# Patient Record
Sex: MALE | Race: OTHER
[De-identification: names, ages, dates, MRNs, and addresses within clinical notes are randomized per-mention and may not be internally consistent; named-entity substitution may affect disease eponyms.]

---

## 2021-11-15 ENCOUNTER — HOSPITAL ENCOUNTER (INPATIENT)
Dept: HOSPITAL 47 - EC | Age: 81
LOS: 4 days | Discharge: HOME | DRG: 193 | End: 2021-11-19
Attending: HOSPITALIST | Admitting: HOSPITALIST
Payer: OTHER GOVERNMENT

## 2021-11-15 DIAGNOSIS — I50.22: ICD-10-CM

## 2021-11-15 DIAGNOSIS — Z82.0: ICD-10-CM

## 2021-11-15 DIAGNOSIS — Z82.49: ICD-10-CM

## 2021-11-15 DIAGNOSIS — Z87.01: ICD-10-CM

## 2021-11-15 DIAGNOSIS — Z20.822: ICD-10-CM

## 2021-11-15 DIAGNOSIS — I11.0: ICD-10-CM

## 2021-11-15 DIAGNOSIS — I27.20: ICD-10-CM

## 2021-11-15 DIAGNOSIS — E11.9: ICD-10-CM

## 2021-11-15 DIAGNOSIS — I42.9: ICD-10-CM

## 2021-11-15 DIAGNOSIS — I08.1: ICD-10-CM

## 2021-11-15 DIAGNOSIS — Z79.899: ICD-10-CM

## 2021-11-15 DIAGNOSIS — E78.5: ICD-10-CM

## 2021-11-15 DIAGNOSIS — E86.0: ICD-10-CM

## 2021-11-15 DIAGNOSIS — J96.01: ICD-10-CM

## 2021-11-15 DIAGNOSIS — I44.7: ICD-10-CM

## 2021-11-15 DIAGNOSIS — F17.200: ICD-10-CM

## 2021-11-15 DIAGNOSIS — Z79.84: ICD-10-CM

## 2021-11-15 DIAGNOSIS — I24.9: ICD-10-CM

## 2021-11-15 DIAGNOSIS — Z79.890: ICD-10-CM

## 2021-11-15 DIAGNOSIS — J18.9: Primary | ICD-10-CM

## 2021-11-15 DIAGNOSIS — E89.0: ICD-10-CM

## 2021-11-15 DIAGNOSIS — N17.9: ICD-10-CM

## 2021-11-15 DIAGNOSIS — Z79.82: ICD-10-CM

## 2021-11-15 PROCEDURE — 96361 HYDRATE IV INFUSION ADD-ON: CPT

## 2021-11-15 PROCEDURE — 93306 TTE W/DOPPLER COMPLETE: CPT

## 2021-11-15 PROCEDURE — 85027 COMPLETE CBC AUTOMATED: CPT

## 2021-11-15 PROCEDURE — 94760 N-INVAS EAR/PLS OXIMETRY 1: CPT

## 2021-11-15 PROCEDURE — 83605 ASSAY OF LACTIC ACID: CPT

## 2021-11-15 PROCEDURE — 78452 HT MUSCLE IMAGE SPECT MULT: CPT

## 2021-11-15 PROCEDURE — 83735 ASSAY OF MAGNESIUM: CPT

## 2021-11-15 PROCEDURE — 80048 BASIC METABOLIC PNL TOTAL CA: CPT

## 2021-11-15 PROCEDURE — 93005 ELECTROCARDIOGRAM TRACING: CPT

## 2021-11-15 PROCEDURE — 87635 SARS-COV-2 COVID-19 AMP PRB: CPT

## 2021-11-15 PROCEDURE — 96366 THER/PROPH/DIAG IV INF ADDON: CPT

## 2021-11-15 PROCEDURE — 71046 X-RAY EXAM CHEST 2 VIEWS: CPT

## 2021-11-15 PROCEDURE — 87040 BLOOD CULTURE FOR BACTERIA: CPT

## 2021-11-15 PROCEDURE — 96367 TX/PROPH/DG ADDL SEQ IV INF: CPT

## 2021-11-15 PROCEDURE — 36415 COLL VENOUS BLD VENIPUNCTURE: CPT

## 2021-11-15 PROCEDURE — 83880 ASSAY OF NATRIURETIC PEPTIDE: CPT

## 2021-11-15 PROCEDURE — 80053 COMPREHEN METABOLIC PANEL: CPT

## 2021-11-15 PROCEDURE — 96365 THER/PROPH/DIAG IV INF INIT: CPT

## 2021-11-15 PROCEDURE — 85730 THROMBOPLASTIN TIME PARTIAL: CPT

## 2021-11-15 PROCEDURE — 85025 COMPLETE CBC W/AUTO DIFF WBC: CPT

## 2021-11-15 PROCEDURE — 85610 PROTHROMBIN TIME: CPT

## 2021-11-15 PROCEDURE — 93017 CV STRESS TEST TRACING ONLY: CPT

## 2021-11-15 PROCEDURE — 84484 ASSAY OF TROPONIN QUANT: CPT

## 2021-11-15 PROCEDURE — 85379 FIBRIN DEGRADATION QUANT: CPT

## 2021-11-15 PROCEDURE — 99285 EMERGENCY DEPT VISIT HI MDM: CPT

## 2021-11-16 LAB
ALBUMIN SERPL-MCNC: 3.5 G/DL (ref 3.5–5)
ALP SERPL-CCNC: 120 U/L (ref 38–126)
ALT SERPL-CCNC: 18 U/L (ref 4–49)
ANION GAP SERPL CALC-SCNC: 10 MMOL/L
APTT BLD: 23.8 SEC (ref 22–30)
AST SERPL-CCNC: 32 U/L (ref 17–59)
BASOPHILS # BLD AUTO: 0.1 K/UL (ref 0–0.2)
BASOPHILS NFR BLD AUTO: 1 %
BUN SERPL-SCNC: 37 MG/DL (ref 9–20)
CALCIUM SPEC-MCNC: 8.1 MG/DL (ref 8.4–10.2)
CHLORIDE SERPL-SCNC: 103 MMOL/L (ref 98–107)
CO2 SERPL-SCNC: 22 MMOL/L (ref 22–30)
EOSINOPHIL # BLD AUTO: 0.2 K/UL (ref 0–0.7)
EOSINOPHIL NFR BLD AUTO: 2 %
ERYTHROCYTE [DISTWIDTH] IN BLOOD BY AUTOMATED COUNT: 3.84 M/UL (ref 4.3–5.9)
ERYTHROCYTE [DISTWIDTH] IN BLOOD: 12.8 % (ref 11.5–15.5)
GLUCOSE BLD-MCNC: 112 MG/DL (ref 75–99)
GLUCOSE BLD-MCNC: 159 MG/DL (ref 75–99)
GLUCOSE BLD-MCNC: 194 MG/DL (ref 75–99)
GLUCOSE SERPL-MCNC: 182 MG/DL (ref 74–99)
HCT VFR BLD AUTO: 35.5 % (ref 39–53)
HGB BLD-MCNC: 11.6 GM/DL (ref 13–17.5)
INR PPP: 1.1 (ref ?–1.2)
LYMPHOCYTES # SPEC AUTO: 1.7 K/UL (ref 1–4.8)
LYMPHOCYTES NFR SPEC AUTO: 16 %
MAGNESIUM SPEC-SCNC: 2.3 MG/DL (ref 1.6–2.3)
MCH RBC QN AUTO: 30.1 PG (ref 25–35)
MCHC RBC AUTO-ENTMCNC: 32.6 G/DL (ref 31–37)
MCV RBC AUTO: 92.5 FL (ref 80–100)
MONOCYTES # BLD AUTO: 1 K/UL (ref 0–1)
MONOCYTES NFR BLD AUTO: 10 %
NEUTROPHILS # BLD AUTO: 7.6 K/UL (ref 1.3–7.7)
NEUTROPHILS NFR BLD AUTO: 71 %
PLATELET # BLD AUTO: 550 K/UL (ref 150–450)
POTASSIUM SERPL-SCNC: 4.9 MMOL/L (ref 3.5–5.1)
PROT SERPL-MCNC: 7.1 G/DL (ref 6.3–8.2)
PT BLD: 11.1 SEC (ref 9–12)
SODIUM SERPL-SCNC: 135 MMOL/L (ref 137–145)
WBC # BLD AUTO: 10.7 K/UL (ref 3.8–10.6)

## 2021-11-16 RX ADMIN — AZITHROMYCIN SCH MG: 500 TABLET, FILM COATED ORAL at 12:40

## 2021-11-16 RX ADMIN — INSULIN ASPART SCH: 100 INJECTION, SOLUTION INTRAVENOUS; SUBCUTANEOUS at 12:41

## 2021-11-16 RX ADMIN — METOPROLOL TARTRATE SCH MG: 25 TABLET, FILM COATED ORAL at 12:40

## 2021-11-16 RX ADMIN — HEPARIN SODIUM SCH UNIT: 5000 INJECTION INTRAVENOUS; SUBCUTANEOUS at 22:41

## 2021-11-16 RX ADMIN — INSULIN ASPART SCH UNIT: 100 INJECTION, SOLUTION INTRAVENOUS; SUBCUTANEOUS at 17:56

## 2021-11-16 RX ADMIN — METOPROLOL TARTRATE SCH MG: 25 TABLET, FILM COATED ORAL at 22:41

## 2021-11-16 RX ADMIN — ASPIRIN 81 MG CHEWABLE TABLET SCH MG: 81 TABLET CHEWABLE at 12:40

## 2021-11-16 RX ADMIN — HEPARIN SODIUM SCH UNIT: 5000 INJECTION INTRAVENOUS; SUBCUTANEOUS at 12:41

## 2021-11-16 NOTE — ED
General Adult HPI





- General


Chief complaint: Upper Respiratory Infection


Stated complaint: SOB


Time Seen by Provider: 11/16/21 00:22


Source: patient, RN notes reviewed


Mode of arrival: ambulatory


Limitations: no limitations





- History of Present Illness


Initial comments: 





Patient is an 81-year-old male with history of hypertension, hyperlipidemia, 

thyroid disorder, presenting to the emergency department with feeling short of 

breath over the past 3-4 days.  He states he had very mild cough but nothing out

of the ordinary.  He states he feeling really winded doing his normal 

activities.  He states he normally walks a lot and has not been able to do that.

 He denies any chest pain over the past couple days.  Denies any fevers or 

chills, no abdominal pain, nausea or vomiting.  He states he feels like he has 

still been eating his normal amount.  He has had no changes in the medications. 

He denies history of heart disease, no blood clots in the past, no recent 

travel.  He is not on blood thinners.  Patient has no further complaints.  Upon 

arrival to the ER, he was 89% on room air, rest of vitals normal.





- Related Data


                                Home Medications











 Medication  Instructions  Recorded  Confirmed


 


Aspirin EC [Ecotrin Low Dose] 81 mg PO Q48H 11/16/21 11/16/21


 


Carboxymethylcellulose Sodium 1 drop BOTH EYES QID PRN 11/16/21 11/16/21





[Refresh Tears]   


 


Levothyroxine Sodium [Synthroid] 125 mcg PO DAILY 11/16/21 11/16/21


 


Sildenafil Citrate [Viagra] 100 mg PO DAILY PRN 11/16/21 11/16/21


 


Simvastatin [Zocor] 40 mg PO HS 11/16/21 11/16/21


 


lisinopriL [Prinivil] 20 mg PO DAILY 11/16/21 11/16/21


 


metFORMIN  mg PO BID 11/16/21 11/16/21








                                  Previous Rx's











 Medication  Instructions  Recorded


 


Acetaminophen Tab [Tylenol] 650 mg PO Q6HR PRN  tab 11/19/21


 


Azithromycin [Zithromax] 500 mg PO DAILY 3 Days #3 tab 11/19/21


 


Cefdinir [Omnicef] 300 mg PO BID 5 Days #10 cap 11/19/21


 


Furosemide [Lasix] 40 mg PO DAILY 30 Days #30 tab 11/19/21


 


Metoprolol Tartrate [Lopressor] 25 mg PO BID 30 Days #60 tab 11/19/21











                                    Allergies











Allergy/AdvReac Type Severity Reaction Status Date / Time


 


No Known Allergies Allergy   Verified 11/16/21 00:02














Review of Systems


ROS Statement: 


Those systems with pertinent positive or pertinent negative responses have been 

documented in the HPI.





ROS Other: All systems not noted in ROS Statement are negative.





Past Medical History


Past Medical History: Hyperlipidemia, Hypertension, Thyroid Disorder


History of Any Multi-Drug Resistant Organisms: None Reported


Additional Past Surgical History / Comment(s): Thyroidectomy


Past Psychological History: No Psychological Hx Reported


Smoking Status: Current every day smoker


Past Alcohol Use History: None Reported


Past Drug Use History: None Reported





- Past Family History


  ** Mother


Family Medical History: Dementia


Additional Family Medical History / Comment(s): alzheimers





  ** Father


Family Medical History: Congestive Heart Failure (CHF)





General Exam





- General Exam Comments


Initial Comments: 





GENERAL: 


Patient is well-developed and well-nourished.  Patient is nontoxic and in no 

acute distress.





HEAD: 


Atraumatic, normocephalic.





EYES:


Pupils equal round and reactive to light, extraocular movements intact, sclera 

anicteric, conjunctiva are normal.  Eyelids were unremarkable.





ENT: 


Nares patent, oropharynx clear without exudates.  Moist mucous membranes.





NECK: 


Normal range of motion, supple without lymphadenopathy or JVD.





LUNGS:


Unlabored respirations.  Breath sounds clear to auscultation bilaterally and 

equal.  No wheezes rales or rhonchi.





HEART:


Regular rate and rhythm without murmurs, rubs or gallops.





ABDOMEN: 


Soft, nontender, normoactive bowel sounds.  No guarding, no rebound.  No masses 

appreciated.





MUSCULOSKELETAL: 


Normal extremities with adequate strength and normal range of motion, no pitting

 or edema.  No clubbing or cyanosis.





NEUROLOGICAL: 


Patient is alert and oriented x 3.  Motor and sensory are also intact.  Cranial 

nerves II through XII grossly intact.  Symmetrical smile.  Normal speech, normal

 gait.   





PSYCH:


Normal mood, normal affect.





SKIN:


 Warm, Dry, normal turgor, no rashes or lesions noted.


Limitations: no limitations





Course


                                   Vital Signs











  11/15/21 11/16/21 11/16/21





  23:58 01:27 01:45


 


Temperature 97.4 F L  


 


Pulse Rate 66  75


 


Pulse Rate [   





Right Pulse   





Oximetery]   


 


Respiratory 18 20 20





Rate   


 


Blood Pressure 134/58  135/89


 


Blood Pressure   





[Right Arm   





Supine]   


 


O2 Sat by Pulse 89 L  93 L





Oximetry   














  11/16/21 11/16/21 11/16/21





  02:00 03:00 04:00


 


Temperature   


 


Pulse Rate 77 72 80


 


Pulse Rate [   





Right Pulse   





Oximetery]   


 


Respiratory 18 20 18





Rate   


 


Blood Pressure 150/64 139/73 147/76


 


Blood Pressure   





[Right Arm   





Supine]   


 


O2 Sat by Pulse 93 L 95 94 L





Oximetry   














  11/16/21 11/16/21 11/16/21





  06:38 08:00 11:57


 


Temperature 99.0 F 97.5 F L 97.8 F


 


Pulse Rate 70  


 


Pulse Rate [  79 79





Right Pulse   





Oximetery]   


 


Respiratory 18 22 22





Rate   


 


Blood Pressure 146/77  


 


Blood Pressure  148/83 151/89





[Right Arm   





Supine]   


 


O2 Sat by Pulse 92 L 92 L 90 L





Oximetry   














  11/16/21 11/16/21 11/16/21





  14:00 16:00 19:48


 


Temperature  99.3 F 98.1 F


 


Pulse Rate   67


 


Pulse Rate [ 79 65 





Right Pulse   





Oximetery]   


 


Respiratory 22 20 16





Rate   


 


Blood Pressure   130/60


 


Blood Pressure  132/65 





[Right Arm   





Supine]   


 


O2 Sat by Pulse  95 93 L





Oximetry   














  11/16/21 11/17/21 11/17/21





  22:39 01:50 03:29


 


Temperature  98.5 F 


 


Pulse Rate 69  


 


Pulse Rate [  65 





Right Pulse   





Oximetery]   


 


Respiratory 18 18 





Rate   


 


Blood Pressure   


 


Blood Pressure  136/70 





[Right Arm   





Supine]   


 


O2 Sat by Pulse 94 L 96 94 L





Oximetry   














  11/17/21





  06:00


 


Temperature 


 


Pulse Rate 


 


Pulse Rate [ 





Right Pulse 





Oximetery] 


 


Respiratory 





Rate 


 


Blood Pressure 


 


Blood Pressure 





[Right Arm 





Supine] 


 


O2 Sat by Pulse 91 L





Oximetry 














- Reevaluation(s)


Reevaluation #1: 





11/16/21 03:33


Patient will be started on fluids at a rate of 100 mL per hour, secondary to 

heart failure for possible sepsis, dehydration.





EKG Findings





- EKG Comments:


EKG Findings:: Sinus rhythm with occasional premature ventricular complexes, 

left axis deviation, left bundle branch block, no signs of acute ST segment 

elevation.  There are no previous to compare this to.  Ventricular rate 76, NE 

interval 164, .





Medical Decision Making





- Medical Decision Making





Patient is an 81-year-old male with history of hypertension, hyperlipidemia, 

thyroid disease, presenting with shortness of breath over the past 3-4 days.  He

said a mild cough.  Patient arrived 89% on room air, rest of vitals within 

normal limits.  He has been on 3 L at 94%, resting comfortably.  He's had no 

chest pain over the past 3 days.  No acute ST segment changes on his EKG, left 

bundle adrián block.  Labs show a white count of 10.7, d-dimer is mildly bumped 

at 0.91.  Mild kidney failure with creatinine at 1.34, BUN is 37.  Lactic acid 

is 2.1, troponin is 0.069 with a BNP almost 6000.  Rapid Covid is negative.  Ch

est x-ray shows diffuse right sided pneumonia.  No heart failure. Patient has no

chest pain here today.  Patient will be admitted for pneumonia, and consult to 

cardiology, started on antibiotics.  Case discussed with Dr. Styles. 





- Lab Data


Result diagrams: 


                                 11/18/21 11:16





                                 11/18/21 11:16


                                   Lab Results











  11/16/21 11/16/21 11/16/21 Range/Units





  01:41 01:41 01:41 


 


WBC  10.7 H    (3.8-10.6)  k/uL


 


RBC  3.84 L    (4.30-5.90)  m/uL


 


Hgb  11.6 L    (13.0-17.5)  gm/dL


 


Hct  35.5 L    (39.0-53.0)  %


 


MCV  92.5    (80.0-100.0)  fL


 


MCH  30.1    (25.0-35.0)  pg


 


MCHC  32.6    (31.0-37.0)  g/dL


 


RDW  12.8    (11.5-15.5)  %


 


Plt Count  550 H    (150-450)  k/uL


 


MPV  7.7    


 


Neutrophils %  71    %


 


Lymphocytes %  16    %


 


Monocytes %  10    %


 


Eosinophils %  2    %


 


Basophils %  1    %


 


Neutrophils #  7.6    (1.3-7.7)  k/uL


 


Lymphocytes #  1.7    (1.0-4.8)  k/uL


 


Monocytes #  1.0    (0-1.0)  k/uL


 


Eosinophils #  0.2    (0-0.7)  k/uL


 


Basophils #  0.1    (0-0.2)  k/uL


 


PT   11.1   (9.0-12.0)  sec


 


INR   1.1   (<1.2)  


 


APTT   23.8   (22.0-30.0)  sec


 


D-Dimer   0.91 H   (<0.60)  mg/L FEU


 


Sodium    135 L  (137-145)  mmol/L


 


Potassium    4.9  (3.5-5.1)  mmol/L


 


Chloride    103  ()  mmol/L


 


Carbon Dioxide    22  (22-30)  mmol/L


 


Anion Gap    10  mmol/L


 


BUN    37 H  (9-20)  mg/dL


 


Creatinine    1.34 H  (0.66-1.25)  mg/dL


 


Est GFR (CKD-EPI)AfAm    57  (>60 ml/min/1.73 sqM)  


 


Est GFR (CKD-EPI)NonAf    50  (>60 ml/min/1.73 sqM)  


 


Glucose    182 H  (74-99)  mg/dL


 


Lactic Ac Sepsis Rflx     


 


Plasma Lactic Acid Girma     (0.7-2.0)  mmol/L


 


Calcium    8.1 L  (8.4-10.2)  mg/dL


 


Magnesium    2.3  (1.6-2.3)  mg/dL


 


Total Bilirubin    0.4  (0.2-1.3)  mg/dL


 


AST    32  (17-59)  U/L


 


ALT    18  (4-49)  U/L


 


Alkaline Phosphatase    120  ()  U/L


 


Troponin I     (0.000-0.034)  ng/mL


 


NT-Pro-B Natriuret Pep     pg/mL


 


Total Protein    7.1  (6.3-8.2)  g/dL


 


Albumin    3.5  (3.5-5.0)  g/dL


 


Coronavirus (PCR)     (Not Detectd)  














  11/16/21 11/16/21 11/16/21 Range/Units





  01:41 01:41 01:41 


 


WBC     (3.8-10.6)  k/uL


 


RBC     (4.30-5.90)  m/uL


 


Hgb     (13.0-17.5)  gm/dL


 


Hct     (39.0-53.0)  %


 


MCV     (80.0-100.0)  fL


 


MCH     (25.0-35.0)  pg


 


MCHC     (31.0-37.0)  g/dL


 


RDW     (11.5-15.5)  %


 


Plt Count     (150-450)  k/uL


 


MPV     


 


Neutrophils %     %


 


Lymphocytes %     %


 


Monocytes %     %


 


Eosinophils %     %


 


Basophils %     %


 


Neutrophils #     (1.3-7.7)  k/uL


 


Lymphocytes #     (1.0-4.8)  k/uL


 


Monocytes #     (0-1.0)  k/uL


 


Eosinophils #     (0-0.7)  k/uL


 


Basophils #     (0-0.2)  k/uL


 


PT     (9.0-12.0)  sec


 


INR     (<1.2)  


 


APTT     (22.0-30.0)  sec


 


D-Dimer     (<0.60)  mg/L FEU


 


Sodium     (137-145)  mmol/L


 


Potassium     (3.5-5.1)  mmol/L


 


Chloride     ()  mmol/L


 


Carbon Dioxide     (22-30)  mmol/L


 


Anion Gap     mmol/L


 


BUN     (9-20)  mg/dL


 


Creatinine     (0.66-1.25)  mg/dL


 


Est GFR (CKD-EPI)AfAm     (>60 ml/min/1.73 sqM)  


 


Est GFR (CKD-EPI)NonAf     (>60 ml/min/1.73 sqM)  


 


Glucose     (74-99)  mg/dL


 


Lactic Ac Sepsis Rflx     


 


Plasma Lactic Acid Girma  2.1 H*    (0.7-2.0)  mmol/L


 


Calcium     (8.4-10.2)  mg/dL


 


Magnesium     (1.6-2.3)  mg/dL


 


Total Bilirubin     (0.2-1.3)  mg/dL


 


AST     (17-59)  U/L


 


ALT     (4-49)  U/L


 


Alkaline Phosphatase     ()  U/L


 


Troponin I   0.069 H*   (0.000-0.034)  ng/mL


 


NT-Pro-B Natriuret Pep    5980  pg/mL


 


Total Protein     (6.3-8.2)  g/dL


 


Albumin     (3.5-5.0)  g/dL


 


Coronavirus (PCR)     (Not Detectd)  














  11/16/21 11/16/21 Range/Units





  01:41 02:50 


 


WBC    (3.8-10.6)  k/uL


 


RBC    (4.30-5.90)  m/uL


 


Hgb    (13.0-17.5)  gm/dL


 


Hct    (39.0-53.0)  %


 


MCV    (80.0-100.0)  fL


 


MCH    (25.0-35.0)  pg


 


MCHC    (31.0-37.0)  g/dL


 


RDW    (11.5-15.5)  %


 


Plt Count    (150-450)  k/uL


 


MPV    


 


Neutrophils %    %


 


Lymphocytes %    %


 


Monocytes %    %


 


Eosinophils %    %


 


Basophils %    %


 


Neutrophils #    (1.3-7.7)  k/uL


 


Lymphocytes #    (1.0-4.8)  k/uL


 


Monocytes #    (0-1.0)  k/uL


 


Eosinophils #    (0-0.7)  k/uL


 


Basophils #    (0-0.2)  k/uL


 


PT    (9.0-12.0)  sec


 


INR    (<1.2)  


 


APTT    (22.0-30.0)  sec


 


D-Dimer    (<0.60)  mg/L FEU


 


Sodium    (137-145)  mmol/L


 


Potassium    (3.5-5.1)  mmol/L


 


Chloride    ()  mmol/L


 


Carbon Dioxide    (22-30)  mmol/L


 


Anion Gap    mmol/L


 


BUN    (9-20)  mg/dL


 


Creatinine    (0.66-1.25)  mg/dL


 


Est GFR (CKD-EPI)AfAm    (>60 ml/min/1.73 sqM)  


 


Est GFR (CKD-EPI)NonAf    (>60 ml/min/1.73 sqM)  


 


Glucose    (74-99)  mg/dL


 


Lactic Ac Sepsis Rflx   Y  


 


Plasma Lactic Acid Girma    (0.7-2.0)  mmol/L


 


Calcium    (8.4-10.2)  mg/dL


 


Magnesium    (1.6-2.3)  mg/dL


 


Total Bilirubin    (0.2-1.3)  mg/dL


 


AST    (17-59)  U/L


 


ALT    (4-49)  U/L


 


Alkaline Phosphatase    ()  U/L


 


Troponin I    (0.000-0.034)  ng/mL


 


NT-Pro-B Natriuret Pep    pg/mL


 


Total Protein    (6.3-8.2)  g/dL


 


Albumin    (3.5-5.0)  g/dL


 


Coronavirus (PCR)  Not Detected   (Not Detectd)  














Disposition


Clinical Impression: 


 Pneumonia, Dyspnea, Elevated troponin, Dehydration





Disposition: ADMITTED AS IP TO THIS [facility]


Condition: Stable


Decision Date: 11/16/21


Decision Time: 03:28

## 2021-11-16 NOTE — P.CRDCN
History of Present Illness


History of present illness: 


HISTORY OF PRESENTING ILLNESS


This is a pleasant 81-year-old male past medical history significant for 

hypertension, dyslipidemia, hypothyroidism, type 2 diabetes.  He does not follow

with a cardiologist. We have been asked to see in consultation for elevated 

troponin. Patient is seen and examined in the emergency department. Patient 

presents to the emergency department with complaints of shortness of breath and 

non-productive cough. His symptoms started 11/10 and progressively were getting 

worse and decided presents emergency department for further evaluation.. He 

denies any chest pain, nausea, diaphoresis, palpitations, lightheadedness, 

dizziness, symptoms of presyncope or syncope.  Denies symptoms of orthopnea or 

PND. He denies fevers or chills.  He denies any history of MI, stroke, coronary 

artery disease.  He denies a family history of heart disease. He denies smoking,

states he quit smoking many years ago. 





DIAGNOSTICS


EKG reveals sinus rhythm with PVCs, left bundle branch block, heart rate 76, 

Left axis deviation. No prior EKG to compare


Echocardiogram revealed an EF 4045 percent, RV is mildly enlarged, mild to 

moderate mitral regurgitation, moderate severe tricuspid regurgitation, moderate

to severe pulmonary hypertension with RVSP of 57 mmHg


Telemetry tracings indicate sinus mechanism heart rates 70s80s.


Chest xray revealed diffuse right-sided pneumonia.


Laboratory reviewed, WBC 10.7, hemoglobin 11.6, platelets 550, d-dimer 0.91, 

sodium 135, potassium 4., BUN 37, serum creatinine 1.3, lactate 2.1, repeat 1.4,

troponin 0.06, 0.7, 0.05, proBNP 5008, COVID-19 PCR negative.


Current cardiac medications include simvastatin 40 mg nightly, lisinopril 20 mg 

daily, aspirin 81 mg daily, amlodipine 10 mg daily





REVIEW OF SYSTEMS


At the time of my exam:


CONSTITUTIONAL: Denies fever or chills.


CARDIOVASCULAR: Positive shortness of breath, positive cough Denies chest pain, 

orthopnea, PND or palpitations.


RESPIRATORY: + cough


GASTROINTESTINAL: Denies abdominal pain, diarrhea, constipation, nausea or 

vomiting.


MUSCULOSKELETAL: Denies myalgias.


NEUROLOGIC: Denies numbness, tingling, headacbe or weakness.


ENDOCRINE: Denies fatigue, weight change,  polydipsia or polyurina.


GENITOURINARY: Denies burning, hematuria or urgency with micturation.


HEMATOLOGIC: Denies history of anemia or bleeding. 





PHYSICAL EXAMINATION


Blood pressure 151/89, heart 69, afebrile saturations 90% on 4 L nasal cannula


CONSTITUTIONAL: No apparent distress. 


HEENT: Head is normocephalic. Pupils are equal, round. Sclerae anicteric. Mucous

membranes of the mouth are moist.  No JVD. No carotid bruit.


CHEST EXAMINATION: Lungs are clear to auscultation. No chest wall tenderness is 

noted on palpation or with deep breathing. 


HEART EXAMINATION: Regular rate and rhythm. S1, S2 heard.Systolic ejection 

murmur at apex. 


ABDOMEN: Soft, nontender. Positive bowel sounds.


EXTREMITIES: 2+ peripheral pulses, no lower extremity edema and no calf 

tenderness.


NEUROLOGIC EXAMINATION: Patient is awake, alert and oriented x3. 





ASSESSMENT


Acute hypoxic respiratory failure probably secondary to pneumonia


Elevated troponin, mild, likely due to supply an demand mismatch, no consistent 

with acute coronary syndrome


Acute Kidney Injury


Severe tricuspid regurgitation 


History of hypertension


Dyslipidemia


Type 2 diabetes


Echocardiogram revealed cardiomyopathy with EF 40-45%, unclear if ischemic vs 

non-ischemic at this time, patient appears euvolemic on exam 


History of hypothyroidism 





PLAN


2D Echocardiogram obtained and reviewed.


Start aspirin 81 mg daily, continue statin.


Continue lisinopril 20 mg daily


Start metoprolol tartrate 12.5 mg twice a day


Further recommendations based on clinical course 





Nurse Practitioner note has been reviewed, I agree with a documented findings 

and plan of care.  Patient was seen and examined.














Past Medical History


Past Medical History: Hyperlipidemia, Hypertension, Thyroid Disorder


History of Any Multi-Drug Resistant Organisms: None Reported


Additional Past Surgical History / Comment(s): Thyroidectomy


Past Psychological History: No Psychological Hx Reported


Smoking Status: Current every day smoker


Past Alcohol Use History: None Reported


Past Drug Use History: None Reported





Medications and Allergies


                                Home Medications











 Medication  Instructions  Recorded  Confirmed  Type


 


Aspirin EC [Ecotrin Low Dose] 81 mg PO Q48H 11/16/21 11/16/21 History


 


Carboxymethylcellulose Sodium 1 drop BOTH EYES QID PRN 11/16/21 11/16/21 History





[Refresh Tears]    


 


Levothyroxine Sodium [Synthroid] 125 mcg PO DAILY 11/16/21 11/16/21 History


 


Sildenafil Citrate [Viagra] 100 mg PO DAILY PRN 11/16/21 11/16/21 History


 


Simvastatin [Zocor] 40 mg PO HS 11/16/21 11/16/21 History


 


amLODIPine [Norvasc] 10 mg PO DAILY 11/16/21 11/16/21 History


 


lisinopriL [Prinivil] 20 mg PO DAILY 11/16/21 11/16/21 History


 


metFORMIN  mg PO BID 11/16/21 11/16/21 History








                                    Allergies











Allergy/AdvReac Type Severity Reaction Status Date / Time


 


No Known Allergies Allergy   Verified 11/16/21 00:02














Physical Exam


Vitals: 


                                   Vital Signs











  Temp Pulse Resp BP Pulse Ox


 


 11/16/21 06:38  99.0 F  70  18  146/77  92 L


 


 11/16/21 04:00   80  18  147/76  94 L


 


 11/16/21 03:00   72  20  139/73  95


 


 11/16/21 02:00   77  18  150/64  93 L


 


 11/16/21 01:45   75  20  135/89  93 L


 


 11/16/21 01:27    20  


 


 11/15/21 23:58  97.4 F L  66  18  134/58  89 L








                                Intake and Output











 11/15/21 11/16/21 11/16/21





 22:59 06:59 14:59


 


Other:   


 


  Weight  97.522 kg 














Results





                                 11/16/21 01:41





                                 11/16/21 01:41


                                 Cardiac Enzymes











  11/16/21 11/16/21 11/16/21 Range/Units





  01:41 01:41 05:34 


 


AST  32    (17-59)  U/L


 


Troponin I   0.069 H*  0.075 H*  (0.000-0.034)  ng/mL








                                   Coagulation











  11/16/21 Range/Units





  01:41 


 


PT  11.1  (9.0-12.0)  sec


 


APTT  23.8  (22.0-30.0)  sec








                                       CBC











  11/16/21 Range/Units





  01:41 


 


WBC  10.7 H  (3.8-10.6)  k/uL


 


RBC  3.84 L  (4.30-5.90)  m/uL


 


Hgb  11.6 L  (13.0-17.5)  gm/dL


 


Hct  35.5 L  (39.0-53.0)  %


 


Plt Count  550 H  (150-450)  k/uL








                          Comprehensive Metabolic Panel











  11/16/21 Range/Units





  01:41 


 


Sodium  135 L  (137-145)  mmol/L


 


Potassium  4.9  (3.5-5.1)  mmol/L


 


Chloride  103  ()  mmol/L


 


Carbon Dioxide  22  (22-30)  mmol/L


 


BUN  37 H  (9-20)  mg/dL


 


Creatinine  1.34 H  (0.66-1.25)  mg/dL


 


Glucose  182 H  (74-99)  mg/dL


 


Calcium  8.1 L  (8.4-10.2)  mg/dL


 


AST  32  (17-59)  U/L


 


ALT  18  (4-49)  U/L


 


Alkaline Phosphatase  120  ()  U/L


 


Total Protein  7.1  (6.3-8.2)  g/dL


 


Albumin  3.5  (3.5-5.0)  g/dL








                               Current Medications











Generic Name Dose Route Start Last Admin





  Trade Name Freq  PRN Reason Stop Dose Admin


 


Acetaminophen  650 mg  11/16/21 03:31 





  Acetaminophen Tab 325 Mg Tab  PO  





  Q6HR PRN  





  Mild Pain or Fever > 100.5  


 


Sodium Chloride  1,000 mls @ 100 mls/hr  11/16/21 03:30  11/16/21 03:55





  Saline 0.9%  IV   100 mls/hr





  .Q10H OPAL   Administration


 


Ibuprofen  400 mg  11/16/21 03:31 





  Ibuprofen 400 Mg Tab  PO  





  Q6HR PRN  





  Mild Pain or Fever > 100.5  


 


Miscellaneous Information  1 each  11/16/21 03:29 





  Pneumonia Protocol Utilized 1 Each Misc  PO  





  ONCE PRN  





  Per Protocol  


 


Naloxone HCl  0.2 mg  11/16/21 03:31 





  Naloxone 0.4 Mg/Ml 1 Ml Vial  IV  





  Q2M PRN  





  Opioid Reversal  


 


Ondansetron HCl  4 mg  11/16/21 03:31 





  Ondansetron 4 Mg/2 Ml Vial  IVP  





  Q8HR PRN  





  Nausea And Vomiting  








                                Intake and Output











 11/15/21 11/16/21 11/16/21





 22:59 06:59 14:59


 


Other:   


 


  Weight  97.522 kg 








                                        





                                 11/16/21 01:41 





                                 11/16/21 01:41

## 2021-11-16 NOTE — XR
EXAMINATION TYPE: XR chest 2V

 

DATE OF EXAM: 11/16/2021

 

COMPARISON: NONE

 

HISTORY: Difficulty breathing

 

TECHNIQUE: 2 views

 

FINDINGS: There is diffuse airspace infiltrate in the right lung. Left lung is relatively clear. Hear
t size is normal. There is no heart failure. Bony thorax is intact.

 

IMPRESSION: Diffuse right-sided pneumonia. Normal heart.

## 2021-11-16 NOTE — ECHOF
Referral Reason:LV function



MEASUREMENTS

--------

HEIGHT: 177.8 cm

WEIGHT: 97.5 kg

BP: 148/83

RVIDd:   4.0 cm     (< 3.3)

IVSd:   1.4 cm     (0.6 - 1.1)

LVIDd:   4.8 cm     (3.9 - 5.3)

LVPWd:   1.3 cm     (0.6 - 1.1)

IVSs:   2.1 cm

LVIDs:   3.5 cm

LVPWs:   1.9 cm

LAESV Index (A-L):   27.15 ml/m

Ao Diam:   3.0 cm     (2.0 - 3.7)

AV Cusp:   2.0 cm     (1.5 - 2.6)

MV EXCURSION:   14.991 mm     (> 18.000)

MV EF SLOPE:   60 mm/s     (70 - 150)

EPSS:   1.1 cm

MV E Alex:   0.88 m/s

MV DecT:   329 ms

MV A Alex:   1.28 m/s

MV E/A Ratio:   0.69 

RAP:   5.00 mmHg

RVSP:   56.92 mmHg







FINDINGS

--------

Sinus rhythm with extra systolic beats.

This was a technically difficult study with suboptimal apical views.

The left ventricular size is normal.   There is moderate concentric left ventricular hypertrophy.   O
verall left ventricular systolic function is mild-moderately impaired with, an EF between 40 - 45 %.

The right ventricle is moderately enlarged.

Normal LA  size by volume 22+/-6 ml/m2.

The right atrium was not well visualized.

5.0mg of Lumason was utilized for enhancement of images

Interatrial and interventricular septum intact.

There is no evidence of aortic regurgitation.   There is no evidence of aortic stenosis.

Mild-to-moderate mitral regurgitation is present.

Moderate to severe tricuspid regurgitation present.   There is moderate to severe pulmonary hypertens
ion.   The right ventricular systolic pressure, as measured by Doppler, is 56.92mmHg.

There is no pulmonic regurgitation present.

The aortic root size is normal.

IVC Not well visulized.

There is no pericardial effusion.



CONCLUSIONS

--------

1. The left ventricular size is normal.

2. There is moderate concentric left ventricular hypertrophy.

3. Overall left ventricular systolic function is mild-moderately impaired with, an EF between 40 - 45
 %.

4. The right ventricle is moderately enlarged.

5. Mild-to-moderate mitral regurgitation is present.

6. Moderate to severe tricuspid regurgitation present.

7. There is moderate to severe pulmonary hypertension.

8. The right ventricular systolic pressure, as measured by Doppler, is 56.92mmHg.





SONOGRAPHER: Shayna Pearson RDCS

## 2021-11-16 NOTE — P.HPIM
History of Present Illness


Patient is is a pleasant 81-year-old male came in with complaints of shortness 

of breath cough with sputum production found to have right upper lobe pneumonia.

 Patient does have leukocytosis and low-grade fever as well.  Patient had a 

remote history of smoking quit in 1967.  Patient does have a elevated BNP denied

any significant the proximal nocturnal dyspnea questionable orthopnea.  Patient 

did get an echo cardiac exam which showed mildly decreased EF of around 40-45% 

and patient doesn't have a known history of can start failure patient does have 

severe tricuspid regurgitation and the moderate to severe pulmonary hypertens

ion.  Patient has mildly elevated troponins without any significant chest pain. 

Troponins are flat.  Cardiology will evaluate the patient.











REVIEW OF SYSTEMS: 


CONSTITUTIONAL:  no malaise, no fatigue. 


HEENT: No recent visual problems or hearing problems. Denied any sore throat. 


CARDIOVASCULAR: No chest pain, orthopnea, PND, no palpitations, no syncope. 


PULMONARY: As mentioned in HPI 


GASTROINTESTINAL: No diarrhea, no nausea, no vomiting, no abdominal pain. 


NEUROLOGICAL: No headaches, no weakness, no numbness. 


HEMATOLOGICAL: Denies any bleeding or petechiae. 


GENITOURINARY: Denies any burning micturition, frequency, or urgency. 


MUSCULOSKELETAL/RHEUMATOLOGICAL: Denies any joint pain, swelling, or any muscle 

pain. 


ENDOCRINE: Denies any polyuria or polydipsia. 





The rest of the 14-point review of systems is negative.











PHYSICAL EXAMINATION: 





GENERAL: The patient is alert and oriented x3, not in any acute distress.  Obese

HEENT: Pupils are round and equally reacting to light. EOMI. No scleral icterus.

No conjunctival pallor. Normocephalic, atraumatic. No pharyngeal erythema. No 

thyromegaly. 


CARDIOVASCULAR: S1 and S2 present. No murmurs, rubs, or gallops. 


PULMONARY: Chest is clear to auscultation, no wheezing or crackles. 


ABDOMEN: Soft, nontender, nondistended, normoactive bowel sounds. No palpable 

organomegaly. 


MUSCULOSKELETAL: No joint swelling or deformity.


EXTREMITIES: No cyanosis, clubbing, or pedal edema. 


NEUROLOGICAL: Gross neurological examination did not reveal any focal deficits. 


SKIN: No rashes. 





Assessment and plan


-Acute hypoxic respiratory failure probably secondary to pneumonia patient may 

have a competent of sleep apnea and does have pulmonary hypertension of as well 

because of which are patient is requiring oxygen around 4 L at this time.  He 

continued on Rocephin and azithromycin.  Sputum cultures will be obtained.


-Mild troponin elevation secondary to hypoxemia and pneumonia cardiology will 

evaluate the patient.  Patient has left bundle branch block on EKG previous EKGs

are not available to compare


-moderate to severe pulmonary hypertension probably secondary to mitral 

regurgitation and/or sleep apnea.


--Elevated creatinine unsure whether patient has acute renal failure chronic 

kidney disease possibility of acute failure patient received IV fluids with 

because of the concerns of her decreased ejection fraction and elevated BNP I'm 

stopping his IV fluids at this time although patient clinically and presently 

not in heart failure exacerbation and will be continued on lisinopril if his 

creatinine continued get was then we'll let this can you.  We'll at that time.  

We'll recheck patient's creatinine tomorrow


-Congestive heart failure chronic systolic dysfunction without any acute 

exacerbation 


-Severe tricuspid regurgitation


-Possibly of pulmonary hypertension


-Hypothyroidism


Type 2 diabetes mellitus: Hold off on metformin patient will be started on 

sliding scale insulin


DVT prophylaxis: Subcutaneous heparin








Past Medical History


Past Medical History: Hyperlipidemia, Hypertension, Thyroid Disorder


History of Any Multi-Drug Resistant Organisms: None Reported


Additional Past Surgical History / Comment(s): Thyroidectomy


Past Psychological History: No Psychological Hx Reported


Smoking Status: Current every day smoker


Past Alcohol Use History: None Reported


Past Drug Use History: None Reported





Medications and Allergies


                                Home Medications











 Medication  Instructions  Recorded  Confirmed  Type


 


Aspirin EC [Ecotrin Low Dose] 81 mg PO Q48H 11/16/21 11/16/21 History


 


Carboxymethylcellulose Sodium 1 drop BOTH EYES QID PRN 11/16/21 11/16/21 History





[Refresh Tears]    


 


Levothyroxine Sodium [Synthroid] 125 mcg PO DAILY 11/16/21 11/16/21 History


 


Sildenafil Citrate [Viagra] 100 mg PO DAILY PRN 11/16/21 11/16/21 History


 


Simvastatin [Zocor] 40 mg PO HS 11/16/21 11/16/21 History


 


amLODIPine [Norvasc] 10 mg PO DAILY 11/16/21 11/16/21 History


 


lisinopriL [Prinivil] 20 mg PO DAILY 11/16/21 11/16/21 History


 


metFORMIN  mg PO BID 11/16/21 11/16/21 History








                                    Allergies











Allergy/AdvReac Type Severity Reaction Status Date / Time


 


No Known Allergies Allergy   Verified 11/16/21 00:02














Physical Exam


Vitals: 


                                   Vital Signs











  Temp Pulse Pulse Resp BP BP Pulse Ox


 


 11/16/21 08:00  97.5 F L   78  19   148/83  92 L


 


 11/16/21 06:38  99.0 F  70   18  146/77   92 L


 


 11/16/21 04:00   80   18  147/76   94 L


 


 11/16/21 03:00   72   20  139/73   95


 


 11/16/21 02:00   77   18  150/64   93 L


 


 11/16/21 01:45   75   20  135/89   93 L


 


 11/16/21 01:27     20   


 


 11/15/21 23:58  97.4 F L  66   18  134/58   89 L








                                Intake and Output











 11/15/21 11/16/21 11/16/21





 22:59 06:59 14:59


 


Other:   


 


  Weight  97.522 kg 














Results


CBC & Chem 7: 


                                 11/16/21 01:41





                                 11/16/21 01:41


Labs: 


                  Abnormal Lab Results - Last 24 Hours (Table)











  11/16/21 11/16/21 11/16/21 Range/Units





  01:41 01:41 01:41 


 


WBC  10.7 H    (3.8-10.6)  k/uL


 


RBC  3.84 L    (4.30-5.90)  m/uL


 


Hgb  11.6 L    (13.0-17.5)  gm/dL


 


Hct  35.5 L    (39.0-53.0)  %


 


Plt Count  550 H    (150-450)  k/uL


 


D-Dimer   0.91 H   (<0.60)  mg/L FEU


 


Sodium    135 L  (137-145)  mmol/L


 


BUN    37 H  (9-20)  mg/dL


 


Creatinine    1.34 H  (0.66-1.25)  mg/dL


 


Glucose    182 H  (74-99)  mg/dL


 


Plasma Lactic Acid Girma     (0.7-2.0)  mmol/L


 


Calcium    8.1 L  (8.4-10.2)  mg/dL


 


Troponin I     (0.000-0.034)  ng/mL














  11/16/21 11/16/21 11/16/21 Range/Units





  01:41 01:41 05:34 


 


WBC     (3.8-10.6)  k/uL


 


RBC     (4.30-5.90)  m/uL


 


Hgb     (13.0-17.5)  gm/dL


 


Hct     (39.0-53.0)  %


 


Plt Count     (150-450)  k/uL


 


D-Dimer     (<0.60)  mg/L FEU


 


Sodium     (137-145)  mmol/L


 


BUN     (9-20)  mg/dL


 


Creatinine     (0.66-1.25)  mg/dL


 


Glucose     (74-99)  mg/dL


 


Plasma Lactic Acid Girma  2.1 H*    (0.7-2.0)  mmol/L


 


Calcium     (8.4-10.2)  mg/dL


 


Troponin I   0.069 H*  0.075 H*  (0.000-0.034)  ng/mL














  11/16/21 Range/Units





  08:25 


 


WBC   (3.8-10.6)  k/uL


 


RBC   (4.30-5.90)  m/uL


 


Hgb   (13.0-17.5)  gm/dL


 


Hct   (39.0-53.0)  %


 


Plt Count   (150-450)  k/uL


 


D-Dimer   (<0.60)  mg/L FEU


 


Sodium   (137-145)  mmol/L


 


BUN   (9-20)  mg/dL


 


Creatinine   (0.66-1.25)  mg/dL


 


Glucose   (74-99)  mg/dL


 


Plasma Lactic Acid Girma   (0.7-2.0)  mmol/L


 


Calcium   (8.4-10.2)  mg/dL


 


Troponin I  0.056 H*  (0.000-0.034)  ng/mL

## 2021-11-17 LAB
ANION GAP SERPL CALC-SCNC: 14.4 MMOL/L (ref 4–12)
BUN SERPL-SCNC: 33 MG/DL (ref 9–27)
BUN/CREAT SERPL: 23.74 RATIO (ref 12–20)
CALCIUM SPEC-MCNC: 7.5 MG/DL (ref 8.7–10.3)
CHLORIDE SERPL-SCNC: 104 MMOL/L (ref 96–109)
CO2 SERPL-SCNC: 20.1 MMOL/L (ref 21.6–31.8)
ERYTHROCYTE [DISTWIDTH] IN BLOOD BY AUTOMATED COUNT: 3.44 X 10*6/UL (ref 4.4–5.6)
ERYTHROCYTE [DISTWIDTH] IN BLOOD: 13.2 % (ref 11.5–14.5)
GLUCOSE BLD-MCNC: 127 MG/DL (ref 75–99)
GLUCOSE BLD-MCNC: 174 MG/DL (ref 75–99)
GLUCOSE BLD-MCNC: 210 MG/DL (ref 75–99)
GLUCOSE SERPL-MCNC: 128 MG/DL (ref 70–110)
HCT VFR BLD AUTO: 32.2 % (ref 39.6–50)
HGB BLD-MCNC: 10.4 G/DL (ref 13–17)
MCH RBC QN AUTO: 30.2 PG (ref 27–32)
MCHC RBC AUTO-ENTMCNC: 32.3 G/DL (ref 32–37)
MCV RBC AUTO: 93.6 FL (ref 80–97)
PLATELET # BLD AUTO: 530 X 10*3/UL (ref 140–440)
POTASSIUM SERPL-SCNC: 4.4 MMOL/L (ref 3.5–5.5)
SODIUM SERPL-SCNC: 138 MMOL/L (ref 135–145)
WBC # BLD AUTO: 10.62 X 10*3/UL (ref 4.5–10)

## 2021-11-17 RX ADMIN — ATORVASTATIN CALCIUM SCH MG: 20 TABLET, FILM COATED ORAL at 20:14

## 2021-11-17 RX ADMIN — INSULIN ASPART SCH: 100 INJECTION, SOLUTION INTRAVENOUS; SUBCUTANEOUS at 01:40

## 2021-11-17 RX ADMIN — ASPIRIN 81 MG CHEWABLE TABLET SCH MG: 81 TABLET CHEWABLE at 09:05

## 2021-11-17 RX ADMIN — METOPROLOL TARTRATE SCH MG: 25 TABLET, FILM COATED ORAL at 20:14

## 2021-11-17 RX ADMIN — LEVOTHYROXINE SODIUM SCH MCG: 0.12 TABLET ORAL at 09:05

## 2021-11-17 RX ADMIN — METOPROLOL TARTRATE SCH MG: 25 TABLET, FILM COATED ORAL at 10:18

## 2021-11-17 RX ADMIN — HEPARIN SODIUM SCH UNIT: 5000 INJECTION INTRAVENOUS; SUBCUTANEOUS at 09:04

## 2021-11-17 RX ADMIN — ATORVASTATIN CALCIUM SCH: 20 TABLET, FILM COATED ORAL at 03:42

## 2021-11-17 RX ADMIN — INSULIN ASPART SCH UNIT: 100 INJECTION, SOLUTION INTRAVENOUS; SUBCUTANEOUS at 20:14

## 2021-11-17 RX ADMIN — HEPARIN SODIUM SCH UNIT: 5000 INJECTION INTRAVENOUS; SUBCUTANEOUS at 20:14

## 2021-11-17 RX ADMIN — FUROSEMIDE SCH MG: 10 INJECTION, SOLUTION INTRAMUSCULAR; INTRAVENOUS at 10:18

## 2021-11-17 RX ADMIN — AZITHROMYCIN SCH MG: 500 TABLET, FILM COATED ORAL at 09:04

## 2021-11-17 RX ADMIN — INSULIN ASPART SCH: 100 INJECTION, SOLUTION INTRAVENOUS; SUBCUTANEOUS at 18:21

## 2021-11-17 RX ADMIN — INSULIN ASPART SCH UNIT: 100 INJECTION, SOLUTION INTRAVENOUS; SUBCUTANEOUS at 12:12

## 2021-11-17 RX ADMIN — INSULIN ASPART SCH: 100 INJECTION, SOLUTION INTRAVENOUS; SUBCUTANEOUS at 09:05

## 2021-11-17 NOTE — XR
EXAMINATION TYPE: XR chest 2V

 

DATE OF EXAM: 11/17/2021

 

COMPARISON: Chest x-ray 11/16/2021

 

HISTORY: Pneumonia

 

TECHNIQUE:  Frontal and lateral views of the chest are obtained.

 

FINDINGS:  Airspace disease seen within the right lung shows a similar appearance. No significant int
erval change. Region is rotated. Cardiac mediastinal silhouette is stable. No evident pneumothorax or
 pleural effusion.

 

IMPRESSION:  Findings consistent with patient's history of pneumonia.

## 2021-11-17 NOTE — P.PN
Subjective


Progress Note Date: 11/17/21


Patient is is a pleasant 81-year-old male came in with complaints of shortness 

of breath cough with sputum production found to have right upper lobe pneumonia.

 Patient does have leukocytosis and low-grade fever as well.  Patient had a 

remote history of smoking quit in 1967.  Patient does have a elevated BNP denied

any significant the proximal nocturnal dyspnea questionable orthopnea.  Patient 

did get an echo cardiac exam which showed mildly decreased EF of around 40-45% 

and patient doesn't have a known history of can start failure patient does have 

severe tricuspid regurgitation and the moderate to severe pulmonary 

hypertension.  Patient has mildly elevated troponins without any significant 

chest pain.  Troponins are flat.  Cardiology will evaluate the patient.





11/17/2020


Patient is evaluated today resting the bed.  He is dyspneic however he states 

that he was just getting back from the bathroom.  There is no wheezing noted.  

Plan is for patient to undergo a stress test tomorrow.  He is 91% on 4 L nasal 

cannula.  Does not wear any oxygen at home.  Blood pressure today is 110/66, 

heart rate 69, temp 97.5.  Labs today show a white count of 10.62, hemoglobin 

10.4.  CO2 is 20, BUN 33, creatinine 1.4, glucoses in the 170s.  Patient 

continues on IV Rocephin, IV Lasix.  Chest x-ray completed this afternoon shows 

findings consistent with history of pneumonia.





ROS





Constitutional: Denied any fatigue denied any fever.


Cardio vascular: denied any chest pain, palpitations


Gastrointestinal denied any nausea vomiting


Pulmonary: Denies cough, reports shortness of breath with exertion


Neurologic denied any new focal deficits





All inpatient medications were reviewed and appropriate changes in these 

medications as dictated in the interval history and assessment and plan.





PHYSICAL EXAMINATION: 





GENERAL: The patient is alert and oriented x3, not in any acute distress.  Obese

HEENT: Pupils are round and equally reacting to light. EOMI. No scleral icterus.

No conjunctival pallor. Normocephalic, atraumatic. No pharyngeal erythema. No 

thyromegaly. 


CARDIOVASCULAR: S1 and S2 present. No murmurs, rubs, or gallops. 


PULMONARY: Chest is clear to auscultation, no wheezing or crackles. 


ABDOMEN: Soft, nontender, nondistended, normoactive bowel sounds. No palpable 

organomegaly. 


MUSCULOSKELETAL: No joint swelling or deformity.


EXTREMITIES: No cyanosis, clubbing, or pedal edema. 


NEUROLOGICAL: Gross neurological examination did not reveal any focal deficits. 


SKIN: No rashes. 





Assessment and plan


-Acute hypoxic respiratory failure probably secondary to pneumonia, chest x-ray 

demonstrates pneumonia, white count trending down


-Mild troponin elevation secondary to hypoxemia from pneumonia, not suggestive 

of ACS, stress test planned for tomorrow


-moderate to severe pulmonary hypertension probably secondary to mitral 

regurgitation and/or sleep apnea.


-Acute kidney injury, unsure whether there is a chronic disease component, 

creatinine today is 1.4, IV fluids stopped due to EF


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


-Severe tricuspid regurgitation


-Hypothyroidism


Type 2 diabetes mellitus: Hold off on metformin patient will be started on 

sliding scale insulin


DVT prophylaxis: Subcutaneous heparin





Plan


Repeat labs tomorrow


IV Lasix daily


Continue with IV and PO antibiotics


Continue nasal cannula, wean as tolerated


Stress test planned for tomorrow


Continue all other supportive care








Objective





- Vital Signs


Vital signs: 


                                   Vital Signs











Temp  97.5 F L  11/17/21 07:31


 


Pulse  69   11/17/21 07:31


 


Resp  18   11/17/21 07:31


 


BP  129/66   11/17/21 07:31


 


Pulse Ox  91 L  11/17/21 08:14








                                 Intake & Output











 11/16/21 11/17/21 11/17/21





 18:59 06:59 18:59


 


Output Total 200  


 


Balance -200  


 


Weight  97.522 kg 


 


Output:   


 


  Urine 200  


 


Other:   


 


  Voiding Method Toilet Toilet 


 


  # Voids 3  


 


  # Bowel Movements 2  














- Labs


CBC & Chem 7: 


                                 11/17/21 03:24





                                 11/17/21 03:24


Labs: 


                  Abnormal Lab Results - Last 24 Hours (Table)











  11/16/21 11/16/21 11/16/21 Range/Units





  08:25 11:42 16:45 


 


POC Glucose (mg/dL)   159 H  194 H  (75-99)  mg/dL


 


Troponin I  0.056 H*    (0.000-0.034)  ng/mL














  11/16/21 11/17/21 Range/Units





  20:41 08:02 


 


POC Glucose (mg/dL)  112 H  127 H  (75-99)  mg/dL


 


Troponin I    (0.000-0.034)  ng/mL








                      Microbiology - Last 24 Hours (Table)











 11/16/21 03:45 Blood Culture - Preliminary





 Blood    No Growth after 24 hours


 


 11/16/21 03:30 Blood Culture - Preliminary





 Blood    No Growth after 24 hours














Assessment and Plan


Time with Patient: Greater than 30

## 2021-11-17 NOTE — P.PN
Subjective


Progress Note Date: 11/17/21





HISTORY OF PRESENTING ILLNESS


This is a pleasant 81-year-old male past medical history significant for hyp

ertension, dyslipidemia, hypothyroidism, type 2 diabetes.  He does not follow 

with a cardiologist. We have been asked to see in consultation for elevated 

troponin. Patient is seen and examined in the emergency department. Patient 

presents to the emergency department with complaints of shortness of breath and 

non-productive cough. His symptoms started 11/10 and progressively were getting 

worse and decided presents emergency department for further evaluation.. He 

denies any chest pain, nausea, diaphoresis, palpitations, lightheadedness, 

dizziness, symptoms of presyncope or syncope.  Denies symptoms of orthopnea or 

PND. He denies fevers or chills.  He denies any history of MI, stroke, coronary 

artery disease.  He denies a family history of heart disease. He denies smoking,

states he quit smoking many years ago. 





DIAGNOSTICS


EKG reveals sinus rhythm with PVCs, left bundle branch block, heart rate 76, 

Left axis deviation. No prior EKG to compare


Echocardiogram revealed an EF 4045 percent, RV is mildly enlarged, mild to 

moderate mitral regurgitation, moderate severe tricuspid regurgitation, moderate

to severe pulmonary hypertension with RVSP of 57 mmHg


Telemetry tracings indicate sinus mechanism heart rates 70s80s.


Chest xray revealed diffuse right-sided pneumonia.


Laboratory reviewed, WBC 10.7, hemoglobin 11.6, platelets 550, d-dimer 0.91, 

sodium 135, potassium 4., BUN 37, serum creatinine 1.3, lactate 2.1, repeat 1.4,

troponin 0.06, 0.7, 0.05, proBNP 5008, COVID-19 PCR negative.


Current cardiac medications include simvastatin 40 mg nightly, lisinopril 20 mg 

daily, aspirin 81 mg daily, amlodipine 10 mg daily





11/17/2021


Patient examined this morning with Dr. Donaldson. The patient states he is 

feeling much better today. He reports improvement in his shortness of breath. He

remains on nasal cannula. He does report shortness of breath with exertion. He 

reports a nonproductive cough.  He denies chest pain or pressure.





PHYSICAL EXAMINATION


CONSTITUTIONAL: No apparent distress. 


HEENT: Head is normocephalic. Pupils are equal, round. Sclerae anicteric. Mucous

membranes of the mouth are moist.  No JVD. No carotid bruit.


CHEST EXAMINATION: Lungs are clear to auscultation. No chest wall tenderness is 

noted on palpation or with deep breathing. 


HEART EXAMINATION: Regular rate and rhythm. S1, S2 heard. Systolic ejection 

murmur at apex. 


ABDOMEN: Soft, nontender. Positive bowel sounds.


EXTREMITIES: 2+ peripheral pulses, no lower extremity edema and no calf 

tenderness.


NEUROLOGIC EXAMINATION: Patient is awake, alert and oriented x3. 





ASSESSMENT


Acute hypoxic respiratory failure probably secondary to pneumonia


Elevated troponin, mild, likely due to supply an demand mismatch, no consistent 

with acute coronary syndrome


Acute Kidney Injury


Severe tricuspid regurgitation 


History of hypertension


Dyslipidemia


Type 2 diabetes


Echocardiogram revealed cardiomyopathy with EF 40-45%, unclear if ischemic vs 

non-ischemic at this time


History of hypothyroidism 





PLAN


Continue current cardiac medications


Begin IV lasix 40mg daily


Accurate I&O


Daily weights


Patient to undergo Lexiscan stress test tomorrow secondary to cardiomyopathy (EF

40-45%) and abnormal troponins


Further recommendations pending patient course





Nurse Practitioner note has been reviewed, I agree with a documented findings 

and plan of care.  Patient was seen and examined.








Objective





- Vital Signs


Vital signs: 


                                   Vital Signs











Temp  97.5 F L  11/17/21 07:31


 


Pulse  69   11/17/21 08:00


 


Resp  18   11/17/21 08:00


 


BP  129/66   11/17/21 07:31


 


Pulse Ox  91 L  11/17/21 08:14








                                 Intake & Output











 11/16/21 11/17/21 11/17/21





 18:59 06:59 18:59


 


Output Total 200  


 


Balance -200  


 


Weight  97.522 kg 


 


Output:   


 


  Urine 200  


 


Other:   


 


  Voiding Method Toilet Toilet Toilet


 


  # Voids 3  


 


  # Bowel Movements 2  














- Labs


CBC & Chem 7: 


                                 11/17/21 03:24





                                 11/17/21 03:24


Labs: 


                  Abnormal Lab Results - Last 24 Hours (Table)











  11/16/21 11/16/21 11/16/21 Range/Units





  11:42 16:45 20:41 


 


WBC     (4.50-10.00)  X 10*3/uL


 


RBC     (4.40-5.60)  X 10*6/uL


 


Hgb     (13.0-17.0)  g/dL


 


Hct     (39.6-50.0)  %


 


Plt Count     (140-440)  X 10*3/uL


 


Carbon Dioxide     (21.6-31.8)  mmol/L


 


Anion Gap     (4.00-12.00)  mmol/L


 


BUN     (9.0-27.0)  mg/dL


 


Est GFR (CKD-EPI)AfAm     (60.0-200.0)   


 


Est GFR (CKD-EPI)NonAf     (60.0-200.0)   


 


BUN/Creatinine Ratio     (12.00-20.00)  Ratio


 


Glucose     ()  mg/dL


 


POC Glucose (mg/dL)  159 H  194 H  112 H  (75-99)  mg/dL


 


Calcium     (8.7-10.3)  mg/dL














  11/17/21 11/17/21 11/17/21 Range/Units





  03:24 03:24 08:02 


 


WBC  10.62 H    (4.50-10.00)  X 10*3/uL


 


RBC  3.44 L    (4.40-5.60)  X 10*6/uL


 


Hgb  10.4 L    (13.0-17.0)  g/dL


 


Hct  32.2 L    (39.6-50.0)  %


 


Plt Count  530 H    (140-440)  X 10*3/uL


 


Carbon Dioxide   20.1 L   (21.6-31.8)  mmol/L


 


Anion Gap   14.40 H   (4.00-12.00)  mmol/L


 


BUN   33.0 H   (9.0-27.0)  mg/dL


 


Est GFR (CKD-EPI)AfAm   54.7 L   (60.0-200.0)   


 


Est GFR (CKD-EPI)NonAf   47.2 L   (60.0-200.0)   


 


BUN/Creatinine Ratio   23.74 H   (12.00-20.00)  Ratio


 


Glucose   128 H   ()  mg/dL


 


POC Glucose (mg/dL)    127 H  (75-99)  mg/dL


 


Calcium   7.5 L   (8.7-10.3)  mg/dL








                      Microbiology - Last 24 Hours (Table)











 11/16/21 03:45 Blood Culture - Preliminary





 Blood    No Growth after 24 hours


 


 11/16/21 03:30 Blood Culture - Preliminary





 Blood    No Growth after 24 hours

## 2021-11-18 LAB
ANION GAP SERPL CALC-SCNC: 6 MMOL/L
BASOPHILS # BLD AUTO: 0.1 K/UL (ref 0–0.2)
BASOPHILS NFR BLD AUTO: 1 %
BUN SERPL-SCNC: 28 MG/DL (ref 9–20)
CALCIUM SPEC-MCNC: 7.8 MG/DL (ref 8.4–10.2)
CHLORIDE SERPL-SCNC: 104 MMOL/L (ref 98–107)
CO2 SERPL-SCNC: 27 MMOL/L (ref 22–30)
EOSINOPHIL # BLD AUTO: 0.5 K/UL (ref 0–0.7)
EOSINOPHIL NFR BLD AUTO: 6 %
ERYTHROCYTE [DISTWIDTH] IN BLOOD BY AUTOMATED COUNT: 3.88 M/UL (ref 4.3–5.9)
ERYTHROCYTE [DISTWIDTH] IN BLOOD: 12.8 % (ref 11.5–15.5)
GLUCOSE BLD-MCNC: 128 MG/DL (ref 75–99)
GLUCOSE BLD-MCNC: 133 MG/DL (ref 75–99)
GLUCOSE BLD-MCNC: 145 MG/DL (ref 75–99)
GLUCOSE BLD-MCNC: 148 MG/DL (ref 75–99)
GLUCOSE SERPL-MCNC: 161 MG/DL (ref 74–99)
HCT VFR BLD AUTO: 36.9 % (ref 39–53)
HGB BLD-MCNC: 11.8 GM/DL (ref 13–17.5)
LYMPHOCYTES # SPEC AUTO: 1.8 K/UL (ref 1–4.8)
LYMPHOCYTES NFR SPEC AUTO: 20 %
MCH RBC QN AUTO: 30.3 PG (ref 25–35)
MCHC RBC AUTO-ENTMCNC: 31.9 G/DL (ref 31–37)
MCV RBC AUTO: 95 FL (ref 80–100)
MONOCYTES # BLD AUTO: 0.9 K/UL (ref 0–1)
MONOCYTES NFR BLD AUTO: 10 %
NEUTROPHILS # BLD AUTO: 5.6 K/UL (ref 1.3–7.7)
NEUTROPHILS NFR BLD AUTO: 62 %
PLATELET # BLD AUTO: 587 K/UL (ref 150–450)
POTASSIUM SERPL-SCNC: 4.5 MMOL/L (ref 3.5–5.1)
SODIUM SERPL-SCNC: 137 MMOL/L (ref 137–145)
WBC # BLD AUTO: 9.1 K/UL (ref 3.8–10.6)

## 2021-11-18 RX ADMIN — METOPROLOL TARTRATE SCH MG: 25 TABLET, FILM COATED ORAL at 22:10

## 2021-11-18 RX ADMIN — INSULIN ASPART SCH UNIT: 100 INJECTION, SOLUTION INTRAVENOUS; SUBCUTANEOUS at 17:43

## 2021-11-18 RX ADMIN — ASPIRIN 81 MG CHEWABLE TABLET SCH MG: 81 TABLET CHEWABLE at 11:47

## 2021-11-18 RX ADMIN — AZITHROMYCIN SCH MG: 500 TABLET, FILM COATED ORAL at 11:48

## 2021-11-18 RX ADMIN — HEPARIN SODIUM SCH UNIT: 5000 INJECTION INTRAVENOUS; SUBCUTANEOUS at 22:10

## 2021-11-18 RX ADMIN — INSULIN ASPART SCH: 100 INJECTION, SOLUTION INTRAVENOUS; SUBCUTANEOUS at 06:11

## 2021-11-18 RX ADMIN — METOPROLOL TARTRATE SCH MG: 25 TABLET, FILM COATED ORAL at 11:48

## 2021-11-18 RX ADMIN — INSULIN ASPART SCH UNIT: 100 INJECTION, SOLUTION INTRAVENOUS; SUBCUTANEOUS at 11:46

## 2021-11-18 RX ADMIN — FUROSEMIDE SCH: 10 INJECTION, SOLUTION INTRAMUSCULAR; INTRAVENOUS at 12:04

## 2021-11-18 RX ADMIN — LEVOTHYROXINE SODIUM SCH MCG: 0.12 TABLET ORAL at 06:12

## 2021-11-18 RX ADMIN — HEPARIN SODIUM SCH UNIT: 5000 INJECTION INTRAVENOUS; SUBCUTANEOUS at 11:47

## 2021-11-18 RX ADMIN — LEVOTHYROXINE SODIUM SCH MCG: 0.12 TABLET ORAL at 11:47

## 2021-11-18 RX ADMIN — INSULIN ASPART SCH UNIT: 100 INJECTION, SOLUTION INTRAVENOUS; SUBCUTANEOUS at 22:10

## 2021-11-18 RX ADMIN — ATORVASTATIN CALCIUM SCH MG: 20 TABLET, FILM COATED ORAL at 22:10

## 2021-11-18 NOTE — P.PN
Subjective


Progress Note Date: 11/18/21


Patient is is a pleasant 81-year-old male came in with complaints of shortness 

of breath cough with sputum production found to have right upper lobe pneumonia.

 Patient does have leukocytosis and low-grade fever as well.  Patient had a 

remote history of smoking quit in 1967.  Patient does have a elevated BNP denied

any significant the proximal nocturnal dyspnea questionable orthopnea.  Patient 

did get an echo cardiac exam which showed mildly decreased EF of around 40-45% 

and patient doesn't have a known history of can start failure patient does have 

severe tricuspid regurgitation and the moderate to severe pulmonary 

hypertension.  Patient has mildly elevated troponins without any significant 

chest pain.  Troponins are flat.  Cardiology will evaluate the patient.





11/17/2020


Patient is evaluated today resting the bed.  He is dyspneic however he states 

that he was just getting back from the bathroom.  There is no wheezing noted.  

Plan is for patient to undergo a stress test tomorrow.  He is 91% on 4 L nasal 

cannula.  Does not wear any oxygen at home.  Blood pressure today is 110/66, 

heart rate 69, temp 97.5.  Labs today show a white count of 10.62, hemoglobin 

10.4.  CO2 is 20, BUN 33, creatinine 1.4, glucoses in the 170s.  Patient 

continues on IV Rocephin, IV Lasix.  Chest x-ray completed this afternoon shows 

findings consistent with history of pneumonia.





11/18/2021


Patient evaluated today status post Lexiscan stress test.  Vital signs today 

show a temp of 98.9, heart rate 69 sinus rhythm, blood pressure 163/68 and he is

91% 3 L nasal cannula.  Breathing is improved as compared to yesterday, there is

no wheezing noted.  Labs today show a hemoglobin of 11.8, sodium 137, potassium 

4.5, BUN 28, creatinine 1.27.  Sugars are running in the 160s.  Nuclear imaging 

stress test results cannot exclude a small stress-induced reversible ischemia 

involving the apical lateral myocardium.  There was nonspecific EKG portion 

secondary baseline EKG abnormalities with a left bundle branch block.  

Echocardiogram showed an EF of 40-45% with mild-to-moderate mitral regurgitation

and moderate to severe tricuspid regurgitation, moderate to severe pulmonary 

hypertension.  Further recommendations from cardiology.








ROS





Constitutional: Denied any fatigue denied any fever.


Cardio vascular: denied any chest pain, palpitations


Gastrointestinal denied any nausea vomiting


Pulmonary: Denies cough, reports shortness of breath with exertion


Neurologic denied any new focal deficits





All inpatient medications were reviewed and appropriate changes in these 

medications as dictated in the interval history and assessment and plan.





PHYSICAL EXAMINATION: 





GENERAL: The patient is alert and oriented x3, not in any acute distress.  Obese

HEENT: Pupils are round and equally reacting to light. EOMI. No scleral icterus.

No conjunctival pallor. Normocephalic, atraumatic. No pharyngeal erythema. No 

thyromegaly. 


CARDIOVASCULAR: S1 and S2 present. No murmurs, rubs, or gallops. 


PULMONARY: Chest is clear to auscultation, no wheezing or crackles. 


ABDOMEN: Soft, nontender, nondistended, normoactive bowel sounds. No palpable 

organomegaly. 


MUSCULOSKELETAL: No joint swelling or deformity.


EXTREMITIES: No cyanosis, clubbing, or pedal edema. 


NEUROLOGICAL: Gross neurological examination did not reveal any focal deficits. 


SKIN: No rashes. 





Assessment and plan


-Acute hypoxic respiratory failure probably secondary to pneumonia, chest x-ray 

demonstrates pneumonia, white count trending down


-Mild troponin elevation secondary to hypoxemia from pneumonia, unable to rule 

out ACS, possible stress-induced reversible ischemia found on stress test


-moderate to severe pulmonary hypertension probably secondary to mitral 

regurgitation and/or sleep apnea.


-Acute kidney injury, unsure whether there is a chronic disease component, 

creatinine today is 1.27


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


-Severe tricuspid regurgitation


-Hypothyroidism


Type 2 diabetes mellitus: Hold off on metformin patient will be started on 

sliding scale insulin


DVT prophylaxis: Subcutaneous heparin





Plan


Repeat labs tomorrow


PO lasix


Continue with IV and PO antibiotics


Continue nasal cannula, wean as tolerated


Further recommendations from cardiology regarding stress test results


Continue all other supportive care





Discharge when cleared by cardiology. 





Objective





- Vital Signs


Vital signs: 


                                   Vital Signs











Temp  99.3 F   11/18/21 04:00


 


Pulse  59 L  11/18/21 04:00


 


Resp  18   11/18/21 04:00


 


BP  146/56   11/18/21 04:00


 


Pulse Ox  92 L  11/18/21 04:00








                                 Intake & Output











 11/17/21 11/18/21 11/18/21





 18:59 06:59 18:59


 


Intake Total  10 


 


Balance  10 


 


Weight  101.9 kg 


 


Intake:   


 


  IV  10 


 


    Invasive Line 2  10 


 


Other:   


 


  Voiding Method Toilet Toilet 


 


  # Voids   1














- Labs


CBC & Chem 7: 


                                 11/18/21 11:16





                                 11/18/21 11:16


Labs: 


                  Abnormal Lab Results - Last 24 Hours (Table)











  11/17/21 11/17/21 11/17/21 Range/Units





  03:24 03:24 11:31 


 


WBC  10.62 H    (4.50-10.00)  X 10*3/uL


 


RBC  3.44 L    (4.40-5.60)  X 10*6/uL


 


Hgb  10.4 L    (13.0-17.0)  g/dL


 


Hct  32.2 L    (39.6-50.0)  %


 


Plt Count  530 H    (140-440)  X 10*3/uL


 


Carbon Dioxide   20.1 L   (21.6-31.8)  mmol/L


 


Anion Gap   14.40 H   (4.00-12.00)  mmol/L


 


BUN   33.0 H   (9.0-27.0)  mg/dL


 


Est GFR (CKD-EPI)AfAm   54.7 L   (60.0-200.0)   


 


Est GFR (CKD-EPI)NonAf   47.2 L   (60.0-200.0)   


 


BUN/Creatinine Ratio   23.74 H   (12.00-20.00)  Ratio


 


Glucose   128 H   ()  mg/dL


 


POC Glucose (mg/dL)    174 H  (75-99)  mg/dL


 


Calcium   7.5 L   (8.7-10.3)  mg/dL














  11/17/21 11/18/21 Range/Units





  20:02 05:49 


 


WBC    (4.50-10.00)  X 10*3/uL


 


RBC    (4.40-5.60)  X 10*6/uL


 


Hgb    (13.0-17.0)  g/dL


 


Hct    (39.6-50.0)  %


 


Plt Count    (140-440)  X 10*3/uL


 


Carbon Dioxide    (21.6-31.8)  mmol/L


 


Anion Gap    (4.00-12.00)  mmol/L


 


BUN    (9.0-27.0)  mg/dL


 


Est GFR (CKD-EPI)AfAm    (60.0-200.0)   


 


Est GFR (CKD-EPI)NonAf    (60.0-200.0)   


 


BUN/Creatinine Ratio    (12.00-20.00)  Ratio


 


Glucose    ()  mg/dL


 


POC Glucose (mg/dL)  210 H  128 H  (75-99)  mg/dL


 


Calcium    (8.7-10.3)  mg/dL








                      Microbiology - Last 24 Hours (Table)











 11/16/21 03:30 Blood Culture - Preliminary





 Blood    No Growth after 48 hours


 


 11/16/21 03:45 Blood Culture - Preliminary





 Blood    No Growth after 48 hours














Assessment and Plan


Time with Patient: Greater than 30

## 2021-11-18 NOTE — NM
EXAMINATION TYPE: NM stress lexiscan cardiolite

 

DATE OF EXAM: 11/18/2021

 

COMPARISON: NONE

 

HISTORY: Cardiomyopathy

 

TECHNIQUE:  After the intravenous administration of 10.4 mCi Tc 99m Sestamibi - Cardiolite resting SP
ECT images acquired 65 minutes post injection. 

 

The patient received 0.4mg Lexiscan, 27.1 mCi Tc 99m Sestamibi - Stress images obtained 60 minutes po
st injection 

 

FINDINGS:  

 

Review of stress and rest SPECT images demonstrates question of a small reversible perfusion defect i
nvolving the apical lateral portion of the myocardium.  Gated analysis shows normal wall motion with 
an estimated left ventricular ejection fraction of 49 %.

 

 

 

IMPRESSION:

1. Cannot exclude a small stress-induced reversible ischemia involving the apical lateral myocardium.
 Correlate clinically.

## 2021-11-18 NOTE — P.PN
Subjective


HISTORY OF PRESENTING ILLNESS


This is a pleasant 81-year-old male past medical history significant for 

hypertension, dyslipidemia, hypothyroidism, type 2 diabetes.  He does not follow

with a cardiologist. We have been asked to see in consultation for elevated 

troponin. Patient is seen and examined in the emergency department. Patient 

presents to the emergency department with complaints of shortness of breath and 

non-productive cough. His symptoms started 11/10 and progressively were getting 

worse and decided presents emergency department for further evaluation.. He 

denies any chest pain, nausea, diaphoresis, palpitations, lightheadedness, di

zziness, symptoms of presyncope or syncope.  Denies symptoms of orthopnea or 

PND. He denies fevers or chills.  He denies any history of MI, stroke, coronary 

artery disease.  He denies a family history of heart disease. He denies smoking,

states he quit smoking many years ago. 





DIAGNOSTICS


EKG reveals sinus rhythm with PVCs, left bundle branch block, heart rate 76, 

Left axis deviation. No prior EKG to compare


Echocardiogram revealed an EF 4045% RV is mildly enlarged, mild to moderate 

mitral regurgitation, moderate severe tricuspid regurgitation, moderate to 

severe pulmonary hypertension with RVSP of 57 mmHg





11/18/21


Patient seen at bedside after stress test. His Lexiscan revealed cannot exclude 

small stress-induced reversible ischemia involving the apical lateral 

mycoardium.  He reports improvement in his shortness of breath. He is on 3L 

Nasal cannula SpO2 90%.  He does report shortness of breath with exertion. He 

reports a nonproductive cough.  He denies chest pain or pressure.





PHYSICAL EXAMINATION


CONSTITUTIONAL: No apparent distress. 


HEENT: Neck Supple No JVD. 


CHEST EXAMINATION: Lungs are clear to auscultation. No chest wall tenderness is 

noted on palpation or with deep breathing. 


HEART EXAMINATION: Regular rate and rhythm. S1, S2 heard. Systolic ejection 

murmur at apex. 


ABDOMEN: Soft, nontender. Positive bowel sounds.


EXTREMITIES: 2+ peripheral pulses, no lower extremity edema and no calf 

tenderness.


NEUROLOGIC EXAMINATION: Patient is awake, alert and oriented x3. 





ASSESSMENT


Acute hypoxic respiratory failure probably secondary to pneumonia


Elevated troponin, mild, likely due to supply an demand mismatch


Acute Kidney Injury


Severe tricuspid regurgitation 


History of hypertension


Dyslipidemia


Type 2 diabetes


Echocardiogram revealed cardiomyopathy with EF 40-45%, unclear if ischemic vs 

non-ischemic at this time


History of hypothyroidism 


Lexiscan stresst test 11/18 cannot exclude small stress-induced reversible 

ischemia involving the apical lateral myocardium. 





PLAN


-At this time, we will continue with current medical therapy with aspirin, 

statin, ACEI, and beta blocker 


-Transition to PO Lasix 40mg daily 


-Patient will need a cardiac catheterization, most likely will let patient 

recover from Pneumonia with hypoxia and this can be performed at a later day. 

Discussed Lexiscan stress test results with Dr. Salazar.


-I also discussed the risks, benefits and alternative therapies for left heart 

catheterization with patient and for both sedation/analgesia to this patient.  

The patient has indicated understanding of the risks and procedures discussed. 

At this time, he is not interested in the cardiac catheterization during this 

hospitalization, however, recommend monitor patient overnight due to hypoxia and

will address again with patient tomorrow. 


-Further recommendations based on clinical course. 





Nurse Practitioner note has been reviewed, I agree with a documented findings 

and plan of care.  Patient was seen and examined.











Objective





- Vital Signs


Vital signs: 


                                   Vital Signs











Temp  98.9 F   11/18/21 11:40


 


Pulse  69   11/18/21 11:40


 


Resp  19   11/18/21 12:01


 


BP  163/68   11/18/21 11:40


 


Pulse Ox  91 L  11/18/21 12:01








                                 Intake & Output











 11/17/21 11/18/21 11/18/21





 18:59 06:59 18:59


 


Intake Total  10 240


 


Balance  10 240


 


Weight  101.9 kg 101.9 kg


 


Intake:   


 


  IV  10 


 


    Invasive Line 2  10 


 


  Oral   240


 


Other:   


 


  Voiding Method Toilet Toilet Toilet


 


  # Voids   1


 


  # Bowel Movements   1














- Labs


CBC & Chem 7: 


                                 11/18/21 11:16





                                 11/18/21 11:16


Labs: 


                  Abnormal Lab Results - Last 24 Hours (Table)











  11/17/21 11/18/21 11/18/21 Range/Units





  20:02 05:49 11:16 


 


RBC    3.88 L  (4.30-5.90)  m/uL


 


Hgb    11.8 L  (13.0-17.5)  gm/dL


 


Hct    36.9 L  (39.0-53.0)  %


 


Plt Count    587 H  (150-450)  k/uL


 


BUN     (9-20)  mg/dL


 


Creatinine     (0.66-1.25)  mg/dL


 


Glucose     (74-99)  mg/dL


 


POC Glucose (mg/dL)  210 H  128 H   (75-99)  mg/dL


 


Calcium     (8.4-10.2)  mg/dL














  11/18/21 11/18/21 Range/Units





  11:16 11:38 


 


RBC    (4.30-5.90)  m/uL


 


Hgb    (13.0-17.5)  gm/dL


 


Hct    (39.0-53.0)  %


 


Plt Count    (150-450)  k/uL


 


BUN  28 H   (9-20)  mg/dL


 


Creatinine  1.27 H   (0.66-1.25)  mg/dL


 


Glucose  161 H   (74-99)  mg/dL


 


POC Glucose (mg/dL)   145 H  (75-99)  mg/dL


 


Calcium  7.8 L   (8.4-10.2)  mg/dL








                      Microbiology - Last 24 Hours (Table)











 11/16/21 03:30 Blood Culture - Preliminary





 Blood    No Growth after 48 hours


 


 11/16/21 03:45 Blood Culture - Preliminary





 Blood    No Growth after 48 hours

## 2021-11-18 NOTE — P.STRESS
- Stress Test Note


Stress Test Results/Findings: 


Exam Performed: NM stress lexiscan cardiolite


Exam Date: 11/18/21


Reason for Exam: CARDIOMYOPATHY





Height: 5 ft 10 in


Weight: 101.9 kg


Protocol: LEXISCAN CARDIOLITE


Stage: NA


Duration of Exercise: NA





Resting Heart Rate: 62


Resting Blood Pressure: 150/67


Maximum Achieved Heart Rate: 71


Maximum Achieved Blood Pressure: 150/67


85% PMHR: 118


100% PMHR: 139


METS: NA


Technologist Comment: 





Stress Test Results/Findings: 


At baseline EKG showed normal sinus rhythm, left bundle branch block.  Patient 

recieved IV infusion of Lexiscan 0.4mg and at peak infusion EKG showed no 

significant change from baseline.





Conclusions:


1.  Nonspecific EKG portion secondary baseline EKG abnormalities with a left 

bundle-branch block.


2. Nuclear imaging to be reported separately.

## 2021-11-19 VITALS
HEART RATE: 68 BPM | RESPIRATION RATE: 19 BRPM | TEMPERATURE: 98 F | DIASTOLIC BLOOD PRESSURE: 73 MMHG | SYSTOLIC BLOOD PRESSURE: 161 MMHG

## 2021-11-19 LAB
GLUCOSE BLD-MCNC: 137 MG/DL (ref 75–99)
GLUCOSE BLD-MCNC: 141 MG/DL (ref 75–99)

## 2021-11-19 RX ADMIN — LEVOTHYROXINE SODIUM SCH MCG: 0.12 TABLET ORAL at 06:30

## 2021-11-19 RX ADMIN — AZITHROMYCIN SCH MG: 500 TABLET, FILM COATED ORAL at 09:56

## 2021-11-19 RX ADMIN — INSULIN ASPART SCH: 100 INJECTION, SOLUTION INTRAVENOUS; SUBCUTANEOUS at 12:15

## 2021-11-19 RX ADMIN — HEPARIN SODIUM SCH UNIT: 5000 INJECTION INTRAVENOUS; SUBCUTANEOUS at 09:56

## 2021-11-19 RX ADMIN — ASPIRIN 81 MG CHEWABLE TABLET SCH MG: 81 TABLET CHEWABLE at 09:56

## 2021-11-19 RX ADMIN — INSULIN ASPART SCH: 100 INJECTION, SOLUTION INTRAVENOUS; SUBCUTANEOUS at 07:04

## 2021-11-19 NOTE — PN
PROGRESS NOTE



Mr. Montana is an 81-year-old gentleman admitted to the hospital with shortness of

breath, was found to have a pneumonia and is on antibiotics.  Because of mildly

elevated troponin, and a slightly abnormal echo with mild lateral wall hypokinesia, he

was advised a Lexiscan stress test which revealed a questionable area of reversible

defect.  I have reviewed the scan myself.  The area that is in question is very small

and to me does not seem significant.  Patient however has significant risk factors.  I

am recommending that we will treat him with medications at this time and upon discharge

I will see him in the office on the thirtieth of this month.  Once his pneumonia is

cleared, I will re-evaluate him in the office and make further recommendations.  He

will be on aspirin, statin, and beta blockers.  He is asymptomatic.  He has not had any

episodes of chest pain.  He is resting comfortably without symptoms.



Vital signs stable. No JVD. S1-S2 heard normally.  Short systolic murmur noted.  Lungs

are clear.  Abdomen is soft, nontender.  Lower extremities reveal normal pulses.  No

edema.  Central nervous system is normal.



RECOMMENDATIONS:

Plan is to treat him medically and after discharge I will see him in the office on the

thirtieth and we will then discuss different options including cardiac catheterization.





MMODL / IJN: [id number] / Job#: [id number]

## 2021-11-19 NOTE — EST
Stress Test Results/Findings: 

Exam Performed: NM stress lexiscan cardiolite

Exam Date: 11/18/21

Reason for Exam: CARDIOMYOPATHY



Height: 5 ft 10 in

Weight: 101.9 kg

Protocol: LEXISCAN CARDIOLITE

Stage: NA

Duration of Exercise: NA



Resting Heart Rate: 62

Resting Blood Pressure: 150/67

Maximum Achieved Heart Rate: 71

Maximum Achieved Blood Pressure: 150/67

85% PMHR: 118

100% PMHR: 139

METS: NA

Technologist Comment: 



Stress Test Results/Findings: 

At baseline EKG showed normal sinus rhythm, left bundle branch block.  Patient 
recieved IV infusion of Lexiscan 0.4mg and at peak infusion EKG showed no 
significant change from baseline.



Conclusions:

1.  Nonspecific EKG portion secondary baseline EKG abnormalities with a left 
bundle-branch block.

2. Nuclear imaging to be reported separately. 

MTDD

## 2021-11-20 NOTE — P.DS
Providers


Date of admission: 


11/16/21 03:15





Expected date of discharge: 11/19/21


Attending physician: 


Rei Tran





Consults: 





                                        





11/16/21 03:34


Consult Physician Urgent 


   Consulting Provider: [facility]


   Consult Reason/Comments: elevated trop, CHF


   Do you want consulting provider notified?: Yes, Notify in am











Primary care physician: 


[facility]





Hospital Course: 








Final Diagnosis





-Acute hypoxic respiratory failure secondary to pneumonia


-Mild troponin elevation secondary to hypoxemia from pneumonia, unable to rule 

out ACS, possible stress-induced reversible ischemia found on stress test


-moderate to severe pulmonary hypertension probably secondary to mitral 

regurgitation and/or sleep apnea.


-Acute kidney injury, unsure whether there is a chronic disease component


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


-Severe tricuspid regurgitation


-Hypothyroidism


-Type 2 diabetes mellitus


-DVT prophylaxis


-No code











Discharge Disposition


Patient is being discharged in stable condition with guarded prognosis to home 

and will continue with home care in the outpatient setting.  Patient to follow-

up with primary care provider at the [facility] upon discharge along with 

cardiology in the outpatient setting.  Patient to complete a short course of 

cefdinir and zithromax to complete the course. Total time taken is greater than 

35 minutes. 





Hospital Course





Patient is is a pleasant 81-year-old male came in with complaints of shortness 

of breath cough with sputum production found to have right upper lobe pneumonia.

 Patient does have leukocytosis and low-grade fever as well.  Patient had a 

remote history of smoking quit in 1967.  Patient does have a elevated BNP denied

any significant the proximal nocturnal dyspnea questionable orthopnea.  Patient 

did get an echo cardiac exam which showed mildly decreased EF of around 40-45% 

and patient doesn't have a known history of can start failure patient does have 

severe tricuspid regurgitation and the moderate to severe pulmonary 

hypertension.  Patient has mildly elevated troponins without any significant 

chest pain.  Troponins are flat.  Cardiology will evaluate the patient.





11/17/2020


Patient is evaluated today resting the bed.  He is dyspneic however he states 

that he was just getting back from the bathroom.  There is no wheezing noted.  

Plan is for patient to undergo a stress test tomorrow.  He is 91% on 4 L nasal 

cannula.  Does not wear any oxygen at home.  Blood pressure today is 110/66, 

heart rate 69, temp 97.5.  Labs today show a white count of 10.62, hemoglobin 

10.4.  CO2 is 20, BUN 33, creatinine 1.4, glucoses in the 170s.  Patient 

continues on IV Rocephin, IV Lasix.  Chest x-ray completed this afternoon shows 

findings consistent with history of pneumonia.





11/18/2021


Patient evaluated today status post Lexiscan stress test.  Vital signs today 

show a temp of 98.9, heart rate 69 sinus rhythm, blood pressure 163/68 and he is

91% 3 L nasal cannula.  Breathing is improved as compared to yesterday, there is

no wheezing noted.  Labs today show a hemoglobin of 11.8, sodium 137, potassium 

4.5, BUN 28, creatinine 1.27.  Sugars are running in the 160s.  Nuclear imaging 

stress test results cannot exclude a small stress-induced reversible ischemia 

involving the apical lateral myocardium.  There was nonspecific EKG portion 

secondary baseline EKG abnormalities with a left bundle branch block.  

Echocardiogram showed an EF of 40-45% with mild-to-moderate mitral regurgitation

and moderate to severe tricuspid regurgitation, moderate to severe pulmonary 

hypertension.  Further recommendations from cardiology.





11/19/2021


Patient is seen in follow up this morning and anticipating discharge. Patient 

was evaluated by cardiology and recommending cardiac catheterization and patient

has opted to continue current medication regimen and discuss outpatient cardiac 

catheterization. Patient is requiring oxygen at 2 Liter via NC secondary to CHF 

and is being planned with case management for delivery to the room. Patient will

continue on oral cefdinir and zithromax for the next few days to complete the 

course.   Currently no reports of chest pain, shortness of breath, or pa

lpitations.  Patient is afebrile.  No reports of nausea or vomiting and patient 

is tolerating diet.    Patient will be discharged home today with home care.  

Guarded prognosis.





GENERAL: The patient is alert and oriented x3, Well developed, well nourished. 


HEENT: Pupils are round and equally reacting to light. EOMI. No scleral icterus.

 Does have conjunctival pallor. Normocephalic, atraumatic. No pharyngeal 

erythema. No thyromegaly. 


CARDIOVASCULAR: S1 and S2 muffled 


PULMONARY: diminished breath sounds bilaterally with coarse rhonchi noted.  


ABDOMEN: Soft, non-tender, normoactive bowel sounds. No palpable organomegaly. 


MUSCULOSKELETAL: No joint swelling or deformity.


EXTREMITIES: No cyanosis, clubbing, or pedal edema. 


NEUROLOGICAL: Gross neurological examination did not reveal any focal deficits. 


SKIN: No rashes. 





Please see medication reconcilation for list of current medications.




















Patient Condition at Discharge: Stable





Plan - Discharge Summary


Discharge Rx Participant: No


New Discharge Prescriptions: 


New


   Metoprolol Tartrate [Lopressor] 25 mg PO BID 30 Days #60 tab


   Acetaminophen Tab [Tylenol] 650 mg PO Q6HR PRN  tab


     PRN Reason: Mild Pain Or Fever > 100.5


   Azithromycin [Zithromax] 500 mg PO DAILY 3 Days #3 tab


   Furosemide [Lasix] 40 mg PO DAILY 30 Days #30 tab


   Cefdinir [Omnicef] 300 mg PO BID 5 Days #10 cap





Continue


   lisinopriL [Prinivil] 20 mg PO DAILY


   Levothyroxine Sodium [Synthroid] 125 mcg PO DAILY


   Carboxymethylcellulose Sodium [Refresh Tears] 1 drop BOTH EYES QID PRN


     PRN Reason: Dry Eye(S)


   Aspirin EC [Ecotrin Low Dose] 81 mg PO Q48H


   Sildenafil Citrate [Viagra] 100 mg PO DAILY PRN


     PRN Reason: sexual intercourse


   metFORMIN  mg PO BID


   Simvastatin [Zocor] 40 mg PO HS





Discontinued


   amLODIPine [Norvasc] 10 mg PO DAILY


Discharge Medication List





Aspirin EC [Ecotrin Low Dose] 81 mg PO Q48H 11/16/21 [History]


Carboxymethylcellulose Sodium [Refresh Tears] 1 drop BOTH EYES QID PRN 11/16/21 

[History]


Levothyroxine Sodium [Synthroid] 125 mcg PO DAILY 11/16/21 [History]


Sildenafil Citrate [Viagra] 100 mg PO DAILY PRN 11/16/21 [History]


Simvastatin [Zocor] 40 mg PO HS 11/16/21 [History]


lisinopriL [Prinivil] 20 mg PO DAILY 11/16/21 [History]


metFORMIN  mg PO BID 11/16/21 [History]


Acetaminophen Tab [Tylenol] 650 mg PO Q6HR PRN  tab 11/19/21 [Rx]


Azithromycin [Zithromax] 500 mg PO DAILY 3 Days #3 tab 11/19/21 [Rx]


Cefdinir [Omnicef] 300 mg PO BID 5 Days #10 cap 11/19/21 [Rx]


Furosemide [Lasix] 40 mg PO DAILY 30 Days #30 tab 11/19/21 [Rx]


Metoprolol Tartrate [Lopressor] 25 mg PO BID 30 Days #60 tab 11/19/21 [Rx]








Follow up Appointment(s)/Referral(s): 


Tesfaye Salazar MD [STAFF PHYSICIAN] - 11/30/21 3:15 pm (At [facility], by the [facility])


[facility] [Primary Care Provider] - 11/29/21 2:30 pm


Patient Instructions/Handouts:  Heart Attack (DC), Community Acquired Pneumonia 

(DC)


Activity/Diet/Wound Care/Special Instructions: 


Patient will require home oxygen at discharge to manage CHF





Activity Limited until follow-up


Follow-up with primary care provider on discharge


Continue taking medications as prescribed


Continue antibiotics until finished


Follow-up cardiology as discussed and scheduled


continue with heart healthy diabetic diet





Discharge/Stand Alone Forms:  Personal Care Manager


Discharge Disposition: HOME SELF-CARE

## 2022-03-25 ENCOUNTER — HOSPITAL ENCOUNTER (INPATIENT)
Dept: HOSPITAL 47 - EC | Age: 82
LOS: 3 days | Discharge: HOME | DRG: 291 | End: 2022-03-28
Attending: INTERNAL MEDICINE | Admitting: INTERNAL MEDICINE
Payer: OTHER GOVERNMENT

## 2022-03-25 VITALS — BODY MASS INDEX: 28.3 KG/M2

## 2022-03-25 DIAGNOSIS — Z82.49: ICD-10-CM

## 2022-03-25 DIAGNOSIS — I13.0: Primary | ICD-10-CM

## 2022-03-25 DIAGNOSIS — Z87.01: ICD-10-CM

## 2022-03-25 DIAGNOSIS — I08.1: ICD-10-CM

## 2022-03-25 DIAGNOSIS — Z87.891: ICD-10-CM

## 2022-03-25 DIAGNOSIS — E78.5: ICD-10-CM

## 2022-03-25 DIAGNOSIS — I49.3: ICD-10-CM

## 2022-03-25 DIAGNOSIS — R77.8: ICD-10-CM

## 2022-03-25 DIAGNOSIS — Z79.84: ICD-10-CM

## 2022-03-25 DIAGNOSIS — N17.9: ICD-10-CM

## 2022-03-25 DIAGNOSIS — Z79.890: ICD-10-CM

## 2022-03-25 DIAGNOSIS — E11.22: ICD-10-CM

## 2022-03-25 DIAGNOSIS — Z82.0: ICD-10-CM

## 2022-03-25 DIAGNOSIS — I42.9: ICD-10-CM

## 2022-03-25 DIAGNOSIS — Z79.899: ICD-10-CM

## 2022-03-25 DIAGNOSIS — I50.23: ICD-10-CM

## 2022-03-25 DIAGNOSIS — I27.20: ICD-10-CM

## 2022-03-25 DIAGNOSIS — N18.30: ICD-10-CM

## 2022-03-25 DIAGNOSIS — T50.2X5A: ICD-10-CM

## 2022-03-25 DIAGNOSIS — E89.0: ICD-10-CM

## 2022-03-25 LAB
ALBUMIN SERPL-MCNC: 3.7 G/DL (ref 3.5–5)
ALP SERPL-CCNC: 101 U/L (ref 38–126)
ALT SERPL-CCNC: 12 U/L (ref 4–49)
ANION GAP SERPL CALC-SCNC: 11 MMOL/L
APTT BLD: 25.4 SEC (ref 22–30)
AST SERPL-CCNC: 26 U/L (ref 17–59)
BASOPHILS # BLD AUTO: 0.1 K/UL (ref 0–0.2)
BASOPHILS NFR BLD AUTO: 2 %
BUN SERPL-SCNC: 27 MG/DL (ref 9–20)
CALCIUM SPEC-MCNC: 7.9 MG/DL (ref 8.4–10.2)
CHLORIDE SERPL-SCNC: 103 MMOL/L (ref 98–107)
CO2 SERPL-SCNC: 24 MMOL/L (ref 22–30)
EOSINOPHIL # BLD AUTO: 0.3 K/UL (ref 0–0.7)
EOSINOPHIL NFR BLD AUTO: 5 %
ERYTHROCYTE [DISTWIDTH] IN BLOOD BY AUTOMATED COUNT: 4.64 M/UL (ref 4.3–5.9)
ERYTHROCYTE [DISTWIDTH] IN BLOOD: 15.1 % (ref 11.5–15.5)
GLUCOSE BLD-MCNC: 143 MG/DL (ref 75–99)
GLUCOSE SERPL-MCNC: 156 MG/DL (ref 74–99)
HCT VFR BLD AUTO: 41.5 % (ref 39–53)
HGB BLD-MCNC: 13.2 GM/DL (ref 13–17.5)
INR PPP: 1 (ref ?–1.2)
LYMPHOCYTES # SPEC AUTO: 2 K/UL (ref 1–4.8)
LYMPHOCYTES NFR SPEC AUTO: 34 %
MAGNESIUM SPEC-SCNC: 1.8 MG/DL (ref 1.6–2.3)
MCH RBC QN AUTO: 28.5 PG (ref 25–35)
MCHC RBC AUTO-ENTMCNC: 31.9 G/DL (ref 31–37)
MCV RBC AUTO: 89.5 FL (ref 80–100)
MONOCYTES # BLD AUTO: 0.6 K/UL (ref 0–1)
MONOCYTES NFR BLD AUTO: 11 %
NEUTROPHILS # BLD AUTO: 2.7 K/UL (ref 1.3–7.7)
NEUTROPHILS NFR BLD AUTO: 46 %
PLATELET # BLD AUTO: 346 K/UL (ref 150–450)
POTASSIUM SERPL-SCNC: 3.7 MMOL/L (ref 3.5–5.1)
PROT SERPL-MCNC: 7.8 G/DL (ref 6.3–8.2)
PT BLD: 11.2 SEC (ref 9–12)
SODIUM SERPL-SCNC: 138 MMOL/L (ref 137–145)
T4 FREE SERPL-MCNC: 1.6 NG/DL (ref 0.78–2.19)
WBC # BLD AUTO: 5.9 K/UL (ref 3.8–10.6)

## 2022-03-25 PROCEDURE — 84439 ASSAY OF FREE THYROXINE: CPT

## 2022-03-25 PROCEDURE — 80048 BASIC METABOLIC PNL TOTAL CA: CPT

## 2022-03-25 PROCEDURE — 85730 THROMBOPLASTIN TIME PARTIAL: CPT

## 2022-03-25 PROCEDURE — 83605 ASSAY OF LACTIC ACID: CPT

## 2022-03-25 PROCEDURE — 84443 ASSAY THYROID STIM HORMONE: CPT

## 2022-03-25 PROCEDURE — 84145 PROCALCITONIN (PCT): CPT

## 2022-03-25 PROCEDURE — 85025 COMPLETE CBC W/AUTO DIFF WBC: CPT

## 2022-03-25 PROCEDURE — 99285 EMERGENCY DEPT VISIT HI MDM: CPT

## 2022-03-25 PROCEDURE — 96374 THER/PROPH/DIAG INJ IV PUSH: CPT

## 2022-03-25 PROCEDURE — 71046 X-RAY EXAM CHEST 2 VIEWS: CPT

## 2022-03-25 PROCEDURE — 84156 ASSAY OF PROTEIN URINE: CPT

## 2022-03-25 PROCEDURE — 36415 COLL VENOUS BLD VENIPUNCTURE: CPT

## 2022-03-25 PROCEDURE — 83036 HEMOGLOBIN GLYCOSYLATED A1C: CPT

## 2022-03-25 PROCEDURE — 84484 ASSAY OF TROPONIN QUANT: CPT

## 2022-03-25 PROCEDURE — 81001 URINALYSIS AUTO W/SCOPE: CPT

## 2022-03-25 PROCEDURE — 82570 ASSAY OF URINE CREATININE: CPT

## 2022-03-25 PROCEDURE — 93005 ELECTROCARDIOGRAM TRACING: CPT

## 2022-03-25 PROCEDURE — 83880 ASSAY OF NATRIURETIC PEPTIDE: CPT

## 2022-03-25 PROCEDURE — 83735 ASSAY OF MAGNESIUM: CPT

## 2022-03-25 PROCEDURE — 85610 PROTHROMBIN TIME: CPT

## 2022-03-25 PROCEDURE — 80053 COMPREHEN METABOLIC PANEL: CPT

## 2022-03-25 RX ADMIN — FAMOTIDINE SCH MG: 10 INJECTION, SOLUTION INTRAVENOUS at 09:07

## 2022-03-25 RX ADMIN — FAMOTIDINE SCH MG: 10 INJECTION, SOLUTION INTRAVENOUS at 20:54

## 2022-03-25 RX ADMIN — METOPROLOL TARTRATE SCH MG: 25 TABLET, FILM COATED ORAL at 09:07

## 2022-03-25 RX ADMIN — SODIUM CHLORIDE, PRESERVATIVE FREE SCH ML: 5 INJECTION INTRAVENOUS at 20:54

## 2022-03-25 RX ADMIN — SPIRONOLACTONE SCH MG: 25 TABLET, FILM COATED ORAL at 09:06

## 2022-03-25 RX ADMIN — HEPARIN SODIUM SCH UNIT: 5000 INJECTION INTRAVENOUS; SUBCUTANEOUS at 20:54

## 2022-03-25 RX ADMIN — ATORVASTATIN CALCIUM SCH MG: 20 TABLET, FILM COATED ORAL at 20:53

## 2022-03-25 RX ADMIN — SODIUM CHLORIDE, PRESERVATIVE FREE SCH ML: 5 INJECTION INTRAVENOUS at 09:07

## 2022-03-25 RX ADMIN — METOPROLOL TARTRATE SCH MG: 25 TABLET, FILM COATED ORAL at 20:53

## 2022-03-25 RX ADMIN — HEPARIN SODIUM SCH UNIT: 5000 INJECTION INTRAVENOUS; SUBCUTANEOUS at 09:07

## 2022-03-25 RX ADMIN — FUROSEMIDE SCH MG: 10 INJECTION, SOLUTION INTRAMUSCULAR; INTRAVENOUS at 17:10

## 2022-03-25 RX ADMIN — LEVOTHYROXINE SODIUM SCH MCG: 0.12 TABLET ORAL at 09:06

## 2022-03-25 RX ADMIN — METOPROLOL TARTRATE SCH MG: 25 TABLET, FILM COATED ORAL at 17:10

## 2022-03-25 NOTE — P.CRDCN
History of Present Illness


Consult date: 03/25/22


History of present illness: 





History of Present Illness:


The patient is an 82-year-old male who presents with symptoms of progressive 

dyspnea.  He was in the hospital in November with pneumonia and dyspnea and at 

that time his echocardiogram showed an ejection fraction of 40-45% with 

mild-to-moderate mitral and moderate to severe tricuspid regurgitation was 

moderate to severe pulmonary hypertension.  He had mild elevation of his 

troponin at that time and had an MPI that showed an apical lateral wall small 

area of possible ischemia.  His ejection fraction was 49%.  The patient was 

scheduled to be seen by Dr. Salazar for discussing cardiac catheterization but he 

did not keep his appointment.  For the last couple weeks he's been complaining 

of dyspnea on exertion without any chest discomfort.  He has a mild cough, 

nonproductive.  He denies any fever, PND or orthopnea.  He denies any peripheral

edema.  On the monitor he is in sinus mechanism with episodes of frequent PVCs, 

asymptomatic.  The patient has no prior history of documented ischemic heart 

disease in the past.  He denies any dizziness or syncope.  He is relatively 

compliant with low salt intake.  His coronary risk factors are positive for 

hypertension and hyperlipidemia, he stopped smoking over 30 years ago.  He is 

diabetic.


His medication at home include metformin 500 mg twice a day, Prinivil 20 mg 

daily, simvastatin 40 mg daily, metoprolol tartrate 25 mg twice a day, Lasix 40 

mg daily, levothyroxine and aspirin once a day.





Review of Systems:


Respiratory: He has a history of dyspnea with no significant cough


GI: She had nausea and vomiting today. No history of peptic ulcer disease. No 

recent GI bleed.


: No hematuria or dysuria.


Nervous System: No stroke or seizure.





Physical Examination: 


82-year-old male, alert oriented no apparent distress.  Blood pressure running 

in the 160s to 180s, heart rate in the 70s


Head: Normocephalic.


Eyes: Sclerae nonicteric.


Neck: Good carotid upstroke, no bruit, no jugular venous distention.


Lungs: Few crackles at the bases


Heart: Regular rate and rhythm, S1-S2, no S3, no rub.  Systolic murmur at the 

apex, 2/6.


Abdomen: Soft nontender, positive bowel sounds no organomegaly.


Extremities: No edema, intact distal pulses.





Labs: 


BUN 27, creatinine 1.07, hemoglobin 13.2, NT proBNP 10,300.  Troponin 0.062.  

EKG shows sinus mechanism with left bundle branch block, first-degree AV block 

and left axis deviation.  Chest x-ray consistent was lung congestion





Impression:


1.  Progressive dyspnea with evidence of congestive heart failure with moderate

ly impaired systolic function in the past and evidence of pulmonary hypertension


2.  Mild troponin elevation of unclear etiology, no evidence to suggest acute 

coronary syndrome.  Probably type II event.  Patient had an MPI in November and 

was scheduled to be evaluated for possible cardiac catheterization


3.  Hypertension, elevated


4.  Hyperlipidemia


5.  Diabetes mellitus





Plan:


1.  Continue IV diuretics for 24 hours


2.  Follow renal functions


3.  Adjust antihypertensive regimen


4.  Patient would require cardiac catheterization to further assess his status 

and guide his treatment.  He will be reassessed in the next 24-48 hours 

regarding the timing of his cardiac catheterization.If he has no evidence of 

obstructive coronary disease and continues to have symptoms of CHF he may be a 

candidate for biventricular pacing


5.  Depending on his progress further recommendations will be made.  Thank you 

for this consult we will follow with you.  





Past Medical History


Past Medical History: Hyperlipidemia, Hypertension, Thyroid Disorder


History of Any Multi-Drug Resistant Organisms: None Reported


Additional Past Surgical History / Comment(s): Thyroidectomy


Past Anesthesia/Blood Transfusion Reactions: No Reported Reaction


Past Psychological History: No Psychological Hx Reported


Smoking Status: Former smoker


Past Alcohol Use History: None Reported


Past Drug Use History: None Reported





- Past Family History


  ** Mother


Family Medical History: Dementia


Additional Family Medical History / Comment(s): alzheimers





  ** Father


Family Medical History: Congestive Heart Failure (CHF)





Medications and Allergies


                                Home Medications











 Medication  Instructions  Recorded  Confirmed  Type


 


Aspirin EC [Ecotrin Low Dose] 81 mg PO Q48H 11/16/21 03/25/22 History


 


Carboxymethylcellulose Sodium 1 drop BOTH EYES QID PRN 11/16/21 03/25/22 History





[Refresh Tears]    


 


Levothyroxine Sodium [Synthroid] 125 mcg PO DAILY 11/16/21 03/25/22 History


 


Sildenafil Citrate [Viagra] 100 mg PO DAILY PRN 11/16/21 03/25/22 History


 


Simvastatin [Zocor] 40 mg PO HS 11/16/21 03/25/22 History


 


lisinopriL [Prinivil] 20 mg PO DAILY 11/16/21 03/25/22 History


 


metFORMIN  mg PO BID 11/16/21 03/25/22 History


 


Acetaminophen Tab [Tylenol] 650 mg PO Q6HR PRN  tab 11/19/21 03/25/22 Rx


 


Furosemide [Lasix] 40 mg PO DAILY 30 Days #30 tab 11/19/21 03/25/22 Rx


 


Metoprolol Tartrate [Lopressor] 25 mg PO BID 30 Days #60 tab 11/19/21 03/25/22 

Rx








                                    Allergies











Allergy/AdvReac Type Severity Reaction Status Date / Time


 


No Known Allergies Allergy   Verified 03/25/22 06:47














Physical Exam


Vitals: 


                                   Vital Signs











  Temp Pulse Resp BP Pulse Ox


 


 03/25/22 06:50   70  18  157/78  94 L


 


 03/25/22 03:54   61  18  168/103  95


 


 03/25/22 02:18  97.6 F  75  24  186/63  96








                                Intake and Output











 03/24/22 03/25/22 03/25/22





 22:59 06:59 14:59


 


Other:   


 


  Weight  89.358 kg 














Results





                                 03/25/22 03:09





                                 03/25/22 03:10


                                 Cardiac Enzymes











  03/25/22 03/25/22 Range/Units





  03:09 03:10 


 


AST   26  (17-59)  U/L


 


Troponin I  0.062 H*   (0.000-0.034)  ng/mL








                                   Coagulation











  03/25/22 Range/Units





  03:09 


 


PT  11.2  (9.0-12.0)  sec


 


APTT  25.4  (22.0-30.0)  sec








                                       CBC











  03/25/22 Range/Units





  03:09 


 


WBC  5.9  (3.8-10.6)  k/uL


 


RBC  4.64  (4.30-5.90)  m/uL


 


Hgb  13.2  (13.0-17.5)  gm/dL


 


Hct  41.5  (39.0-53.0)  %


 


Plt Count  346  (150-450)  k/uL








                          Comprehensive Metabolic Panel











  03/25/22 Range/Units





  03:10 


 


Sodium  138  (137-145)  mmol/L


 


Potassium  3.7  (3.5-5.1)  mmol/L


 


Chloride  103  ()  mmol/L


 


Carbon Dioxide  24  (22-30)  mmol/L


 


BUN  27 H  (9-20)  mg/dL


 


Creatinine  1.07  (0.66-1.25)  mg/dL


 


Glucose  156 H  (74-99)  mg/dL


 


Calcium  7.9 L  (8.4-10.2)  mg/dL


 


AST  26  (17-59)  U/L


 


ALT  12  (4-49)  U/L


 


Alkaline Phosphatase  101  ()  U/L


 


Total Protein  7.8  (6.3-8.2)  g/dL


 


Albumin  3.7  (3.5-5.0)  g/dL








                               Current Medications











Generic Name Dose Route Start Last Admin





  Trade Name Freq  PRN Reason Stop Dose Admin


 


Acetaminophen  650 mg  03/25/22 07:32 





  Acetaminophen Tab 325 Mg Tab  PO  





  Q6HR PRN  





  Mild Pain or Fever > 100.5  


 


Artificial Tears  1 drops  03/25/22 07:32 





  Artificial Tears-Hypromellose Drops 15 Ml Btl  BOTH EYES  





  QID PRN  





  Dry Eye(s)  


 


Aspirin  81 mg  03/26/22 09:00 





  Aspirin 81 Mg  PO  





  Q48H [facility]  


 


Atorvastatin Calcium  20 mg  03/25/22 21:00 





  Atorvastatin 20 Mg Tab  PO  





  HS OPAL  


 


Famotidine  20 mg  03/25/22 09:00 





  Famotidine 20 Mg/2 Ml Vial  IV  





  Q12HR [facility]  


 


Furosemide  40 mg  03/25/22 18:00 





  Furosemide 10 Mg/Ml 4 Ml Vial  IV  





  Q12H [facility]  


 


Heparin Sodium (Porcine)  5,000 unit  03/25/22 09:00 





  Heparin Sodium,Porcine/Pf 5,000 Unit/0.5 Ml Syringe  SQ  





  Q12HR [facility]  


 


Levothyroxine Sodium  125 mcg  03/25/22 09:00 





  Levothyroxine 125 Mcg Tab  PO  





  DAILY@0630 [facility]  


 


Lisinopril  20 mg  03/25/22 09:00 





  Lisinopril 20 Mg Tab  PO  





  DAILY [facility]  


 


Metformin HCl  500 mg  03/25/22 09:00 





  Metformin 500 Mg Tab  PO  





  BID [facility]  


 


Metoprolol Tartrate  25 mg  03/25/22 09:00 





  Metoprolol Tartrate 25 Mg Tab  PO  





  BID [facility]  


 


Nitroglycerin  0.5 inch  03/25/22 09:00 





  Nitroglycerin Oint 1 Inch/Gm Packet  TOPICAL  





  QID [facility]  


 


Sodium Chloride  10 ml  03/25/22 09:00 





  Sodium Chloride 0.9% Flush 10 Ml Syringe  IV  





  BID OPAL  








                                Intake and Output











 03/24/22 03/25/22 03/25/22





 22:59 06:59 14:59


 


Other:   


 


  Weight  89.358 kg 








                                        





                                 03/25/22 03:09 





                                 03/25/22 03:10

## 2022-03-25 NOTE — XR
EXAMINATION TYPE: XR chest 2V

 

DATE OF EXAM: 3/25/2022

 

COMPARISON: 11/17/2021

 

HISTORY: Short of breath

 

TECHNIQUE:

 

FINDINGS: There is some pulmonary interstitial and airspace edema. There is fluid in the major fissur
es. There is mild blunting of the costophrenic angles. Heart size is normal. Bony thorax is intact.

 

IMPRESSION: Pulmonary edema and pleural fluid similar to last exam and could relate to chronic conges
tive heart failure. There is some improvement in the right upper lobe compared to last exam.

## 2022-03-25 NOTE — P.HPIM
History of Present Illness





this is a pleasant 83 yo Female with past medical history of hypertension , 

hyperlipidemia, and hypothyoridism . she follow up with the [facility] clinic 


this time she presents to the hospital with dyspnea and orthopnea, worsening ove

r the last few days


Associated with little dry cough.


Patient denies chest pain or epigastric pain.


No history of heart disease before, he saw a cardiologist before [place] where

he was in the hospital for pneumonia





Vitals are stable and she is saturating 94-96% on room air.


Labs including CBC, INR, BMP and liver enzymes are unremarkable.


Troponin is elevated at 0.06.  ProBNP is elevated at 10 300


Chest x-ray: Pulmonary edema and pleural fluid similar to last exam and could be

related to chronic congestive heart failure


In the emergency room patient was started on Lasix

















Review of Systems





CONSTITUTIONAL: No fever, no malaise, no fatigue. 


HEENT: No recent visual problems or hearing problems. Denied any sore throat. 


CARDIOVASCULAR: No  orthopnea, PND, no palpitations, no syncope. 


PULMONARY: No chest wall tenderness, no hemoptysis. 


GASTROINTESTINAL: No diarrhea, no nausea, no vomiting, no abdominal pain. 

Normoactive bowel sounds. 


NEUROLOGICAL: No headaches, no weakness, no numbness. 


HEMATOLOGICAL: Denies any bleeding or petechiae. 


GENITOURINARY: Denies any burning micturition, frequency, or urgency. 


MUSCULOSKELETAL/RHEUMATOLOGICAL: Denies any joint pain, swelling, or any muscle 

pain. 


ENDOCRINE: Denies any polyuria or polydipsia.











Past Medical History


Past Medical History: Hyperlipidemia, Hypertension, Thyroid Disorder


History of Any Multi-Drug Resistant Organisms: None Reported


Additional Past Surgical History / Comment(s): Thyroidectomy


Past Anesthesia/Blood Transfusion Reactions: No Reported Reaction


Past Psychological History: No Psychological Hx Reported


Smoking Status: Former smoker


Past Alcohol Use History: None Reported


Past Drug Use History: None Reported





- Past Family History


  ** Mother


Family Medical History: Dementia


Additional Family Medical History / Comment(s): alzheimers





  ** Father


Family Medical History: Congestive Heart Failure (CHF)





Medications and Allergies


                                Home Medications











 Medication  Instructions  Recorded  Confirmed  Type


 


Aspirin EC [Ecotrin Low Dose] 81 mg PO Q48H 11/16/21 03/25/22 History


 


Carboxymethylcellulose Sodium 1 drop BOTH EYES QID PRN 11/16/21 03/25/22 History





[Refresh Tears]    


 


Levothyroxine Sodium [Synthroid] 125 mcg PO DAILY 11/16/21 03/25/22 History


 


Sildenafil Citrate [Viagra] 100 mg PO DAILY PRN 11/16/21 03/25/22 History


 


Simvastatin [Zocor] 40 mg PO HS 11/16/21 03/25/22 History


 


lisinopriL [Prinivil] 20 mg PO DAILY 11/16/21 03/25/22 History


 


metFORMIN  mg PO BID 11/16/21 03/25/22 History


 


Acetaminophen Tab [Tylenol] 650 mg PO Q6HR PRN  tab 11/19/21 03/25/22 Rx


 


Furosemide [Lasix] 40 mg PO DAILY 30 Days #30 tab 11/19/21 03/25/22 Rx


 


Metoprolol Tartrate [Lopressor] 25 mg PO BID 30 Days #60 tab 11/19/21 03/25/22 

Rx








                                    Allergies











Allergy/AdvReac Type Severity Reaction Status Date / Time


 


No Known Allergies Allergy   Verified 03/25/22 06:47














Physical Exam


Vitals: 


                                   Vital Signs











  Temp Pulse Resp BP Pulse Ox


 


 03/25/22 06:50   70  18  157/78  94 L


 


 03/25/22 03:54   61  18  168/103  95


 


 03/25/22 02:18  97.6 F  75  24  186/63  96








                                Intake and Output











 03/24/22 03/25/22 03/25/22





 22:59 06:59 14:59


 


Other:   


 


  Weight  89.358 kg 














GENERAL: The patient is alert and oriented x3, not in any acute distress. Well 

developed, well nourished. 


HEENT: Pupils are round and equally reacting to light. EOMI. No scleral icterus.

 No conjunctival pallor. Normocephalic, atraumatic. No pharyngeal erythema. No 

thyromegaly. 


CARDIOVASCULAR: S1 and S2 present. No murmurs, rubs, or gallops. 


-PULMONARY: Chest is clear to auscultation, no wheezing.  Bilateral basal 

crepitation


ABDOMEN: Soft, nontender, nondistended, normoactive bowel sounds. No palpable 

organomegaly. 


MUSCULOSKELETAL: No joint swelling or deformity. 


-EXTREMITIES: No cyanosis, clubbing, mild bilateral pitting like edema


NEUROLOGICAL: Gross neurological examination did not reveal any focal deficits. 


SKIN: No rashes. No petechiae





Results


CBC & Chem 7: 


                                 03/25/22 03:09





                                 03/25/22 03:10


Labs: 


                  Abnormal Lab Results - Last 24 Hours (Table)











  03/25/22 03/25/22 Range/Units





  03:09 03:10 


 


BUN   27 H  (9-20)  mg/dL


 


Glucose   156 H  (74-99)  mg/dL


 


Calcium   7.9 L  (8.4-10.2)  mg/dL


 


Troponin I  0.062 H*   (0.000-0.034)  ng/mL














Assessment and Plan


Assessment: 





Acute CHF exacerbation


Elevated troponin


Hypertension


Hyperlipidemia


Hypothyroidism





Plan: 





This is a pleasant 82 years old female who presents with CHF and elevated 

troponin


Continue with IV Lasix


Cardiology consult


Continue with aspirin 81 mg


Monitor input and output.


Check TSH, hemoglobin A1c and procalcitonin


Labs and medication were reviewed..  Continue same treatment.  Continue with 

symptomatic treatment.  Resume home medication.  Monitor lytes and vitals.  DVT 

and GI prophylaxis.  Further recommendations depends on the clinical course of 

the patient


DVT prophylaxis: Subcutaneous heparin


GI Prophylaxis: Pepcid


PT/OT: Pending


Prognosis is guarded

## 2022-03-25 NOTE — ED
SOB HPI





- General


Chief Complaint: Shortness of Breath


Stated Complaint: SOB


Time Seen by Provider: 22 02:53


Source: patient


Mode of arrival: wheelchair


Limitations: no limitations





- History of Present Illness


Initial Comments: 





This patient is an 82-year-old man who presents with cough and shortness of 

breath is been going on for number days up to a week.  He states that the 

breathing is worse if he lies flat.  He had not noted any congestion.  No fever 

or chills.  Cough is nonproductive.  No chest pain.  He has not had leg pain or 

swelling.  No change in urination or bowel movements.


MD Complaint: shortness of breath, cough


Onset/Timin


-: week(s)


Consistency: constant


Improves With: nothing


Worsens With: lying flat


Associated Symptoms: cough


Treatments Prior to Arrival: none





- Related Data


                                Home Medications











 Medication  Instructions  Recorded  Confirmed


 


Aspirin EC [Ecotrin Low Dose] 81 mg PO Q48H 21


 


Carboxymethylcellulose Sodium 1 drop BOTH EYES QID PRN 21





[Refresh Tears]   


 


Levothyroxine Sodium [Synthroid] 125 mcg PO DAILY 21


 


Sildenafil Citrate [Viagra] 100 mg PO DAILY PRN 21


 


Simvastatin [Zocor] 40 mg PO HS 21


 


lisinopriL [Prinivil] 20 mg PO DAILY 21


 


metFORMIN  mg PO BID 21








                                  Previous Rx's











 Medication  Instructions  Recorded


 


Acetaminophen Tab [Tylenol] 650 mg PO Q6HR PRN  tab 21


 


Furosemide [Lasix] 40 mg PO DAILY 30 Days #30 tab 21


 


Metoprolol Tartrate [Lopressor] 25 mg PO BID 30 Days #60 tab 21











                                    Allergies











Allergy/AdvReac Type Severity Reaction Status Date / Time


 


No Known Allergies Allergy   Verified 22 06:47














Review of Systems


ROS Statement: 


Those systems with pertinent positive or pertinent negative responses have been 

documented in the HPI.





ROS Other: All systems not noted in ROS Statement are negative.


Constitutional: Denies: fever, chills


Respiratory: Reports: cough, dyspnea.  Denies: wheezes, hemoptysis


Cardiovascular: Reports: orthopnea.  Denies: chest pain, palpitations, edema, 

syncope


Gastrointestinal: Denies: abdominal pain, vomiting, diarrhea


Genitourinary: Denies: dysuria


Musculoskeletal: Denies: back pain


Neurological: Denies: headache, weakness





Past Medical History


Past Medical History: Hyperlipidemia, Hypertension, Thyroid Disorder


History of Any Multi-Drug Resistant Organisms: None Reported


Additional Past Surgical History / Comment(s): Thyroidectomy


Past Anesthesia/Blood Transfusion Reactions: No Reported Reaction


Past Psychological History: No Psychological Hx Reported


Smoking Status: Former smoker


Past Alcohol Use History: None Reported


Past Drug Use History: None Reported





- Past Family History


  ** Mother


Family Medical History: Dementia


Additional Family Medical History / Comment(s): alzheimers





  ** Father


Family Medical History: Congestive Heart Failure (CHF)





General Exam


Limitations: no limitations


General appearance: alert, in no apparent distress


Head exam: Present: atraumatic, normocephalic


Eye exam: Present: normal appearance


Neck exam: Present: normal inspection


Respiratory exam: Present: rales (Bilateral lower lung fields).  Absent: 

respiratory distress, wheezes, rhonchi, stridor, chest wall tenderness, 

accessory muscle use, decreased breath sounds


Cardiovascular Exam: Present: regular rate, normal rhythm, normal heart sounds. 

 Absent: systolic murmur, diastolic murmur, rubs, gallop


GI/Abdominal exam: Present: soft.  Absent: distended, tenderness, guarding, 

rebound, rigid, mass


Extremities exam: Present: normal inspection, normal capillary refill.  Absent: 

pedal edema, calf tenderness


Back exam: Present: normal inspection


Neurological exam: Present: alert


Skin exam: Present: warm, dry, intact, normal color.  Absent: rash





Course


                                   Vital Signs











  22





  02:18 03:54 06:50


 


Temperature 97.6 F  


 


Pulse Rate 75 61 70


 


Respiratory 24 18 18





Rate   


 


Blood Pressure 186/63 168/103 157/78


 


O2 Sat by Pulse 96 95 94 L





Oximetry   














Medical Decision Making





- Lab Data


Result diagrams: 


                                 22 03:09





                                 22 03:10


                                   Lab Results











  22 Range/Units





  03:09 03:09 03:09 


 


WBC  5.9    (3.8-10.6)  k/uL


 


RBC  4.64    (4.30-5.90)  m/uL


 


Hgb  13.2    (13.0-17.5)  gm/dL


 


Hct  41.5    (39.0-53.0)  %


 


MCV  89.5    (80.0-100.0)  fL


 


MCH  28.5    (25.0-35.0)  pg


 


MCHC  31.9    (31.0-37.0)  g/dL


 


RDW  15.1    (11.5-15.5)  %


 


Plt Count  346    (150-450)  k/uL


 


MPV  8.0    


 


Neutrophils %  46    %


 


Lymphocytes %  34    %


 


Monocytes %  11    %


 


Eosinophils %  5    %


 


Basophils %  2    %


 


Neutrophils #  2.7    (1.3-7.7)  k/uL


 


Lymphocytes #  2.0    (1.0-4.8)  k/uL


 


Monocytes #  0.6    (0-1.0)  k/uL


 


Eosinophils #  0.3    (0-0.7)  k/uL


 


Basophils #  0.1    (0-0.2)  k/uL


 


PT   11.2   (9.0-12.0)  sec


 


INR   1.0   (<1.2)  


 


APTT   25.4   (22.0-30.0)  sec


 


Sodium     (137-145)  mmol/L


 


Potassium     (3.5-5.1)  mmol/L


 


Chloride     ()  mmol/L


 


Carbon Dioxide     (22-30)  mmol/L


 


Anion Gap     mmol/L


 


BUN     (9-20)  mg/dL


 


Creatinine     (0.66-1.25)  mg/dL


 


Est GFR (CKD-EPI)AfAm     (>60 ml/min/1.73 sqM)  


 


Est GFR (CKD-EPI)NonAf     (>60 ml/min/1.73 sqM)  


 


Glucose     (74-99)  mg/dL


 


Plasma Lactic Acid Girma    1.6  (0.7-2.0)  mmol/L


 


Calcium     (8.4-10.2)  mg/dL


 


Total Bilirubin     (0.2-1.3)  mg/dL


 


AST     (17-59)  U/L


 


ALT     (4-49)  U/L


 


Alkaline Phosphatase     ()  U/L


 


Troponin I     (0.000-0.034)  ng/mL


 


NT-Pro-B Natriuret Pep     pg/mL


 


Total Protein     (6.3-8.2)  g/dL


 


Albumin     (3.5-5.0)  g/dL














  22/22 Range/Units





  03:09 03:10 03:43 


 


WBC     (3.8-10.6)  k/uL


 


RBC     (4.30-5.90)  m/uL


 


Hgb     (13.0-17.5)  gm/dL


 


Hct     (39.0-53.0)  %


 


MCV     (80.0-100.0)  fL


 


MCH     (25.0-35.0)  pg


 


MCHC     (31.0-37.0)  g/dL


 


RDW     (11.5-15.5)  %


 


Plt Count     (150-450)  k/uL


 


MPV     


 


Neutrophils %     %


 


Lymphocytes %     %


 


Monocytes %     %


 


Eosinophils %     %


 


Basophils %     %


 


Neutrophils #     (1.3-7.7)  k/uL


 


Lymphocytes #     (1.0-4.8)  k/uL


 


Monocytes #     (0-1.0)  k/uL


 


Eosinophils #     (0-0.7)  k/uL


 


Basophils #     (0-0.2)  k/uL


 


PT     (9.0-12.0)  sec


 


INR     (<1.2)  


 


APTT     (22.0-30.0)  sec


 


Sodium   138   (137-145)  mmol/L


 


Potassium   3.7   (3.5-5.1)  mmol/L


 


Chloride   103   ()  mmol/L


 


Carbon Dioxide   24   (22-30)  mmol/L


 


Anion Gap   11   mmol/L


 


BUN   27 H   (9-20)  mg/dL


 


Creatinine   1.07   (0.66-1.25)  mg/dL


 


Est GFR (CKD-EPI)AfAm   75   (>60 ml/min/1.73 sqM)  


 


Est GFR (CKD-EPI)NonAf   65   (>60 ml/min/1.73 sqM)  


 


Glucose   156 H   (74-99)  mg/dL


 


Plasma Lactic Acid Girma     (0.7-2.0)  mmol/L


 


Calcium   7.9 L   (8.4-10.2)  mg/dL


 


Total Bilirubin   0.8   (0.2-1.3)  mg/dL


 


AST   26   (17-59)  U/L


 


ALT   12   (4-49)  U/L


 


Alkaline Phosphatase   101   ()  U/L


 


Troponin I  0.062 H*    (0.000-0.034)  ng/mL


 


NT-Pro-B Natriuret Pep    60292  pg/mL


 


Total Protein   7.8   (6.3-8.2)  g/dL


 


Albumin   3.7   (3.5-5.0)  g/dL














Disposition


Clinical Impression: 


 Congestive heart failure





Disposition: ADMITTED AS IP TO THIS HOSP


Condition: Good


Is patient prescribed a controlled substance at d/c from ED?: No

## 2022-03-26 LAB
ANION GAP SERPL CALC-SCNC: 9 MMOL/L
BUN SERPL-SCNC: 30 MG/DL (ref 9–20)
CALCIUM SPEC-MCNC: 7.7 MG/DL (ref 8.4–10.2)
CHLORIDE SERPL-SCNC: 98 MMOL/L (ref 98–107)
CO2 SERPL-SCNC: 29 MMOL/L (ref 22–30)
GLUCOSE BLD-MCNC: 105 MG/DL (ref 75–99)
GLUCOSE BLD-MCNC: 132 MG/DL (ref 75–99)
GLUCOSE BLD-MCNC: 132 MG/DL (ref 75–99)
GLUCOSE BLD-MCNC: 140 MG/DL (ref 75–99)
GLUCOSE SERPL-MCNC: 288 MG/DL (ref 74–99)
MAGNESIUM SPEC-SCNC: 1.7 MG/DL (ref 1.6–2.3)
POTASSIUM SERPL-SCNC: 3.5 MMOL/L (ref 3.5–5.1)
SODIUM SERPL-SCNC: 136 MMOL/L (ref 137–145)

## 2022-03-26 RX ADMIN — METOPROLOL TARTRATE SCH MG: 50 TABLET, FILM COATED ORAL at 20:45

## 2022-03-26 RX ADMIN — HEPARIN SODIUM SCH UNIT: 5000 INJECTION INTRAVENOUS; SUBCUTANEOUS at 20:47

## 2022-03-26 RX ADMIN — FUROSEMIDE SCH MG: 40 TABLET ORAL at 16:55

## 2022-03-26 RX ADMIN — ATORVASTATIN CALCIUM SCH MG: 20 TABLET, FILM COATED ORAL at 20:45

## 2022-03-26 RX ADMIN — SPIRONOLACTONE SCH MG: 25 TABLET, FILM COATED ORAL at 08:24

## 2022-03-26 RX ADMIN — FAMOTIDINE SCH MG: 10 INJECTION, SOLUTION INTRAVENOUS at 08:24

## 2022-03-26 RX ADMIN — FUROSEMIDE SCH MG: 10 INJECTION, SOLUTION INTRAMUSCULAR; INTRAVENOUS at 06:33

## 2022-03-26 RX ADMIN — LEVOTHYROXINE SODIUM SCH MCG: 0.12 TABLET ORAL at 06:33

## 2022-03-26 RX ADMIN — HEPARIN SODIUM SCH UNIT: 5000 INJECTION INTRAVENOUS; SUBCUTANEOUS at 08:24

## 2022-03-26 RX ADMIN — SODIUM CHLORIDE, PRESERVATIVE FREE SCH ML: 5 INJECTION INTRAVENOUS at 20:46

## 2022-03-26 RX ADMIN — LINAGLIPTIN SCH MG: 5 TABLET, FILM COATED ORAL at 20:46

## 2022-03-26 RX ADMIN — ASPIRIN 81 MG CHEWABLE TABLET SCH MG: 81 TABLET CHEWABLE at 08:24

## 2022-03-26 RX ADMIN — SODIUM CHLORIDE, PRESERVATIVE FREE SCH: 5 INJECTION INTRAVENOUS at 08:24

## 2022-03-26 RX ADMIN — METOPROLOL TARTRATE SCH MG: 25 TABLET, FILM COATED ORAL at 08:24

## 2022-03-26 NOTE — P.PN
Subjective





this is a pleasant 83 yo Female with past medical history of hypertension , 

hyperlipidemia, and hypothyoridism . she follow up with the [facility] clinic 


this time she presents to the hospital with dyspnea and orthopnea, worsening 

over the last few days


Associated with little dry cough.


Patient denies chest pain or epigastric pain.


No history of heart disease before, he saw a cardiologist before [place] where

he was in the hospital for pneumonia





Vitals are stable and she is saturating 94-96% on room air.


Labs including CBC, INR, BMP and liver enzymes are unremarkable.


Troponin is elevated at 0.06.  ProBNP is elevated at 10 300


Chest x-ray: Pulmonary edema and pleural fluid similar to last exam and could be

related to chronic congestive heart failure


In the emergency room patient was started on Lasix








03/26/2022


Patient only with mild exertional dyspnea, he is able to walk freely by himself 

back and forth to the restroom and in his room with no difficulty.  He denies 

any chest pain and he denies any other symptoms other than exertional dyspnea 

which looks improving and mild now.


His creatinine increased 1.0 (21.3, most likely secondary to diuretics, his IV 

Lasix 40 mg twice a day switched to by mouth 20 daily


Check creatinine tomorrow


Cardiologist team are considering cardiac cath on Monday, if kidney function 

does not improve then this could be postponed outpatient.





Objective





- Vital Signs


Vital signs: 


                                   Vital Signs











Temp  97.9 F   03/26/22 11:52


 


Pulse  62   03/26/22 11:52


 


Resp  18   03/26/22 11:52


 


BP  143/74   03/26/22 11:52


 


Pulse Ox  95   03/26/22 11:52








                                 Intake & Output











 03/25/22 03/26/22 03/26/22





 18:59 06:59 18:59


 


Intake Total 240  240


 


Output Total 575 500 150


 


Balance -335 -500 90


 


Weight 89.358 kg 90.2 kg 


 


Intake:   


 


  Oral 240  240


 


Output:   


 


  Urine 575 500 150


 


Other:   


 


  # Bowel Movements 2  














- Exam





GENERAL: The patient is alert and oriented x3, not in any acute distress. Well 

developed, well nourished. 


HEENT: Pupils are round and equally reacting to light. EOMI. No scleral icterus.

No conjunctival pallor. Normocephalic, atraumatic. No pharyngeal erythema. No 

thyromegaly. 


CARDIOVASCULAR: S1 and S2 present. No murmurs, rubs, or gallops. 


PULMONARY: Chest is clear to auscultation, no wheezing or crackles. 


ABDOMEN: Soft, nontender, nondistended, normoactive bowel sounds. No palpable 

organomegaly. 


MUSCULOSKELETAL: No joint swelling or deformity. 


EXTREMITIES: No cyanosis, clubbing, or pedal edema. 


NEUROLOGICAL: Gross neurological examination did not reveal any focal deficits. 


SKIN: No rashes. no petechiae.





- Labs


CBC & Chem 7: 


                                 03/25/22 03:09





                                 03/26/22 09:29


Labs: 


                  Abnormal Lab Results - Last 24 Hours (Table)











  03/25/22 03/25/22 03/25/22 Range/Units





  08:09 12:15 20:27 


 


Sodium     (137-145)  mmol/L


 


BUN     (9-20)  mg/dL


 


Creatinine     (0.66-1.25)  mg/dL


 


Glucose     (74-99)  mg/dL


 


POC Glucose (mg/dL)    143 H  (75-99)  mg/dL


 


Hemoglobin A1c  7.5 H    (0.0-6.0)  %


 


Calcium     (8.4-10.2)  mg/dL


 


Troponin I   0.050 H*   (0.000-0.034)  ng/mL














  03/26/22 03/26/22 03/26/22 Range/Units





  06:11 09:29 11:42 


 


Sodium   136 L   (137-145)  mmol/L


 


BUN   30 H   (9-20)  mg/dL


 


Creatinine   1.38 H   (0.66-1.25)  mg/dL


 


Glucose   288 H   (74-99)  mg/dL


 


POC Glucose (mg/dL)  132 H   105 H  (75-99)  mg/dL


 


Hemoglobin A1c     (0.0-6.0)  %


 


Calcium   7.7 L   (8.4-10.2)  mg/dL


 


Troponin I     (0.000-0.034)  ng/mL














Assessment and Plan


Assessment: 





Acute CHF exacerbation


Elevated troponin


Diabetes mellitus, with uncontrolled glucose and in the globe an A1c 7.5%


Hypertension


Hyperlipidemia


Hypothyroidism





Plan: 





This is a pleasant 82 years old female who presents with CHF and elevated 

troponin


Change IV Lasix and to oral dose 20 mg daily 


Cardiology consult


Continue with aspirin 81 mg


Cardiologist team are considering cardiac cath on Monday, if kidney function 

does not improve then this could be postponed outpatient.


Hold metformin until creatinine improved.  At the neck lifting anyway because of

uncontrolled diabetes and A1c of 7.4%.  Continue with ISS


Labs and medication were reviewed..  Continue same treatment.  Continue with 

symptomatic treatment.  Resume home medication.  Monitor lytes and vitals.  DVT 

and GI prophylaxis.  Further recommendations depends on the clinical course of 

the patient


DVT prophylaxis: Subcutaneous heparin


GI Prophylaxis: Pepcid


PT/OT: Pending


Prognosis is guarded

## 2022-03-26 NOTE — P.PN
Subjective


Progress Note Date: 03/26/22





PROGRESS NOTE


The patient is a 82-year-old male who presents with progressive dyspnea.  He had

an abnormal MPI recently and was scheduled to be followed by Dr. Salazar for 

possible coronary angiography.  He is feeling better today.  His breathing is 

better.  He denies any chest discomfort, dizziness or palpitations.  He denies 

any PND or orthopnea.  He continues to be on aspirin once a day, Lasix 40 mg IV 

every 12 hours, lisinopril 20 mg twice a day, Glucophage 500 mg twice a day, 

metoprolol 25 mg 3 times a day, spironolactone 25 mg daily


PHYSICAL EXAMINATION:


Blood pressure 148/60 heart rate 63


LUNGS: Clear to auscultation


HEART: Regular rate and rhythm, S1, S2. No S3.  Systolic murmur at the apex


ABDOMEN: Soft, nontender, no organomegaly


EXTREMETIES: No edema





LAB:


Potassium 3.5, creatinine 1.38, NT proBNP 7740.  Improved


IMPRESSION:


1.  Congestive heart failure with impaired systolic function


2.  Worsening renal function


3.  Abnormal MPI


4.  History of hypertension





PLAN:


1.  Changed to oral diuretics


2.  Follow renal functions


3.  If stable proceed with cardiac catheterization on Monday by Dr. Salazar, if 

renal functions have not improved then consider cardiac catheterization as an 

outpatient.





 








Objective





- Vital Signs


Vital signs: 


                                   Vital Signs











Temp  98.1 F   03/26/22 15:34


 


Pulse  63   03/26/22 15:34


 


Resp  18   03/26/22 15:34


 


BP  148/67   03/26/22 15:34


 


Pulse Ox  96   03/26/22 15:34








                                 Intake & Output











 03/25/22 03/26/22 03/26/22





 18:59 06:59 18:59


 


Intake Total 240  480


 


Output Total 575 500 150


 


Balance -335 -500 330


 


Weight 89.358 kg 90.2 kg 


 


Intake:   


 


  Oral 240  480


 


Output:   


 


  Urine 575 500 150


 


Other:   


 


  # Bowel Movements 2  














- Labs


CBC & Chem 7: 


                                 03/25/22 03:09





                                 03/26/22 09:29


Labs: 


                  Abnormal Lab Results - Last 24 Hours (Table)











  03/25/22 03/26/22 03/26/22 Range/Units





  20:27 06:11 09:29 


 


Sodium    136 L  (137-145)  mmol/L


 


BUN    30 H  (9-20)  mg/dL


 


Creatinine    1.38 H  (0.66-1.25)  mg/dL


 


Glucose    288 H  (74-99)  mg/dL


 


POC Glucose (mg/dL)  143 H  132 H   (75-99)  mg/dL


 


Calcium    7.7 L  (8.4-10.2)  mg/dL














  03/26/22 Range/Units





  11:42 


 


Sodium   (137-145)  mmol/L


 


BUN   (9-20)  mg/dL


 


Creatinine   (0.66-1.25)  mg/dL


 


Glucose   (74-99)  mg/dL


 


POC Glucose (mg/dL)  105 H  (75-99)  mg/dL


 


Calcium   (8.4-10.2)  mg/dL

## 2022-03-27 VITALS — RESPIRATION RATE: 18 BRPM

## 2022-03-27 LAB
ANION GAP SERPL CALC-SCNC: 5 MMOL/L
BUN SERPL-SCNC: 32 MG/DL (ref 9–20)
CALCIUM SPEC-MCNC: 8 MG/DL (ref 8.4–10.2)
CHLORIDE SERPL-SCNC: 100 MMOL/L (ref 98–107)
CO2 SERPL-SCNC: 32 MMOL/L (ref 22–30)
GLUCOSE BLD-MCNC: 115 MG/DL (ref 75–99)
GLUCOSE BLD-MCNC: 124 MG/DL (ref 75–99)
GLUCOSE BLD-MCNC: 137 MG/DL (ref 75–99)
GLUCOSE BLD-MCNC: 154 MG/DL (ref 75–99)
GLUCOSE SERPL-MCNC: 140 MG/DL (ref 74–99)
PH UR: 5 [PH] (ref 5–8)
POTASSIUM SERPL-SCNC: 3.8 MMOL/L (ref 3.5–5.1)
PROT UR QL: (no result)
PROT/CREAT UR-RTO: 0.3
RBC UR QL: <1 /HPF (ref 0–5)
SODIUM SERPL-SCNC: 137 MMOL/L (ref 137–145)
SP GR UR: 1.02 (ref 1–1.03)
SQUAMOUS UR QL AUTO: <1 /HPF (ref 0–4)
UROBILINOGEN UR QL STRIP: <2 MG/DL (ref ?–2)
WBC #/AREA URNS HPF: 1 /HPF (ref 0–5)

## 2022-03-27 RX ADMIN — HEPARIN SODIUM SCH UNIT: 5000 INJECTION INTRAVENOUS; SUBCUTANEOUS at 20:23

## 2022-03-27 RX ADMIN — SODIUM CHLORIDE, PRESERVATIVE FREE SCH ML: 5 INJECTION INTRAVENOUS at 20:25

## 2022-03-27 RX ADMIN — METOPROLOL TARTRATE SCH MG: 50 TABLET, FILM COATED ORAL at 20:23

## 2022-03-27 RX ADMIN — FUROSEMIDE SCH MG: 40 TABLET ORAL at 08:40

## 2022-03-27 RX ADMIN — ATORVASTATIN CALCIUM SCH MG: 20 TABLET, FILM COATED ORAL at 20:23

## 2022-03-27 RX ADMIN — SODIUM CHLORIDE, PRESERVATIVE FREE SCH: 5 INJECTION INTRAVENOUS at 07:46

## 2022-03-27 RX ADMIN — SPIRONOLACTONE SCH MG: 25 TABLET, FILM COATED ORAL at 08:40

## 2022-03-27 RX ADMIN — FAMOTIDINE SCH MG: 20 TABLET, FILM COATED ORAL at 08:40

## 2022-03-27 RX ADMIN — HEPARIN SODIUM SCH: 5000 INJECTION INTRAVENOUS; SUBCUTANEOUS at 07:46

## 2022-03-27 RX ADMIN — LINAGLIPTIN SCH MG: 5 TABLET, FILM COATED ORAL at 08:40

## 2022-03-27 RX ADMIN — METOPROLOL TARTRATE SCH MG: 50 TABLET, FILM COATED ORAL at 08:40

## 2022-03-27 RX ADMIN — LEVOTHYROXINE SODIUM SCH MCG: 0.12 TABLET ORAL at 06:24

## 2022-03-27 NOTE — P.NPCON
History of Present Illness





- Reason for Consult


Consult date: 03/27/22


acute renal failure





- Chief Complaint


Acute kidney injury





- History of Present Illness





This is a 82-year-old male seen in consultation because of acute kidney injury 

that occurred after he was hospitalized.  His creatinine was 1.07 on admission 

has gone up to 1.47 likely related to diuresis.  Is on Lasix 40 by mouth twice a

day, his lisinopril was also increased from 20 daily to 20 twice a day





He came in because of shortness of breath and a dry cough.  Chest x-ray is 

suggestive congestive heart failure.  He was diuresed 107 5 mL for today and 150

mL today  last 24 hours.





Patient is feeling much better.  Denies any dizziness on standing up.  Good 

appetite no chest pain no nausea vomiting diarrhea.








Blood pressures are 137/65 heart rate in the 50s to 60s is afebrile





Patient denies taking any nonsteroidals, no history suggestive of prostatism








Past Medical History


Past Medical History: Hyperlipidemia, Hypertension, Thyroid Disorder


History of Any Multi-Drug Resistant Organisms: None Reported


Additional Past Surgical History / Comment(s): Thyroidectomy


Past Anesthesia/Blood Transfusion Reactions: No Reported Reaction


Past Psychological History: No Psychological Hx Reported


Smoking Status: Former smoker


Past Alcohol Use History: None Reported


Past Drug Use History: None Reported





- Past Family History


  ** Mother


Family Medical History: Dementia


Additional Family Medical History / Comment(s): alzheimers





  ** Father


Family Medical History: Congestive Heart Failure (CHF)





Medications and Allergies


                                Home Medications











 Medication  Instructions  Recorded  Confirmed  Type


 


Aspirin EC [Ecotrin Low Dose] 81 mg PO Q48H 11/16/21 03/25/22 History


 


Carboxymethylcellulose Sodium 1 drop BOTH EYES QID PRN 11/16/21 03/25/22 History





[Refresh Tears]    


 


Levothyroxine Sodium [Synthroid] 125 mcg PO DAILY 11/16/21 03/25/22 History


 


Sildenafil Citrate [Viagra] 100 mg PO DAILY PRN 11/16/21 03/25/22 History


 


Simvastatin [Zocor] 40 mg PO HS 11/16/21 03/25/22 History


 


lisinopriL [Prinivil] 20 mg PO DAILY 11/16/21 03/25/22 History


 


metFORMIN  mg PO BID 11/16/21 03/25/22 History


 


Acetaminophen Tab [Tylenol] 650 mg PO Q6HR PRN  tab 11/19/21 03/25/22 Rx


 


Furosemide [Lasix] 40 mg PO DAILY 30 Days #30 tab 11/19/21 03/25/22 Rx


 


Metoprolol Tartrate [Lopressor] 25 mg PO BID 30 Days #60 tab 11/19/21 03/25/22 

Rx








                                    Allergies











Allergy/AdvReac Type Severity Reaction Status Date / Time


 


No Known Allergies Allergy   Verified 03/25/22 06:47














Physical Exam


Vitals: 


                                   Vital Signs











  Temp Pulse Resp BP Pulse Ox


 


 03/27/22 11:17   63   


 


 03/27/22 08:00  98 F  63  18  137/65  97


 


 03/27/22 07:38   58 L  18  


 


 03/27/22 04:00   58 L  18  152/81  95


 


 03/26/22 23:44  97.9 F  54 L  16  150/75  96


 


 03/26/22 19:42  98.5 F  63  18  147/70  96


 


 03/26/22 15:34  98.1 F  63  18  148/67  96


 


 03/26/22 11:52  97.9 F  62  18  143/74  95


 


 03/26/22 11:47   68  18  








                                Intake and Output











 03/26/22 03/27/22 03/27/22





 22:59 06:59 14:59


 


Intake Total 240  180


 


Balance 240  180


 


Intake:   


 


  Oral 240  180


 


Other:   


 


  # Voids 2 6 


 


  Weight  89.7 kg 








Examination he is sitting out in a chair comfortable on room air





HEENT exam no JVP neck is supple no facial asymmetry





Lungs are clear to auscultation good air entry bilaterally





Heart sounds unremarkable for any murmur rub gallop





Abdomen is soft nontender





Extremity exam was trace edema





Neurologically awake alert oriented





Results





- Lab Results


                             Most recent lab results











Calcium  8.0 mg/dL (8.4-10.2)  L  03/27/22  07:15    


 


Magnesium  1.7 mg/dL (1.6-2.3)   03/26/22  09:29    














                                 03/25/22 03:09





                                 03/27/22 07:15





Assessment and Plan


Plan: 





Impression





1.  Acute kidney injury likely secondary to diuresis.





2.  Chronic kidney disease stage III with GFR of 53 mL per minute a creatinine 

of on 11/18/2021 but on admission his GFR was 65 and per minute and creatinine 

was 1.07 etiology is nephrosclerosis.  Rule out diabetic nephropathy urinalysis 

is not available





3.  Congestive heart failure improved





4.  Pulmonary hypertension with severe tricuspid regurg with a ventricle 

pressure of 56 mm by echocardiogram dated 11/16/2021





5.  Cardiomyopathy ejection fraction is 45%.





Recommendation





1.  Hold Lasix for 1 day





2.  Maintain spironolactone 25 daily





3.  Check orthostatic changes





4.  Check postvoid residual





5.  Check urinalysis and urine protein to creatinine ratio.





Thank you for this consultation we will follow closely follow closely

## 2022-03-27 NOTE — P.PN
Subjective





this is a pleasant 81 yo Female with past medical history of hypertension , 

hyperlipidemia, and hypothyoridism . she follow up with the [facility] clinic 


this time she presents to the hospital with dyspnea and orthopnea, worsening 

over the last few days


Associated with little dry cough.


Patient denies chest pain or epigastric pain.


No history of heart disease before, he saw a cardiologist before [place] where

he was in the hospital for pneumonia





Vitals are stable and she is saturating 94-96% on room air.


Labs including CBC, INR, BMP and liver enzymes are unremarkable.


Troponin is elevated at 0.06.  ProBNP is elevated at 10 300


Chest x-ray: Pulmonary edema and pleural fluid similar to last exam and could be

related to chronic congestive heart failure


In the emergency room patient was started on Lasix








03/26/2022


Patient only with mild exertional dyspnea, he is able to walk freely by himself 

back and forth to the restroom and in his room with no difficulty.  He denies 

any chest pain and he denies any other symptoms other than exertional dyspnea 

which looks improving and mild now.


His creatinine increased 1.0 (21.3, most likely secondary to diuretics, his IV 

Lasix 40 mg twice a day switched to by mouth 20 daily


Check creatinine tomorrow


Cardiologist team are considering cardiac cath on Monday, if kidney function 

does not improve then this could be postponed outpatient.





03/27/2022


Patient's exertional dyspnea significantly improvement but does not completely 

resolved, no other symptoms.  Patient looks board and wants to go home, 

explained to him the need to monitor his kidney function and need to be 

reevaluated for possible cardiac cath tomorrow and he agrees to stay.


Creatinine is 1.39, urinalysis is negative, nephrologist recommendation is 

appreciated, hold Lasix today and resume tomorrow at 20 mg daily.


Metformin is on hold secondary to high creatinine, started on the medical team 

and was controlled.  Informed patient needed to better control his diabetes, 

hemoglobin A1c 7.5% and patient informed and he agrees.


Cardiology will evaluate the patient tomorrow for possible inpatient versus 

outpatient cardiac cath





Objective





- Vital Signs


Vital signs: 


                                   Vital Signs











Temp  98 F   03/27/22 08:00


 


Pulse  63   03/27/22 11:17


 


Resp  18   03/27/22 08:00


 


BP  137/65   03/27/22 08:00


 


Pulse Ox  97   03/27/22 08:00








                                 Intake & Output











 03/26/22 03/27/22 03/27/22





 18:59 06:59 18:59


 


Intake Total 720  180


 


Output Total 150  


 


Balance 570  180


 


Weight  89.7 kg 


 


Intake:   


 


  Oral 720  180


 


Output:   


 


  Urine 150  


 


Other:   


 


  # Voids 2 6 














- Exam





GENERAL: The patient is alert and oriented x3, not in any acute distress. Well 

developed, well nourished. 


HEENT: Pupils are round and equally reacting to light. EOMI. No scleral icterus.

No conjunctival pallor. Normocephalic, atraumatic. No pharyngeal erythema. No 

thyromegaly. 


CARDIOVASCULAR: S1 and S2 present. No murmurs, rubs, or gallops. 


PULMONARY: Chest is clear to auscultation, no wheezing or crackles. 


ABDOMEN: Soft, nontender, nondistended, normoactive bowel sounds. No palpable 

organomegaly. 


MUSCULOSKELETAL: No joint swelling or deformity. 


EXTREMITIES: No cyanosis, clubbing, or pedal edema. 


NEUROLOGICAL: Gross neurological examination did not reveal any focal deficits. 


SKIN: No rashes. no petechiae.





- Labs


CBC & Chem 7: 


                                 03/25/22 03:09





                                 03/27/22 07:15


Labs: 


                  Abnormal Lab Results - Last 24 Hours (Table)











  03/26/22 03/26/22 03/27/22 Range/Units





  16:30 20:10 06:39 


 


Carbon Dioxide     (22-30)  mmol/L


 


BUN     (9-20)  mg/dL


 


Creatinine     (0.66-1.25)  mg/dL


 


Glucose     (74-99)  mg/dL


 


POC Glucose (mg/dL)  132 H  140 H  137 H  (75-99)  mg/dL


 


Calcium     (8.4-10.2)  mg/dL














  03/27/22 03/27/22 Range/Units





  07:15 11:34 


 


Carbon Dioxide  32 H   (22-30)  mmol/L


 


BUN  32 H   (9-20)  mg/dL


 


Creatinine  1.47 H   (0.66-1.25)  mg/dL


 


Glucose  140 H   (74-99)  mg/dL


 


POC Glucose (mg/dL)   115 H  (75-99)  mg/dL


 


Calcium  8.0 L   (8.4-10.2)  mg/dL














Assessment and Plan


Assessment: 





Acute CHF exacerbation


Elevated troponin


Diabetes mellitus, with uncontrolled glucose and in the globe an A1c 7.5%


Hypertension


Hyperlipidemia


Hypothyroidism





Plan: 





This is a pleasant 82 years old female who presents with CHF and elevated 

troponin


Change IV Lasix and to oral dose 20 mg daily 


Cardiology consult


Continue with aspirin 81 mg


Cardiologist team are considering cardiac cath on Monday, if kidney function 

does not improve then this could be postponed outpatient.


Hold metformin until creatinine improved.  At the neck lifting anyway because of

uncontrolled diabetes and A1c of 7.4%.  Continue with ISS


Labs and medication were reviewed..  Continue same treatment.  Continue with 

symptomatic treatment.  Resume home medication.  Monitor lytes and vitals.  DVT 

and GI prophylaxis.  Further recommendations depends on the clinical course of 

the patient


DVT prophylaxis: Subcutaneous heparin


GI Prophylaxis: Pepcid


PT/OT: Pending


Prognosis is guarded

## 2022-03-27 NOTE — P.PN
Subjective


The patient is a pleasant 82-year-old male who presents with progressive 

dyspnea.  He had an abnormal MPI recently and was scheduled to be followed by 

Dr. Salazar for possible coronary angiography.  He is feeling better today.  His 

breathing is better.  He denies any chest discomfort, dizziness or palpitations.

 He denies any PND or orthopnea. His renal function is worse.  He continues to 

be on aspirin once a day, lisinopril 20 mg twice a day, Glucophage 500 mg twice 

a day, metoprolol 25 mg 3 times a day, spironolactone 25 mg daily. He has been 

seen and evaluated by nephrology and his Lasix has been decreased. 








Objective





- Vital Signs


Vital signs: 


                                   Vital Signs











Temp  98 F   03/27/22 12:00


 


Pulse  59 L  03/27/22 12:00


 


Resp  18   03/27/22 12:00


 


BP  141/76   03/27/22 12:00


 


Pulse Ox  98   03/27/22 12:00








                                 Intake & Output











 03/26/22 03/27/22 03/27/22





 18:59 06:59 18:59


 


Intake Total 720  180


 


Output Total 150  


 


Balance 570  180


 


Weight  89.7 kg 


 


Intake:   


 


  Oral 720  180


 


Output:   


 


  Urine 150  


 


Other:   


 


  # Voids 2 6 














- Exam


PHYSICAL EXAMINATION: 





HEENT: Head is atraumatic, normocephalic.  Pupils equal, round.  Neck is supple.

 There is no elevated jugular venous pressure.





HEART EXAMINATION: Heart sounds regular, S1 and S2 normal.  A systolic murmur at

the apex.





CHEST EXAMINATION: Lungs are clear to auscultation. No chest wall tenderness is 

noted on palpation or with deep breathing.





ABDOMEN:  Soft, nontender. Bowel sounds are heard. No organomegaly noted.


 


EXTREMITIES: No evidence of peripheral edema and no calf tenderness noted.





NEUROLOGIC patient is awake, alert and oriented x3.


 


.


 











- Labs


CBC & Chem 7: 


                                 03/25/22 03:09





                                 03/27/22 07:15


Labs: 


                  Abnormal Lab Results - Last 24 Hours (Table)











  03/26/22 03/26/22 03/27/22 Range/Units





  16:30 20:10 06:39 


 


Carbon Dioxide     (22-30)  mmol/L


 


BUN     (9-20)  mg/dL


 


Creatinine     (0.66-1.25)  mg/dL


 


Glucose     (74-99)  mg/dL


 


POC Glucose (mg/dL)  132 H  140 H  137 H  (75-99)  mg/dL


 


Calcium     (8.4-10.2)  mg/dL














  03/27/22 03/27/22 Range/Units





  07:15 11:34 


 


Carbon Dioxide  32 H   (22-30)  mmol/L


 


BUN  32 H   (9-20)  mg/dL


 


Creatinine  1.47 H   (0.66-1.25)  mg/dL


 


Glucose  140 H   (74-99)  mg/dL


 


POC Glucose (mg/dL)   115 H  (75-99)  mg/dL


 


Calcium  8.0 L   (8.4-10.2)  mg/dL














Assessment and Plan


Assessment: 


1.  Congestive heart failure with impaired systolic function


2.  Worsening renal function


3.  Abnormal MPI


4.  History of hypertension





Plan: 


From cardiology's perspective continue to follow nephrology is recommendations. 

Continue to follow renal function closely.  Depending on the patient's status 

and renal function may consider cardiac catheterization on Monday by Dr. MARKO Salazar.  Will be reevaluated in the morning.





NP note has been reviewed, I agree with a documented findings and plan of care. 

Patient was seen and examined.

## 2022-03-28 VITALS — SYSTOLIC BLOOD PRESSURE: 157 MMHG | HEART RATE: 54 BPM | TEMPERATURE: 97.8 F | DIASTOLIC BLOOD PRESSURE: 71 MMHG

## 2022-03-28 LAB
ANION GAP SERPL CALC-SCNC: 6 MMOL/L
BUN SERPL-SCNC: 34 MG/DL (ref 9–20)
CALCIUM SPEC-MCNC: 7.5 MG/DL (ref 8.4–10.2)
CHLORIDE SERPL-SCNC: 100 MMOL/L (ref 98–107)
CO2 SERPL-SCNC: 30 MMOL/L (ref 22–30)
GLUCOSE BLD-MCNC: 146 MG/DL (ref 75–99)
GLUCOSE SERPL-MCNC: 144 MG/DL (ref 74–99)
MAGNESIUM SPEC-SCNC: 1.8 MG/DL (ref 1.6–2.3)
POTASSIUM SERPL-SCNC: 3.9 MMOL/L (ref 3.5–5.1)
SODIUM SERPL-SCNC: 136 MMOL/L (ref 137–145)

## 2022-03-28 RX ADMIN — SPIRONOLACTONE SCH MG: 25 TABLET, FILM COATED ORAL at 08:26

## 2022-03-28 RX ADMIN — FAMOTIDINE SCH MG: 20 TABLET, FILM COATED ORAL at 08:27

## 2022-03-28 RX ADMIN — LINAGLIPTIN SCH: 5 TABLET, FILM COATED ORAL at 08:27

## 2022-03-28 RX ADMIN — SODIUM CHLORIDE, PRESERVATIVE FREE SCH: 5 INJECTION INTRAVENOUS at 08:28

## 2022-03-28 RX ADMIN — ASPIRIN 81 MG CHEWABLE TABLET SCH MG: 81 TABLET CHEWABLE at 08:26

## 2022-03-28 RX ADMIN — LEVOTHYROXINE SODIUM SCH MCG: 0.12 TABLET ORAL at 06:06

## 2022-03-28 RX ADMIN — HEPARIN SODIUM SCH: 5000 INJECTION INTRAVENOUS; SUBCUTANEOUS at 08:27

## 2022-03-28 RX ADMIN — METOPROLOL TARTRATE SCH MG: 50 TABLET, FILM COATED ORAL at 08:27

## 2022-03-28 NOTE — P.PN
Subjective


HISTORY OF PRESENTING ILLNESS


The patient is a pleasant 82-year-old male who presents with progressive 

dyspnea.  He had an abnormal MPI recently and was scheduled to be followed by 

Dr. Salazar for possible coronary angiography.  He is feeling better today.  His 

breathing is better.  He denies any chest discomfort, dizziness or palpitations.

 He denies any PND or orthopnea. His renal function is worse.  He continues to 

be on aspirin once a day, lisinopril 20 mg twice a day, Glucophage 500 mg twice 

a day, metoprolol 25 mg 3 times a day, spironolactone 25 mg daily. He has been 

seen and evaluated by nephrology and his Lasix has been decreased. 





3/28


Patient seen and examined.  Patient states overall he has been feeling much 

better.  He denies any chest pain or pressure.  States his shortness breath is 

nearly at his baseline however not quite.  He still does have mild crackles at 

the bases on exam.  He was decreased to Lasix 20 mg oral daily.  Creatinine has 

been stable, mildly improved to 1.2 today.








PHYSICAL EXAMINATION


Vital signs reviewed.


CONSTITUTIONAL: No apparent distress. 


HEENT: Head is normocephalic. Pupils are equal, round. Sclerae anicteric. Mucous

membranes of the mouth are moist.  No JVD. No carotid bruit.


CHEST EXAMINATION: Lungs are clear to auscultation. +mild crackles ar bases


HEART EXAMINATION: Regular rate and rhythm. S1, S2 heard. No murmurs, gallops or

rub.


ABDOMEN: Soft, nontender. Positive bowel sounds.


EXTREMITIES: 2+ peripheral pulses, no lower extremity edema and no calf 

tenderness.


NEUROLOGIC EXAMINATION: Patient is awake, alert and oriented x3. 





Assessment: 


1.  Acute on chronic systolic heart failure ejection fraction 40-45%


2.  Chronic kidney disease appears stable


3.  Abnormal MPI from November however has been fairly stable


4.  History of hypertension


5.  Mildly elevated troponins, chronically elevated related to chronic kidney 

disease do not suspect acute coronary syndrome





Plan: 


Patient overall feels much better after diuresis and main presentation of 

shortness breath appears related to congestive heart failure and has improved af

ter diuretics.  He still does have mild crackles at the bases and given his CKD 

would recommend Lasix 80 mg daily going home.  He appears stable for discharge 

home.  Discussed possible heart catheterization however given improvement in 

symptoms and stress test with questionable small area of ischemia without any 

symptoms suggestive of acute coronary syndrome continue with medical therapy and

outpatient follow-up and may consider heart catheterization in the future.  

Stable for DC home on Lasix 80mg daily.








Objective





- Vital Signs


Vital signs: 


                                   Vital Signs











Temp  97.8 F   03/28/22 07:36


 


Pulse  54 L  03/28/22 07:36


 


Resp  18   03/28/22 07:36


 


BP  157/71   03/28/22 07:36


 


Pulse Ox  98   03/28/22 07:36








                                 Intake & Output











 03/27/22 03/28/22 03/28/22





 18:59 06:59 18:59


 


Intake Total 360  


 


Output Total 50 600 


 


Balance 310 -600 


 


Weight  90.9 kg 


 


Intake:   


 


  Oral 360  


 


Output:   


 


  Urine 50 600 


 


Other:   


 


  Voiding Method  Urinal 


 


  # Bowel Movements  0 














- Labs


CBC & Chem 7: 


                                 03/25/22 03:09





                                 03/28/22 07:12


Labs: 


                  Abnormal Lab Results - Last 24 Hours (Table)











  03/27/22 03/27/22 03/27/22 Range/Units





  11:34 13:40 16:54 


 


Sodium     (137-145)  mmol/L


 


BUN     (9-20)  mg/dL


 


Creatinine     (0.66-1.25)  mg/dL


 


Glucose     (74-99)  mg/dL


 


POC Glucose (mg/dL)  115 H   124 H  (75-99)  mg/dL


 


Calcium     (8.4-10.2)  mg/dL


 


Urine Protein   1+ H   (Negative)  


 


Urine Mucus   Rare H   (None)  /hpf














  03/27/22 03/28/22 03/28/22 Range/Units





  21:02 05:42 07:12 


 


Sodium    136 L  (137-145)  mmol/L


 


BUN    34 H  (9-20)  mg/dL


 


Creatinine    1.28 H  (0.66-1.25)  mg/dL


 


Glucose    144 H  (74-99)  mg/dL


 


POC Glucose (mg/dL)  154 H  146 H   (75-99)  mg/dL


 


Calcium    7.5 L  (8.4-10.2)  mg/dL


 


Urine Protein     (Negative)  


 


Urine Mucus     (None)  /hpf

## 2022-03-28 NOTE — P.PN
Subjective





Patient is seen in follow-up for acute kidney injury.  Renal function better.  

Denies chest pain or shortness of breath.  Good urine output.  No vomiting or 

diarrhea.  Blood pressure stable this morning.





Vital signs are stable.


General: The patient appeared well nourished and normally developed. 


HEENT: Head exam is unremarkable.


LUNGS: Breath sounds decreased.


HEART: Rate and Rhythm are regular.


ABDOMEN: Soft, no distention.


EXTREMITITES: No edema.





Objective





- Vital Signs


Vital signs: 


                                   Vital Signs











Temp  97.8 F   03/28/22 07:36


 


Pulse  54 L  03/28/22 07:36


 


Resp  18   03/28/22 07:36


 


BP  157/71   03/28/22 07:36


 


Pulse Ox  98   03/28/22 07:36








                                 Intake & Output











 03/27/22 03/28/22 03/28/22





 18:59 06:59 18:59


 


Intake Total 360  


 


Output Total 50 600 


 


Balance 310 -600 


 


Weight  90.9 kg 


 


Intake:   


 


  Oral 360  


 


Output:   


 


  Urine 50 600 


 


Other:   


 


  Voiding Method  Urinal 


 


  # Bowel Movements  0 














- Labs


CBC & Chem 7: 


                                 03/25/22 03:09





                                 03/28/22 07:12


Labs: 


                  Abnormal Lab Results - Last 24 Hours (Table)











  03/27/22 03/27/22 03/27/22 Range/Units





  11:34 13:40 16:54 


 


Sodium     (137-145)  mmol/L


 


BUN     (9-20)  mg/dL


 


Creatinine     (0.66-1.25)  mg/dL


 


Glucose     (74-99)  mg/dL


 


POC Glucose (mg/dL)  115 H   124 H  (75-99)  mg/dL


 


Calcium     (8.4-10.2)  mg/dL


 


Urine Protein   1+ H   (Negative)  


 


Urine Mucus   Rare H   (None)  /hpf














  03/27/22 03/28/22 03/28/22 Range/Units





  21:02 05:42 07:12 


 


Sodium    136 L  (137-145)  mmol/L


 


BUN    34 H  (9-20)  mg/dL


 


Creatinine    1.28 H  (0.66-1.25)  mg/dL


 


Glucose    144 H  (74-99)  mg/dL


 


POC Glucose (mg/dL)  154 H  146 H   (75-99)  mg/dL


 


Calcium    7.5 L  (8.4-10.2)  mg/dL


 


Urine Protein     (Negative)  


 


Urine Mucus     (None)  /hpf














Assessment and Plan


Plan: 





Assessment:


1.  Acute kidney injury mostly prerenal secondary to diuresis.  Renal function 

better today.  Creatinine 1.28.


2.  Chronic kidney disease stage III secondary to nephrosclerosis with baseline 

creatinine 1.1-1.2.


3.  Hypertension with chronic kidney disease.


4.  Acute on chronic systolic CHF with ejection fraction of 40-55% with mild to 

moderate mitral regurgitation, moderate to severe tricuspid regurgitation and 

pulmonary hypertension.


5.  Diabetes mellitus.





Plan:


Lasix increased to 80 mg orally once today by cardiology.


Check renal ultrasound.


Avoid nephrotoxins.


Monitor renal function with increased dose of diuretic.


Repeat BMP and magnesium level 2-3 days postdischarge.  Follow up outpatient in 

1 week.


Advised patient to monitor his weight closely at home and to call physician if 

gains more than 3 pounds in 1 week duration or edema worsens.


He was also advised to follow low salt diet upon discharge.

## 2022-03-28 NOTE — P.DS
Providers


Date of admission: 


03/25/22 06:05





Attending physician: 


Man Yeboah MD





Consults: 





                                        





03/25/22 06:10


Consult Physician Routine 


   Consulting Provider: Aquiles Singh


   Consult Reason/Comments: CHF exacerbation


   Do you want consulting provider notified?: Yes





03/27/22 09:06


Consult Physician Routine 


   Consulting Provider: Kelvin Pena


   Consult Reason/Comments: elevated kidney function


   Do you want consulting provider notified?: Yes











Primary care physician: 


[facility]





Hospital Course: 





Diagnoses:


Acute systolic CHF exacerbation, ejection fraction 40-45%


Elevated troponin, with no EKG changes or chest pain.  Patient instructed to 

follow up with cardiologist for stress test/cardiac cath as an outpatient and he

agrees


Diabetes mellitus, with uncontrolled glucose and hemoglobin A1c 7.5%


Hypertension


Hyperlipidemia


Hypothyroidism





Hospital course:


his is a pleasant 83 yo Female with past medical history of hypertension , 

hyperlipidemia, and hypothyoridism . she follow up with the [facility] 


this time she presents to the hospital with dyspnea and orthopnea, worsening 

over the last few days.  Patient was found and heart failure with elevated 

proBNP.  EF was 40-45% and cardiologist evaluated the patient, patient responded

to therapy with IV Lasix which is used was to oral Lasix 80 mg daily upon 

discharge.  Because of his worsening creatinine thought secondary to diuresis 

and possible other medication patient instructed to do ischemic workup for his 

heart as an outpatient and he agrees.


Cardiologist at the nephrologist will follow the patient closely, his creatinine

went up yesterday to 1.4, today improved to 1.2.  However patient is still at-

risk of worsening kidney function after doubling the dose of lisinopril, Lasix 

and add Aldactone, because of these we held his home dose of metformin and 

placed him on linagliptin, his sugars controlled


[facility] for

linagliptin to be covered therefore a prescription for 2-3 days was provided for

him upon discharge for out of pocket cost and instructed to follow [facility] with [facility] 

[facility] for [facility] of the linagliptin .  Patient informed and he agrees.


Patient was cleared for nephrologist for discharge with recommendation to check 

BMP and magnesium in 2-3 days, I made an appointment for him with his PCP on 4/5

and he agrees to follow-up as recommended with his PCP Dr.tery stevens, was not 

available in the office so I spoke with his nurse Ms. John and discussed the 

case with her and she kindly took note of this to check his blood test in 2-3 

days


That patient was fairly asymptomatic on the day of discharge with no chest pain 

or dyspnea, no GI or urinary symptoms.  No fever.  No neurological complaints.


Problems and management plan were discussed with the patient and he verbalized 

understanding and acceptance


Patient was found stable and can be discharged home however he needs follow-up 

as an outpatient. Patient was instructed to follow up with PCP within one week 

and patient agrees with the appointments made for him on 4/5


Patient was instructed to follow up with his cardiologist Dr. Cueva in one week 

and he agrees to call and make appointment


Patient also instructed to follow up with nephrologist Dr. Martins in one week 

and he agrees





Physical exam


Gen: patient is a AAOx3, no distress


CVS: S1-S2, RRR, no murmur


Lungs: B/L CTA, no wheezing


Abdomen: soft, no distention, no tenderness, positive bowel sounds


Extremity: no leg edema or induration





Time spent more than 35 minutes


Patient Condition at Discharge: Good





Plan - Discharge Summary


New Discharge Prescriptions: 


New


   RX: Spironolactone [Aldactone] 25 mg PO DAILY #30 tab


   RX: Furosemide [Lasix] 80 mg PO DAILY #30 tab


   RX: Metoprolol Tartrate [Lopressor] 50 mg PO BID #60 tab


   RX: Linagliptin [Tradjenta] 5 mg PO DAILY #30 tablet


   RX: lisinopriL [Zestril] 20 mg PO BID #60 tab





Continue


   RX: lisinopriL [Prinivil] 20 mg PO DAILY


   RX: Levothyroxine Sodium [Synthroid] 125 mcg PO DAILY


   RX: Carboxymethylcellulose Sodium [Refresh Tears] 1 drop BOTH EYES QID PRN


     PRN Reason: Dry Eye(S)


   RX: Aspirin EC [Ecotrin Low Dose] 81 mg PO Q48H


   RX: Acetaminophen Tab [Tylenol] 650 mg PO Q6HR PRN  tab


     PRN Reason: Mild Pain Or Fever > 100.5


   RX: Simvastatin [Zocor] 40 mg PO HS





Discontinued


   RX: Metoprolol Tartrate [Lopressor] 25 mg PO BID 30 Days #60 tab


   RX: Sildenafil Citrate [Viagra] 100 mg PO DAILY PRN


     PRN Reason: sexual intercourse


   RX: metFORMIN  mg PO BID


   RX: Furosemide [Lasix] 40 mg PO DAILY 30 Days #30 tab


Discharge Medication List





RX: Aspirin EC [Ecotrin Low Dose] 81 mg PO Q48H 11/16/21 [History]


RX: Carboxymethylcellulose Sodium [Refresh Tears] 1 drop BOTH EYES QID PRN 

11/16/21 [History]


RX: Levothyroxine Sodium [Synthroid] 125 mcg PO DAILY 11/16/21 [History]


RX: Simvastatin [Zocor] 40 mg PO HS 11/16/21 [History]


RX: lisinopriL [Prinivil] 20 mg PO DAILY 11/16/21 [History]


RX: Acetaminophen Tab [Tylenol] 650 mg PO Q6HR PRN  tab 11/19/21 [Rx]


RX: Furosemide [Lasix] 80 mg PO DAILY #30 tab 03/28/22 [Rx]


RX: Linagliptin [Tradjenta] 5 mg PO DAILY #30 tablet 03/28/22 [Rx]


RX: Metoprolol Tartrate [Lopressor] 50 mg PO BID #60 tab 03/28/22 [Rx]


RX: Spironolactone [Aldactone] 25 mg PO DAILY #30 tab 03/28/22 [Rx]


RX: lisinopriL [Zestril] 20 mg PO BID #60 tab 03/28/22 [Rx]








Follow up Appointment(s)/Referral(s): 


Jose Luis Martins DO [STAFF PHYSICIAN] - 1 Week


David Cueva MD [STAFF PHYSICIAN] - 1 Week


[facility],Clinic [Primary Care Provider] - 04/05/22 3:30 am


Patient Instructions/Handouts:  Heart Failure (DC), Dyspnea (DC)


Activity/Diet/Wound Care/Special Instructions: 


Heart healthy diet with fluid and salt restriction





Activity is restricted until you see your doctor 








We recommend to check her glucose 4 times a day, before each meal and at 

bedtime, keep the results in a log book and bring it to your doctor on your 

appointment date


if your glucose less than 70 or more than 400 been call 911 and come to 

emergency room








discharge instructions:


We recommend to check your basic metabolic panel (BMP) and magnesium level (Mg) 

in 2-3 days if possible.  [facility] 











Discharge Disposition: HOME SELF-CARE

## 2022-03-28 NOTE — P.EN
Patient normally takes metformin in the outpatient setting although kidney 

functions are elevated and also adjustments to cardiac medications including 

lisinopril have been increased and patient is at increased risk for worsening 

kidney functions and was started on tradjenta 5mg and blood sugars fairly 

controlled and prescription provided for this in the outpatient setting.  Per 

nursing staff co-pays are over $5-$600 and unable to afford and patient normally

goes to the [facility]'s pharmacy in medical supply and will provide 

prescription along with a 2-3 day supply of tradjenta in order for the patient 

[facility].





The impression and plan of care has been dictated by Lorri Crenshaw, Nurse 

Practitioner as directed.





Dr. Edilia MD


I have performed a history and examination and MDM of this patient, discussed 

the same with the dictator, and  agree with the dictator's assessment and plan 

as written ,documented as a scribe. Based on total visit time,  I have performed

more than 50% of the visit.

## 2022-05-24 ENCOUNTER — HOSPITAL ENCOUNTER (OUTPATIENT)
Dept: HOSPITAL 47 - RADUSWWP | Age: 82
Discharge: HOME | End: 2022-05-24
Attending: FAMILY MEDICINE
Payer: OTHER GOVERNMENT

## 2022-05-24 DIAGNOSIS — N17.9: Primary | ICD-10-CM

## 2022-05-24 PROCEDURE — 76770 US EXAM ABDO BACK WALL COMP: CPT

## 2022-05-24 NOTE — US
EXAMINATION TYPE: US kidneys/renal and bladder

 

DATE OF EXAM: 5/24/2022

 

COMPARISON: NONE

 

CLINICAL HISTORY: 82-year-old male N17.9 Acute kidney failure. Abnormal labs

 

TECHNIQUE: Multiple sonographic images of the kidneys and bladder are obtained.

 

FINDINGS:

 

EXAM MEASUREMENTS:

 

Right Kidney:  11.2 x 5.1 x 5.4 cm

Left Kidney: 9.6 x 5.6 x 4.9 cm

 

 

 

 

Right Kidney: Tiny upper pole cortical cyst measuring 0.8 x 0.7 x 0.9 cm. Internal echoes felt to be 
artifactual. No hydronephrosis. 

Left Kidney: Cyst upper melinda 3.8 x 3.9 x 3.9 cm, cyst mid/lateral= 3.8 x 3.8 x 2.8 cm. No hydronephros
is.

 

  

Bladder: Possible small bladder wall diverticula/trabeculations, otherwise appeared wnl

**Bilateral Jets seen: Only right jet visualized 

 

 

 

 

 

IMPRESSION:

No hydronephrosis. Renal cysts measuring up to 3.9 cm on the left. Somewhat trabeculated appearance t
o the bladder. Correlate for any potential chronic bladder outlet obstruction/BPH.

## 2022-09-21 ENCOUNTER — HOSPITAL ENCOUNTER (INPATIENT)
Dept: HOSPITAL 47 - EC | Age: 82
LOS: 2 days | Discharge: HOME | DRG: 640 | End: 2022-09-23
Attending: INTERNAL MEDICINE | Admitting: INTERNAL MEDICINE
Payer: OTHER GOVERNMENT

## 2022-09-21 DIAGNOSIS — Z60.2: ICD-10-CM

## 2022-09-21 DIAGNOSIS — E87.2: ICD-10-CM

## 2022-09-21 DIAGNOSIS — Z79.84: ICD-10-CM

## 2022-09-21 DIAGNOSIS — Z28.310: ICD-10-CM

## 2022-09-21 DIAGNOSIS — E78.5: ICD-10-CM

## 2022-09-21 DIAGNOSIS — Z79.82: ICD-10-CM

## 2022-09-21 DIAGNOSIS — Z66: ICD-10-CM

## 2022-09-21 DIAGNOSIS — T46.4X5A: ICD-10-CM

## 2022-09-21 DIAGNOSIS — E87.5: Primary | ICD-10-CM

## 2022-09-21 DIAGNOSIS — E89.0: ICD-10-CM

## 2022-09-21 DIAGNOSIS — E86.0: ICD-10-CM

## 2022-09-21 DIAGNOSIS — I95.9: ICD-10-CM

## 2022-09-21 DIAGNOSIS — N18.4: ICD-10-CM

## 2022-09-21 DIAGNOSIS — N17.0: ICD-10-CM

## 2022-09-21 DIAGNOSIS — E11.22: ICD-10-CM

## 2022-09-21 DIAGNOSIS — Z79.890: ICD-10-CM

## 2022-09-21 DIAGNOSIS — I12.9: ICD-10-CM

## 2022-09-21 DIAGNOSIS — Z87.891: ICD-10-CM

## 2022-09-21 DIAGNOSIS — Z79.899: ICD-10-CM

## 2022-09-21 LAB
ALBUMIN SERPL-MCNC: 3.8 G/DL (ref 3.5–5)
ALBUMIN SERPL-MCNC: 4.6 G/DL (ref 3.5–5)
ALP SERPL-CCNC: 69 U/L (ref 38–126)
ALP SERPL-CCNC: 82 U/L (ref 38–126)
ALT SERPL-CCNC: 17 U/L (ref 4–49)
ALT SERPL-CCNC: 20 U/L (ref 4–49)
ANION GAP SERPL CALC-SCNC: 16 MMOL/L
ANION GAP SERPL CALC-SCNC: 16 MMOL/L
AST SERPL-CCNC: 22 U/L (ref 17–59)
AST SERPL-CCNC: 24 U/L (ref 17–59)
BASOPHILS # BLD AUTO: 0.1 K/UL (ref 0–0.2)
BASOPHILS NFR BLD AUTO: 1 %
BUN SERPL-SCNC: 72 MG/DL (ref 9–20)
BUN SERPL-SCNC: 72 MG/DL (ref 9–20)
CALCIUM SPEC-MCNC: 8 MG/DL (ref 8.4–10.2)
CALCIUM SPEC-MCNC: 8.5 MG/DL (ref 8.4–10.2)
CHLORIDE SERPL-SCNC: 103 MMOL/L (ref 98–107)
CHLORIDE SERPL-SCNC: 103 MMOL/L (ref 98–107)
CO2 SERPL-SCNC: 16 MMOL/L (ref 22–30)
CO2 SERPL-SCNC: 17 MMOL/L (ref 22–30)
EOSINOPHIL # BLD AUTO: 0.4 K/UL (ref 0–0.7)
EOSINOPHIL NFR BLD AUTO: 6 %
ERYTHROCYTE [DISTWIDTH] IN BLOOD BY AUTOMATED COUNT: 3.82 M/UL (ref 4.3–5.9)
ERYTHROCYTE [DISTWIDTH] IN BLOOD: 13.2 % (ref 11.5–15.5)
GLUCOSE BLD-MCNC: 181 MG/DL (ref 70–110)
GLUCOSE SERPL-MCNC: 196 MG/DL (ref 74–99)
GLUCOSE SERPL-MCNC: 200 MG/DL (ref 74–99)
HCT VFR BLD AUTO: 37.7 % (ref 39–53)
HGB BLD-MCNC: 12 GM/DL (ref 13–17.5)
IRON SERPL-MCNC: 127 UG/DL (ref 65–175)
LYMPHOCYTES # SPEC AUTO: 2.1 K/UL (ref 1–4.8)
LYMPHOCYTES NFR SPEC AUTO: 29 %
MAGNESIUM SPEC-SCNC: 1.8 MG/DL (ref 1.6–2.3)
MCH RBC QN AUTO: 31.5 PG (ref 25–35)
MCHC RBC AUTO-ENTMCNC: 31.9 G/DL (ref 31–37)
MCV RBC AUTO: 98.7 FL (ref 80–100)
MONOCYTES # BLD AUTO: 0.4 K/UL (ref 0–1)
MONOCYTES NFR BLD AUTO: 5 %
NEUTROPHILS # BLD AUTO: 4.1 K/UL (ref 1.3–7.7)
NEUTROPHILS NFR BLD AUTO: 56 %
PLATELET # BLD AUTO: 273 K/UL (ref 150–450)
POTASSIUM SERPL-SCNC: 6.2 MMOL/L (ref 3.5–5.1)
POTASSIUM SERPL-SCNC: 8.2 MMOL/L (ref 3.5–5.1)
PROT SERPL-MCNC: 6.6 G/DL (ref 6.3–8.2)
PROT SERPL-MCNC: 8 G/DL (ref 6.3–8.2)
SODIUM SERPL-SCNC: 135 MMOL/L (ref 137–145)
SODIUM SERPL-SCNC: 136 MMOL/L (ref 137–145)
TIBC SERPL-MCNC: 356 UG/DL (ref 228–460)
WBC # BLD AUTO: 7.2 K/UL (ref 3.8–10.6)

## 2022-09-21 PROCEDURE — 83550 IRON BINDING TEST: CPT

## 2022-09-21 PROCEDURE — 96372 THER/PROPH/DIAG INJ SC/IM: CPT

## 2022-09-21 PROCEDURE — 85025 COMPLETE CBC W/AUTO DIFF WBC: CPT

## 2022-09-21 PROCEDURE — 84132 ASSAY OF SERUM POTASSIUM: CPT

## 2022-09-21 PROCEDURE — 83036 HEMOGLOBIN GLYCOSYLATED A1C: CPT

## 2022-09-21 PROCEDURE — 83735 ASSAY OF MAGNESIUM: CPT

## 2022-09-21 PROCEDURE — 84100 ASSAY OF PHOSPHORUS: CPT

## 2022-09-21 PROCEDURE — 80053 COMPREHEN METABOLIC PANEL: CPT

## 2022-09-21 PROCEDURE — 36415 COLL VENOUS BLD VENIPUNCTURE: CPT

## 2022-09-21 PROCEDURE — 96361 HYDRATE IV INFUSION ADD-ON: CPT

## 2022-09-21 PROCEDURE — 96366 THER/PROPH/DIAG IV INF ADDON: CPT

## 2022-09-21 PROCEDURE — 96375 TX/PRO/DX INJ NEW DRUG ADDON: CPT

## 2022-09-21 PROCEDURE — 93005 ELECTROCARDIOGRAM TRACING: CPT

## 2022-09-21 PROCEDURE — 83540 ASSAY OF IRON: CPT

## 2022-09-21 PROCEDURE — 80048 BASIC METABOLIC PNL TOTAL CA: CPT

## 2022-09-21 PROCEDURE — 96365 THER/PROPH/DIAG IV INF INIT: CPT

## 2022-09-21 PROCEDURE — 99291 CRITICAL CARE FIRST HOUR: CPT

## 2022-09-21 PROCEDURE — 76770 US EXAM ABDO BACK WALL COMP: CPT

## 2022-09-21 RX ADMIN — HEPARIN SODIUM SCH UNIT: 5000 INJECTION INTRAVENOUS; SUBCUTANEOUS at 23:12

## 2022-09-21 RX ADMIN — METOPROLOL TARTRATE SCH: 50 TABLET, FILM COATED ORAL at 19:56

## 2022-09-21 RX ADMIN — HEPARIN SODIUM SCH UNIT: 5000 INJECTION INTRAVENOUS; SUBCUTANEOUS at 16:39

## 2022-09-21 RX ADMIN — SODIUM POLYSTYRENE SULFONATE ONE GM: 15 SUSPENSION ORAL; RECTAL at 12:46

## 2022-09-21 RX ADMIN — CEFAZOLIN SCH MLS/HR: 330 INJECTION, POWDER, FOR SOLUTION INTRAMUSCULAR; INTRAVENOUS at 19:56

## 2022-09-21 RX ADMIN — ATORVASTATIN CALCIUM SCH MG: 20 TABLET, FILM COATED ORAL at 19:56

## 2022-09-21 RX ADMIN — CEFAZOLIN SCH MLS/HR: 330 INJECTION, POWDER, FOR SOLUTION INTRAMUSCULAR; INTRAVENOUS at 15:59

## 2022-09-21 SDOH — SOCIAL STABILITY - SOCIAL INSECURITY: PROBLEMS RELATED TO LIVING ALONE: Z60.2

## 2022-09-21 NOTE — P.HPIM
History of Present Illness


H&P Date: 09/21/22


Chief Complaint: Abnormal labs 





This a 82-year-old male presents to [facility] emergency Department 

chief complaint of abnormal labs.  Patient states is at his PCPs office in which

she was told his potassium was elevated.  Patient states that he was on 

potassium supplements states that he was on Lasix also.  Patient has noted that 

he's had some renal dysfunction versus has been evaluated in the past.  Appears 

to be worsening.  Patient states he is currently asymptomatic denies chest pain 

palpitations dizziness nausea vomiting diarrhea constipation or fevers chills no

other associated complaints.  Patient lives alone.  However, his daughter lives 

a short distance away.  She is alert and oriented 4 and states he does not want

to bother his family with his medical issues at this time.  Patient's daughter 

Palma is on vacation in [place] and does not want to worry her.  Patient states he 

is a DO NOT RESUSCITATE.  Patient has no complaints and is sitting in bed 

comfortably.  She denies history of heart failure.  Patient has a past medical 

history of hypertension, hyperlipidemia, renal insufficiency, and 

non-insulin-dependent diabetes mellitus.








Review of Systems





A 14 point review of systems was assessed patient was only positive for those 

pertinent in HPI.





Past Medical History


Past Medical History: Hyperlipidemia, Thyroid Disorder


History of Any Multi-Drug Resistant Organisms: None Reported


Additional Past Surgical History / Comment(s): Thyroidectomy


Past Anesthesia/Blood Transfusion Reactions: No Reported Reaction


Past Psychological History: No Psychological Hx Reported


Smoking Status: Former smoker


Past Alcohol Use History: None Reported


Past Drug Use History: None Reported





- Past Family History


  ** Mother


Family Medical History: Dementia


Additional Family Medical History / Comment(s): alzheimers





  ** Father


Family Medical History: Congestive Heart Failure (CHF)





Medications and Allergies


                                Home Medications











 Medication  Instructions  Recorded  Confirmed  Type


 


Aspirin EC [Ecotrin Low Dose] 81 mg PO Q48H 11/16/21 09/21/22 History


 


Levothyroxine Sodium [Synthroid] 125 mcg PO DAILY 11/16/21 09/21/22 History


 


Simvastatin [Zocor] 40 mg PO HS 11/16/21 09/21/22 History


 


Metoprolol Tartrate [Lopressor] 50 mg PO BID #60 tab 03/28/22 09/21/22 Rx


 


Spironolactone [Aldactone] 25 mg PO DAILY #30 tab 03/28/22 09/21/22 Rx


 


lisinopriL [Zestril] 20 mg PO BID #60 tab 03/28/22 09/21/22 Rx


 


Furosemide [Lasix] 80 mg PO DAILY 09/21/22 09/21/22 History


 


Potassium Chloride ER [K-Dur 20] 20 meq PO DAILY 09/21/22 09/21/22 History


 


metFORMIN HCL [Glucophage] 500 mg PO BID 09/21/22 09/21/22 History








                                    Allergies











Allergy/AdvReac Type Severity Reaction Status Date / Time


 


No Known Allergies Allergy   Verified 09/21/22 13:16














Physical Exam


Osteopathic Statement: *.  No significant issues noted on an osteopathic 

structural exam other than those noted in the History and Physical/Consult.


Vitals: 


                                   Vital Signs











  Temp Pulse Resp BP Pulse Ox


 


 09/21/22 12:54   68  16  130/68  98


 


 09/21/22 11:56   60  16  104/69  99


 


 09/21/22 10:58  98.4 F  62  16  94/62  99








                                Intake and Output











 09/21/22 09/21/22 09/21/22





 06:59 14:59 22:59


 


Other:   


 


  Weight  91.172 kg 














General: [non toxic], [no distress], [appears at stated age]


Derm: [warm], [dry]


Head: [atraumatic], [normocephalic], [symmetric]


Eyes: [EOMI], [no lid lag], [anicteric sclera]


Mouth: [no lip lesion], [mucus membranes moist]


Cardiovascular: [S1S2 reg], [no murmur], [positive posterior tibial pulse 

bilateral], 


Lungs: [CTA bilateral], [no rhonchi, no rales] , [no accessory muscle use]


Abdominal: [soft], [ nontender to palpation], [no guarding], [no appreciable 

organomegaly]


Ext: [no gross muscle atrophy], [no edema], [no contractures]


Neuro: [ CN II-XI grossly intact], [no focal neuro deficits]


Psych: [Alert], [oriented], [appropriate affect] 





Results


CBC & Chem 7: 


                                 09/21/22 11:27





                                 09/21/22 14:28


Labs: 


                  Abnormal Lab Results - Last 24 Hours (Table)











  09/21/22 09/21/22 09/21/22 Range/Units





  11:27 11:27 14:28 


 


RBC  3.82 L    (4.30-5.90)  m/uL


 


Hgb  12.0 L    (13.0-17.5)  gm/dL


 


Hct  37.7 L    (39.0-53.0)  %


 


Sodium   135 L   (137-145)  mmol/L


 


Potassium   8.2 H*  6.6 H*  (3.5-5.1)  mmol/L


 


Carbon Dioxide   16 L   (22-30)  mmol/L


 


BUN   72 H   (9-20)  mg/dL


 


Creatinine   2.54 H   (0.66-1.25)  mg/dL


 


Glucose   196 H   (74-99)  mg/dL














Thrombosis Risk Factor Assmnt





- DVT/VTE Prophylaxis


DVT/VTE Prophylaxis: Pharmacologic Prophylaxis ordered





Assessment and Plan


Assessment: 





1. Hyperkalemia likely caused by dehydration and medications 


Hold all nephrotoxic agents


Lasix, Metformin, spironolactone, lisinopril all held


Consult nephrology placed


Patient given insulin, calcium gluconate, Kayexalate


Repeat potassium level was 6.6 from 8.2


Repeat potassium level ordered


IV fluids placed


Telemetry


Vitals every 4





2.  Non-insulin-dependent diabetes mellitus


Hold metformin


Insulin sliding scale with long-acting insulin placed


Hypoglycemic protocol ordered








3.  History of hypertension currently controlled


Diuretics and ACE inhibitor held


Metoprolol continued with parameters placed





4.  History of hyperlipidemia


resume statin





5.  GI DVT prophylaxis





6.  A.m. labs





7.  PT OT





Patient is a DO NOT RESUSCITATE


Admit to medical telemetry


Anticipated length of stay greater than 2 days


Greater than 45 minutes spent coordinating care, counseling, and documenting





Time with Patient: Greater than 30

## 2022-09-21 NOTE — US
EXAMINATION TYPE: US kidneys/renal and bladder

 

DATE OF EXAM: 9/21/2022

 

COMPARISON: 5/24/22

 

CLINICAL HISTORY: MERLINE.

 

EXAM MEASUREMENTS:

 

Right Kidney:  11.0 x 4.2 x 5.5 cm

Left Kidney: 11.7 x 5.7 x 6.2 cm

 

Right Kidney: No hydronephrosis or masses seen  

Left Kidney: Cyst seen in superior pole measuring 3.9 x 3.6 x 3.4 cm  

Bladder: wnl

**Bilateral Jets seen: Only right jet seen

 

IMPRESSION:

1.  No evidence of obstructive uropathy.

2.  Left renal cyst.

## 2022-09-21 NOTE — ED
General Adult HPI





- General


Chief complaint: Recheck/Abnormal Lab/Rx


Stated complaint: high potassium


Time Seen by Provider: 09/21/22 11:07


Source: patient, RN notes reviewed


Mode of arrival: ambulatory


Limitations: no limitations





- History of Present Illness


Initial comments: 


This a 82-year-old male presents emergency Department chief complaint of abnor

mal labs.  Patient states is at his PCPs office in which she was told his 

potassium was elevated.  Patient states that he was on potassium supplements 

states that he was on Lasix also.  Patient states he does feel that they.  

Patient has noted that he's had some renal dysfunction versus has been evaluated

in the past.  Appears to be worsening.  Patient states she is currently 

symptomatic denies chest pain palpitations dizziness nausea vomiting diarrhea 

constipation or fevers chills no other associated complaints.








- Related Data


                                Home Medications











 Medication  Instructions  Recorded  Confirmed


 


Aspirin EC [Ecotrin Low Dose] 81 mg PO Q48H 11/16/21 03/25/22


 


Carboxymethylcellulose Sodium 1 drop BOTH EYES QID PRN 11/16/21 03/25/22





[Refresh Tears]   


 


Levothyroxine Sodium [Synthroid] 125 mcg PO DAILY 11/16/21 03/25/22


 


Simvastatin [Zocor] 40 mg PO HS 11/16/21 03/25/22


 


lisinopriL [Prinivil] 20 mg PO DAILY 11/16/21 03/25/22








                                  Previous Rx's











 Medication  Instructions  Recorded


 


Acetaminophen Tab [Tylenol] 650 mg PO Q6HR PRN  tab 11/19/21


 


Furosemide [Lasix] 80 mg PO DAILY #30 tab 03/28/22


 


Linagliptin [Tradjenta] 5 mg PO DAILY #30 tablet 03/28/22


 


Metoprolol Tartrate [Lopressor] 50 mg PO BID #60 tab 03/28/22


 


Spironolactone [Aldactone] 25 mg PO DAILY #30 tab 03/28/22


 


lisinopriL [Zestril] 20 mg PO BID #60 tab 03/28/22











                                    Allergies











Allergy/AdvReac Type Severity Reaction Status Date / Time


 


No Known Allergies Allergy   Verified 09/21/22 12:59














Review of Systems


ROS Statement: 


Those systems with pertinent positive or pertinent negative responses have been 

documented in the HPI.





ROS Other: All systems not noted in ROS Statement are negative.





Past Medical History


Past Medical History: Hyperlipidemia, Thyroid Disorder


History of Any Multi-Drug Resistant Organisms: None Reported


Additional Past Surgical History / Comment(s): Thyroidectomy


Past Anesthesia/Blood Transfusion Reactions: No Reported Reaction


Past Psychological History: No Psychological Hx Reported


Smoking Status: Former smoker


Past Alcohol Use History: None Reported


Past Drug Use History: None Reported





- Past Family History


  ** Mother


Family Medical History: Dementia


Additional Family Medical History / Comment(s): alzheimers





  ** Father


Family Medical History: Congestive Heart Failure (CHF)





General Exam


Limitations: no limitations


General appearance: alert, in no apparent distress


Head exam: Present: atraumatic, normocephalic, normal inspection


Eye exam: Present: normal appearance, PERRL, EOMI.  Absent: scleral icterus, 

conjunctival injection, periorbital swelling


ENT exam: Present: normal exam, normal oropharynx, mucous membranes moist


Neck exam: Present: normal inspection, full ROM.  Absent: tenderness, 

meningismus, lymphadenopathy


Respiratory exam: Present: normal lung sounds bilaterally.  Absent: respiratory 

distress, wheezes, rales, rhonchi, stridor


Cardiovascular Exam: Present: normal rhythm, bradycardia, normal heart sounds.  

Absent: systolic murmur, diastolic murmur, rubs, gallop, clicks


GI/Abdominal exam: Present: soft, normal bowel sounds.  Absent: distended, t

enderness, guarding, rebound, rigid


Back exam: Absent: CVA tenderness (R), CVA tenderness (L)


Neurological exam: Present: alert


Skin exam: Present: warm, dry, intact, normal color.  Absent: rash





Course


                                   Vital Signs











  09/21/22 09/21/22 09/21/22





  10:58 11:56 12:54


 


Temperature 98.4 F  


 


Pulse Rate 62 60 68


 


Respiratory 16 16 16





Rate   


 


Blood Pressure 94/62 104/69 130/68


 


O2 Sat by Pulse 99 99 98





Oximetry   














Medical Decision Making





- Medical Decision Making


82-year-old male presented for hyperkalemia.  Patient is asymptomatic was likely

 cause for acute kidney injury, potassium supplement.  Patient was given hype

rkalemic cocktail will be admitted for further monitoring.








- Lab Data


Result diagrams: 


                                 09/21/22 11:27





                                 09/21/22 11:27


                                   Lab Results











  09/21/22 09/21/22 Range/Units





  11:27 11:27 


 


WBC  7.2   (3.8-10.6)  k/uL


 


RBC  3.82 L   (4.30-5.90)  m/uL


 


Hgb  12.0 L   (13.0-17.5)  gm/dL


 


Hct  37.7 L   (39.0-53.0)  %


 


MCV  98.7   (80.0-100.0)  fL


 


MCH  31.5   (25.0-35.0)  pg


 


MCHC  31.9   (31.0-37.0)  g/dL


 


RDW  13.2   (11.5-15.5)  %


 


Plt Count  273   (150-450)  k/uL


 


MPV  8.1   


 


Neutrophils %  56   %


 


Lymphocytes %  29   %


 


Monocytes %  5   %


 


Eosinophils %  6   %


 


Basophils %  1   %


 


Neutrophils #  4.1   (1.3-7.7)  k/uL


 


Lymphocytes #  2.1   (1.0-4.8)  k/uL


 


Monocytes #  0.4   (0-1.0)  k/uL


 


Eosinophils #  0.4   (0-0.7)  k/uL


 


Basophils #  0.1   (0-0.2)  k/uL


 


Sodium   135 L  (137-145)  mmol/L


 


Potassium   8.2 H*  (3.5-5.1)  mmol/L


 


Chloride   103  ()  mmol/L


 


Carbon Dioxide   16 L  (22-30)  mmol/L


 


Anion Gap   16  mmol/L


 


BUN   72 H  (9-20)  mg/dL


 


Creatinine   2.54 H  (0.66-1.25)  mg/dL


 


Est GFR (CKD-EPI)AfAm   26  (>60 ml/min/1.73 sqM)  


 


Est GFR (CKD-EPI)NonAf   23  (>60 ml/min/1.73 sqM)  


 


Glucose   196 H  (74-99)  mg/dL


 


Calcium   8.5  (8.4-10.2)  mg/dL


 


Magnesium   1.8  (1.6-2.3)  mg/dL


 


Total Bilirubin   0.3  (0.2-1.3)  mg/dL


 


AST   24  (17-59)  U/L


 


ALT   20  (4-49)  U/L


 


Alkaline Phosphatase   82  ()  U/L


 


Total Protein   8.0  (6.3-8.2)  g/dL


 


Albumin   4.6  (3.5-5.0)  g/dL














Critical Care Time


Critical Care Time: Yes


Total Critical Care Time: 30





Disposition


Clinical Impression: 


 Acute kidney injury, Hyperkalemia





Disposition: ADMITTED AS IP TO THIS HOSP


Condition: Fair


Referrals: 


[facility] [Primary Care Provider] - 1-2 days


Time of Disposition: 13:13

## 2022-09-22 LAB
ALBUMIN SERPL-MCNC: 3.4 G/DL (ref 3.5–5)
ALP SERPL-CCNC: 68 U/L (ref 38–126)
ALT SERPL-CCNC: 15 U/L (ref 4–49)
ANION GAP SERPL CALC-SCNC: 11 MMOL/L
ANION GAP SERPL CALC-SCNC: 12 MMOL/L
AST SERPL-CCNC: 19 U/L (ref 17–59)
BASOPHILS # BLD AUTO: 0 K/UL (ref 0–0.2)
BASOPHILS NFR BLD AUTO: 1 %
BUN SERPL-SCNC: 58 MG/DL (ref 9–20)
BUN SERPL-SCNC: 68 MG/DL (ref 9–20)
CALCIUM SPEC-MCNC: 7.5 MG/DL (ref 8.4–10.2)
CALCIUM SPEC-MCNC: 8 MG/DL (ref 8.4–10.2)
CHLORIDE SERPL-SCNC: 104 MMOL/L (ref 98–107)
CHLORIDE SERPL-SCNC: 105 MMOL/L (ref 98–107)
CO2 SERPL-SCNC: 21 MMOL/L (ref 22–30)
CO2 SERPL-SCNC: 21 MMOL/L (ref 22–30)
EOSINOPHIL # BLD AUTO: 0.3 K/UL (ref 0–0.7)
EOSINOPHIL NFR BLD AUTO: 6 %
ERYTHROCYTE [DISTWIDTH] IN BLOOD BY AUTOMATED COUNT: 3.18 M/UL (ref 4.3–5.9)
ERYTHROCYTE [DISTWIDTH] IN BLOOD: 13.2 % (ref 11.5–15.5)
GLUCOSE BLD-MCNC: 110 MG/DL (ref 70–110)
GLUCOSE BLD-MCNC: 123 MG/DL (ref 70–110)
GLUCOSE BLD-MCNC: 130 MG/DL (ref 70–110)
GLUCOSE BLD-MCNC: 85 MG/DL (ref 70–110)
GLUCOSE SERPL-MCNC: 127 MG/DL (ref 74–99)
GLUCOSE SERPL-MCNC: 192 MG/DL (ref 74–99)
HCT VFR BLD AUTO: 31.7 % (ref 39–53)
HGB BLD-MCNC: 10 GM/DL (ref 13–17.5)
LYMPHOCYTES # SPEC AUTO: 1.5 K/UL (ref 1–4.8)
LYMPHOCYTES NFR SPEC AUTO: 28 %
MAGNESIUM SPEC-SCNC: 1.6 MG/DL (ref 1.6–2.3)
MCH RBC QN AUTO: 31.4 PG (ref 25–35)
MCHC RBC AUTO-ENTMCNC: 31.4 G/DL (ref 31–37)
MCV RBC AUTO: 99.8 FL (ref 80–100)
MONOCYTES # BLD AUTO: 0.4 K/UL (ref 0–1)
MONOCYTES NFR BLD AUTO: 8 %
NEUTROPHILS # BLD AUTO: 3 K/UL (ref 1.3–7.7)
NEUTROPHILS NFR BLD AUTO: 55 %
PLATELET # BLD AUTO: 198 K/UL (ref 150–450)
POTASSIUM SERPL-SCNC: 5.6 MMOL/L (ref 3.5–5.1)
POTASSIUM SERPL-SCNC: 5.7 MMOL/L (ref 3.5–5.1)
PROT SERPL-MCNC: 6.2 G/DL (ref 6.3–8.2)
SODIUM SERPL-SCNC: 137 MMOL/L (ref 137–145)
SODIUM SERPL-SCNC: 137 MMOL/L (ref 137–145)
WBC # BLD AUTO: 5.5 K/UL (ref 3.8–10.6)

## 2022-09-22 RX ADMIN — INSULIN DETEMIR SCH UNIT: 100 INJECTION, SOLUTION SUBCUTANEOUS at 06:09

## 2022-09-22 RX ADMIN — HEPARIN SODIUM SCH UNIT: 5000 INJECTION INTRAVENOUS; SUBCUTANEOUS at 08:29

## 2022-09-22 RX ADMIN — HEPARIN SODIUM SCH UNIT: 5000 INJECTION INTRAVENOUS; SUBCUTANEOUS at 22:57

## 2022-09-22 RX ADMIN — METOPROLOL TARTRATE SCH MG: 50 TABLET, FILM COATED ORAL at 08:29

## 2022-09-22 RX ADMIN — CEFAZOLIN SCH MLS/HR: 330 INJECTION, POWDER, FOR SOLUTION INTRAMUSCULAR; INTRAVENOUS at 20:00

## 2022-09-22 RX ADMIN — HEPARIN SODIUM SCH UNIT: 5000 INJECTION INTRAVENOUS; SUBCUTANEOUS at 17:01

## 2022-09-22 RX ADMIN — METOPROLOL TARTRATE SCH MG: 50 TABLET, FILM COATED ORAL at 20:00

## 2022-09-22 RX ADMIN — CEFAZOLIN SCH MLS/HR: 330 INJECTION, POWDER, FOR SOLUTION INTRAMUSCULAR; INTRAVENOUS at 08:34

## 2022-09-22 RX ADMIN — ATORVASTATIN CALCIUM SCH MG: 20 TABLET, FILM COATED ORAL at 20:00

## 2022-09-22 RX ADMIN — LEVOTHYROXINE SODIUM SCH MCG: 0.12 TABLET ORAL at 06:09

## 2022-09-22 NOTE — P.PN
Subjective


Progress Note Date: 09/22/22


Patient seen and examined at bedside.  Patient denies chest pain shortness of 

breath nausea vomiting fevers or chills.  Patient's dates he feels good.








Objective





- Vital Signs


Vital signs: 


                                   Vital Signs











Temp  98.2 F   09/22/22 11:56


 


Pulse  60   09/22/22 11:56


 


Resp  16   09/22/22 11:56


 


BP  135/72   09/22/22 11:56


 


Pulse Ox  99   09/22/22 11:56


 


FiO2      








                                 Intake & Output











 09/21/22 09/22/22 09/22/22





 18:59 06:59 18:59


 


Intake Total  240 360


 


Balance  240 360


 


Weight 91.172 kg  


 


Intake:   


 


  Oral  240 360


 


Other:   


 


  Voiding Method  Toilet Toilet


 


  # Voids 0 2 


 


  # Bowel Movements 0  














- Exam





General: [non toxic], [no distress], [appears at stated age]


Derm: [warm], [dry]


Head: [atraumatic], [normocephalic], [symmetric]


Eyes: [EOMI], [no lid lag], [anicteric sclera]


Mouth: [no lip lesion], [mucus membranes moist]


Cardiovascular: [S1S2 reg], [no murmur], [positive posterior tibial pulse 

bilateral], 


Lungs: [CTA bilateral], [no rhonchi, no rales] , [no accessory muscle use]


Abdominal: [soft], [ nontender to palpation], [no guarding], [no appreciable 

organomegaly]


Ext: [no gross muscle atrophy], [no edema], [no contractures]


Neuro: [ CN II-XI grossly intact], [no focal neuro deficits]


Psych: [Alert], [oriented], [appropriate affect] 





- Labs


CBC & Chem 7: 


                                 09/22/22 09:00





                                 09/22/22 09:00


Labs: 


                  Abnormal Lab Results - Last 24 Hours (Table)











  09/21/22 09/21/22 09/21/22 Range/Units





  11:27 19:17 20:21 


 


RBC     (4.30-5.90)  m/uL


 


Hgb     (13.0-17.5)  gm/dL


 


Hct     (39.0-53.0)  %


 


Sodium   136 L   (137-145)  mmol/L


 


Potassium   6.2 H*   (3.5-5.1)  mmol/L


 


Carbon Dioxide   17 L   (22-30)  mmol/L


 


BUN   72 H   (9-20)  mg/dL


 


Creatinine   2.43 H   (0.66-1.25)  mg/dL


 


Glucose   200 H   (74-99)  mg/dL


 


POC Glucose (mg/dL)    181 H  ()  mg/dL


 


Hemoglobin A1c  7.3 H    (0.0-6.0)  %


 


Calcium   8.0 L   (8.4-10.2)  mg/dL


 


Total Bilirubin   0.1 L   (0.2-1.3)  mg/dL


 


Total Protein     (6.3-8.2)  g/dL


 


Albumin     (3.5-5.0)  g/dL














  09/22/22 09/22/22 09/22/22 Range/Units





  00:01 05:54 09:00 


 


RBC    3.18 L  (4.30-5.90)  m/uL


 


Hgb    10.0 L D  (13.0-17.5)  gm/dL


 


Hct    31.7 L  (39.0-53.0)  %


 


Sodium     (137-145)  mmol/L


 


Potassium  5.7 H    (3.5-5.1)  mmol/L


 


Carbon Dioxide  21 L    (22-30)  mmol/L


 


BUN  68 H    (9-20)  mg/dL


 


Creatinine  2.32 H    (0.66-1.25)  mg/dL


 


Glucose  127 H    (74-99)  mg/dL


 


POC Glucose (mg/dL)   123 H   ()  mg/dL


 


Hemoglobin A1c     (0.0-6.0)  %


 


Calcium  8.0 L    (8.4-10.2)  mg/dL


 


Total Bilirubin     (0.2-1.3)  mg/dL


 


Total Protein     (6.3-8.2)  g/dL


 


Albumin     (3.5-5.0)  g/dL














  09/22/22 Range/Units





  09:00 


 


RBC   (4.30-5.90)  m/uL


 


Hgb   (13.0-17.5)  gm/dL


 


Hct   (39.0-53.0)  %


 


Sodium   (137-145)  mmol/L


 


Potassium  5.6 H  (3.5-5.1)  mmol/L


 


Carbon Dioxide  21 L  (22-30)  mmol/L


 


BUN  58 H  (9-20)  mg/dL


 


Creatinine  1.96 H  (0.66-1.25)  mg/dL


 


Glucose  192 H  (74-99)  mg/dL


 


POC Glucose (mg/dL)   ()  mg/dL


 


Hemoglobin A1c   (0.0-6.0)  %


 


Calcium  7.5 L  (8.4-10.2)  mg/dL


 


Total Bilirubin   (0.2-1.3)  mg/dL


 


Total Protein  6.2 L  (6.3-8.2)  g/dL


 


Albumin  3.4 L  (3.5-5.0)  g/dL














Assessment and Plan


Assessment: 





1. Hyperkalemia likely caused by dehydration and medications 


Hold all nephrotoxic agents


Lasix, Metformin, spironolactone, lisinopril all held


Nephrology following


Repeat potassium level was 6.6 from 8.2


Repeat potassium level ordered


IV fluids placed


Telemetry


Vitals every 4





2.  Non-insulin-dependent diabetes mellitus


Hold metformin


Insulin sliding scale with long-acting insulin placed


Hypoglycemic protocol ordered








3.  History of hypertension currently controlled


Diuretics and ACE inhibitor held


Metoprolol continued with parameters placed





4.  History of hyperlipidemia


resume statin





5.  GI DVT prophylaxis





6.  A.m. labs





7.  PT OT





Patient is a DO NOT RESUSCITATE


Admit to medical telemetry


Anticipated discharge the next 24 hours


Greater than 45 minutes spent coordinating care, counseling, and documenting





Time with Patient: Greater than 30

## 2022-09-22 NOTE — P.NPCON
History of Present Illness





- Reason for Consult


acute renal failure





- History of Present Illness





Patient is an 82-year-old male with history of hyperlipidemia.  He was admitted 

from his primary care physician's office due to abnormal labs.


Serum potassium was at 8.2 and equal as per liter.  Serum creatinine 2.5.


Previous creatinine at 1.2 on 03/28/2022.


Patient states that he was recently started on potassium supplementation.  He 

did admit to increase potassium intake in diet as well.  Maintained on ACE 

inhibitor's and Aldactone.





Blood pressure was low with systolic in the 90s on initial admission


Patient denies any nausea vomiting or diarrhea


No history of fever chills or cough.





Patient denies any significant history of kidney diseases although he states 

that his kidney function has been week on and off since he was started on 

diuretics.


No history of use of NSAIDs





Review of Systems





As per HPI





Past Medical History


Past Medical History: Hyperlipidemia, Thyroid Disorder


History of Any Multi-Drug Resistant Organisms: None Reported


Additional Past Surgical History / Comment(s): Thyroidectomy


Past Anesthesia/Blood Transfusion Reactions: No Reported Reaction


Past Psychological History: No Psychological Hx Reported


Smoking Status: Former smoker


Past Alcohol Use History: None Reported


Past Drug Use History: None Reported





- Past Family History


  ** Mother


Family Medical History: Dementia


Additional Family Medical History / Comment(s): alzheimers





  ** Father


Family Medical History: Congestive Heart Failure (CHF)





Medications and Allergies


                                Home Medications











 Medication  Instructions  Recorded  Confirmed  Type


 


Aspirin EC [Ecotrin Low Dose] 81 mg PO Q48H 11/16/21 09/21/22 History


 


Levothyroxine Sodium [Synthroid] 125 mcg PO DAILY 11/16/21 09/21/22 History


 


Simvastatin [Zocor] 40 mg PO HS 11/16/21 09/21/22 History


 


Metoprolol Tartrate [Lopressor] 50 mg PO BID #60 tab 03/28/22 09/21/22 Rx


 


Spironolactone [Aldactone] 25 mg PO DAILY #30 tab 03/28/22 09/21/22 Rx


 


lisinopriL [Zestril] 20 mg PO BID #60 tab 03/28/22 09/21/22 Rx


 


Furosemide [Lasix] 80 mg PO DAILY 09/21/22 09/21/22 History


 


Potassium Chloride ER [K-Dur 20] 20 meq PO DAILY 09/21/22 09/21/22 History


 


metFORMIN HCL [Glucophage] 500 mg PO BID 09/21/22 09/21/22 History








                                    Allergies











Allergy/AdvReac Type Severity Reaction Status Date / Time


 


No Known Allergies Allergy   Verified 09/21/22 13:16














Physical Exam


Vitals: 


                                   Vital Signs











  Temp Pulse Pulse Resp BP BP Pulse Ox


 


 09/22/22 11:56  98.2 F   60  16   135/72  99


 


 09/22/22 11:19    55 L  18   148/65  100


 


 09/22/22 08:25  98.2 F   71  16   120/54  99


 


 09/22/22 08:00  97.6 F   70  17   118/68  100


 


 09/22/22 03:42  98.1 F   63  16   109/56  97


 


 09/21/22 23:53  97.6 F   61  16   119/57  98


 


 09/21/22 20:00  97.8 F   62  18   102/61  99


 


 09/21/22 17:19   64   16  136/85   97


 


 09/21/22 16:40   63   16  139/58   97


 


 09/21/22 16:36   56 L     


 


 09/21/22 16:26   56 L     


 


 09/21/22 16:03   64   16  104/64   98


 


 09/21/22 15:49  98.0 F   87  18   124/77  97


 


 09/21/22 14:00   66   16  110/60   98


 


 09/21/22 12:54   68   16  130/68   98








                                Intake and Output











 09/21/22 09/22/22 09/22/22





 22:59 06:59 14:59


 


Intake Total 240  180


 


Balance 240  180


 


Intake:   


 


  Oral 240  180


 


Other:   


 


  Voiding Method Toilet Toilet Toilet


 


  # Voids 1 2 


 


  # Bowel Movements 0  


 


  Weight 91.172 kg  














Awake, comfortable, not in any acute distress


Examination of the heart S1 and S2


Examination of the lungs bilateral breath sounds are heard


Abdomen is soft nontender


Examination of the lower extremity shows no significant edema


CNS exam grossly intact





Results





- Lab Results


                             Most recent lab results











Calcium  7.5 mg/dL (8.4-10.2)  L  09/22/22  09:00    


 


Phosphorus  3.9 mg/dL (2.5-4.5)   09/22/22  09:00    


 


Magnesium  1.6 mg/dL (1.6-2.3)   09/22/22  09:00    














                                 09/22/22 09:00





                                 09/22/22 09:00





Assessment and Plan


Assessment: 





1.  Acute kidney injury secondary to hypotension, nonoliguric with ischemic ATN 

in the setting of use of ACE inhibitor's.  Currently improving.  Check UA.  

Ultrasound shows no evidence of obstructive uropathy


2.  Hyperkalemia associated with acute kidney injury, recent potassium 

supplementation in the setting of use of ACE inhibitor's and Aldactone.  Rule 

out urine retention.


3.  Metabolic acidosis associated with acute kidney injury currently improved


4.  Type 2 diabetes maintained on metformin as outpatient


5.  Hypertension with blood pressures low on admission


Plan: 





Continue to hold off on ACE inhibitor's and Aldactone.


Continue with IV fluids


Avoid use of Kayexalate, rather we can use lokema if needed for hyperkalemia due

 to associated risk of colonic necrosis with Kayexalate especially in the 

elderly.


Repeat labs in a.m.


Check bladder scan and rule out urine retention.  Next





Thank you for the consultation.  We'll continue to follow the patient with you 

during his hospitalization

## 2022-09-23 VITALS
DIASTOLIC BLOOD PRESSURE: 60 MMHG | HEART RATE: 50 BPM | RESPIRATION RATE: 16 BRPM | TEMPERATURE: 97.9 F | SYSTOLIC BLOOD PRESSURE: 128 MMHG

## 2022-09-23 LAB
ALBUMIN SERPL-MCNC: 3.3 G/DL (ref 3.5–5)
ALP SERPL-CCNC: 71 U/L (ref 38–126)
ALT SERPL-CCNC: 14 U/L (ref 4–49)
ANION GAP SERPL CALC-SCNC: 7 MMOL/L
AST SERPL-CCNC: 19 U/L (ref 17–59)
BASOPHILS # BLD AUTO: 0 K/UL (ref 0–0.2)
BASOPHILS NFR BLD AUTO: 1 %
BUN SERPL-SCNC: 41 MG/DL (ref 9–20)
CALCIUM SPEC-MCNC: 7.3 MG/DL (ref 8.4–10.2)
CHLORIDE SERPL-SCNC: 107 MMOL/L (ref 98–107)
CO2 SERPL-SCNC: 24 MMOL/L (ref 22–30)
EOSINOPHIL # BLD AUTO: 0.3 K/UL (ref 0–0.7)
EOSINOPHIL NFR BLD AUTO: 7 %
ERYTHROCYTE [DISTWIDTH] IN BLOOD BY AUTOMATED COUNT: 3.1 M/UL (ref 4.3–5.9)
ERYTHROCYTE [DISTWIDTH] IN BLOOD: 13.3 % (ref 11.5–15.5)
GLUCOSE BLD-MCNC: 107 MG/DL (ref 70–110)
GLUCOSE BLD-MCNC: 118 MG/DL (ref 70–110)
GLUCOSE SERPL-MCNC: 181 MG/DL (ref 74–99)
HCT VFR BLD AUTO: 31.2 % (ref 39–53)
HGB BLD-MCNC: 9.8 GM/DL (ref 13–17.5)
LYMPHOCYTES # SPEC AUTO: 1.6 K/UL (ref 1–4.8)
LYMPHOCYTES NFR SPEC AUTO: 33 %
MCH RBC QN AUTO: 31.6 PG (ref 25–35)
MCHC RBC AUTO-ENTMCNC: 31.3 G/DL (ref 31–37)
MCV RBC AUTO: 100.8 FL (ref 80–100)
MONOCYTES # BLD AUTO: 0.4 K/UL (ref 0–1)
MONOCYTES NFR BLD AUTO: 8 %
NEUTROPHILS # BLD AUTO: 2.5 K/UL (ref 1.3–7.7)
NEUTROPHILS NFR BLD AUTO: 50 %
PLATELET # BLD AUTO: 192 K/UL (ref 150–450)
POTASSIUM SERPL-SCNC: 5.1 MMOL/L (ref 3.5–5.1)
PROT SERPL-MCNC: 5.9 G/DL (ref 6.3–8.2)
SODIUM SERPL-SCNC: 138 MMOL/L (ref 137–145)
WBC # BLD AUTO: 5 K/UL (ref 3.8–10.6)

## 2022-09-23 RX ADMIN — CEFAZOLIN SCH MLS/HR: 330 INJECTION, POWDER, FOR SOLUTION INTRAMUSCULAR; INTRAVENOUS at 06:12

## 2022-09-23 RX ADMIN — INSULIN DETEMIR SCH UNIT: 100 INJECTION, SOLUTION SUBCUTANEOUS at 06:12

## 2022-09-23 RX ADMIN — METOPROLOL TARTRATE SCH MG: 50 TABLET, FILM COATED ORAL at 08:08

## 2022-09-23 RX ADMIN — LEVOTHYROXINE SODIUM SCH MCG: 0.12 TABLET ORAL at 06:12

## 2022-09-23 RX ADMIN — HEPARIN SODIUM SCH UNIT: 5000 INJECTION INTRAVENOUS; SUBCUTANEOUS at 08:08

## 2022-09-23 NOTE — P.DS
Providers


Date of admission: 


09/21/22 13:14





Attending physician: 


Albina Jimenes MD





Consults: 





                                        





09/21/22 13:14


Consult Physician Urgent 


   Consulting Provider: Promise Agustin


   Consult Reason/Comments: Hyperkalemia, acute kidney injury


   Do you want consulting provider notified?: Yes











Primary care physician: 


[facility] Course: 








Admitting diagnoses:


Abnormal lab





Discharge diagnoses:


Hyperkalemia resolved


MERLINE on  CKD improved


Hypertension controlled


Hyperlipidemia stable


Non-insulin-dependent diabetes mellitus





Hospital course:





This a 82-year-old male presents to [facility] emergency Department 

chief complaint of abnormal labs.  Patient states is at his PCPs office in which

she was told his potassium was elevated.  Patient states that he was on 

potassium supplements states that he was on Lasix also.  Patient has noted that 

he's had some renal dysfunction versus has been evaluated in the past.  Appears 

to be worsening.  Patient states he is currently asymptomatic denies chest pain 

palpitations dizziness nausea vomiting diarrhea constipation or fevers chills no

other associated complaints.  Patient lives alone.  However, his daughter lives 

a short distance away.  She is alert and oriented 4 and states he does not want

to bother his family with his medical issues at this time.  Patient's daughter 

Palma is on vacation in [place] and does not want to worry her.  Patient states he 

is a DO NOT RESUSCITATE.  Patient has no complaints and is sitting in bed 

comfortably.  She denies history of heart failure.  Patient has a past medical 

history of hypertension, hyperlipidemia, renal insufficiency, and 

non-insulin-dependent diabetes mellitus.





Physical exam:


General: [non toxic], [no distress], [appears at stated age]


Derm: [warm], [dry]


Head: [atraumatic], [normocephalic], [symmetric]


Eyes: [EOMI], [no lid lag], [anicteric sclera]


Mouth: [no lip lesion], [mucus membranes moist]


Cardiovascular: [S1S2 reg], [no murmur], [positive posterior tibial pulse 

bilateral], 


Lungs: [CTA bilateral], [no rhonchi, no rales] , [no accessory muscle use]


Abdominal: [soft], [ nontender to palpation], [no guarding], [no appreciable 

organomegaly]


Ext: [no gross muscle atrophy], [no edema], [no contractures]


Neuro: [ CN II-XI grossly intact], [no focal neuro deficits]


Psych: [Alert], [oriented], [appropriate affect] 





Clinical course: 





1. Hyperkalemia likely caused by dehydration and medications 


Hold all nephrotoxic agents


Lasix, Metformin, spironolactone, lisinopril all held during hospital stay


Nephrology closely follow


Patient given insulin, calcium gluconate, Kayexalate


Repeat potassium level today was 5.1


Repeat levels in the next 2-3 days if patient





2.  Non-insulin-dependent diabetes mellitus


Hold metformin


Insulin sliding scale with long-acting insulin placed during hospital stay


Hypoglycemic protocol ordered


Restart metformin as an outpatient








3.  History of hypertension currently controlled


Diuretics and ACE inhibitor held


Metoprolol continued with parameters placed





4.  History of hyperlipidemia


resume statin








Disposition: Home with home care





Activity: As tolerated





Diet: Diabetic





Condition: Fair





Follow-up with PCP in the next 2-3 days





Follow-up with nephrology within 1 week











Patient Condition at Discharge: Fair





Plan - Discharge Summary


Discharge Rx Participant: Yes


New Discharge Prescriptions: 


New


   Metoprolol Tartrate [Lopressor] 50 mg PO BID #0 tab





Continue


   Levothyroxine Sodium [Synthroid] 125 mcg PO DAILY


   Aspirin EC [Ecotrin Low Dose] 81 mg PO Q48H


   metFORMIN HCL [Glucophage] 500 mg PO BID


   Simvastatin [Zocor] 40 mg PO HS


   Metoprolol Tartrate [Lopressor] 50 mg PO BID #60 tab





Discontinued


   Spironolactone [Aldactone] 25 mg PO DAILY #30 tab


   Potassium Chloride ER [K-Dur 20] 20 meq PO DAILY


   Furosemide [Lasix] 80 mg PO DAILY


   lisinopriL [Zestril] 20 mg PO BID #60 tab


Discharge Medication List





Aspirin EC [Ecotrin Low Dose] 81 mg PO Q48H 11/16/21 [History]


Levothyroxine Sodium [Synthroid] 125 mcg PO DAILY 11/16/21 [History]


Simvastatin [Zocor] 40 mg PO HS 11/16/21 [History]


Metoprolol Tartrate [Lopressor] 50 mg PO BID #60 tab 03/28/22 [Rx]


metFORMIN HCL [Glucophage] 500 mg PO BID 09/21/22 [History]


Metoprolol Tartrate [Lopressor] 50 mg PO BID #0 tab 09/23/22 [Rx]








Follow up Appointment(s)/Referral(s): 


[facility] [Primary Care Provider] - 1-2 days


Promise Agustin MD [STAFF PHYSICIAN] - 1 Week


Patient Instructions/Handouts:  Heart Failure (ER), Hyperkalemia (GEN), Acute 

Kidney Injury (GEN)


Discharge Disposition: HOME WITH HOME HEALTH SERVICES

## 2022-09-23 NOTE — P.PN
Subjective





Patient is seen for follow-up for acute kidney injury and hyperkalemia.


Renal function has improved


Serum potassium is down to I 0.1.


Creatinine decreased to 1.7 today from 2.5 on initial admission.


No complaints today








Objective





- Vital Signs


Vital signs: 


                                   Vital Signs











Temp  97.9 F   09/23/22 11:39


 


Pulse  50 L  09/23/22 11:39


 


Resp  16   09/23/22 11:39


 


BP  128/60   09/23/22 11:39


 


Pulse Ox  99   09/23/22 11:39


 


FiO2      








                                 Intake & Output











 09/22/22 09/23/22 09/23/22





 18:59 06:59 18:59


 


Intake Total 540 240 


 


Output Total  300 


 


Balance 540 -60 


 


Intake:   


 


  Oral 540 240 


 


Output:   


 


  Urine  300 


 


Other:   


 


  Voiding Method Toilet Toilet Toilet





  Urinal Urinal


 


  # Voids  2 














- Exam





Patient is awake, comfortable, not in any acute distress


Examination of the heart S1 and S2


Examination of the lungs bilateral breath sounds are heard


Abdomen is soft nontender


Examination of the lower extremities shows no evidence of edema


CNS exam grossly intact





- Labs


CBC & Chem 7: 


                                 09/23/22 08:01





                                 09/23/22 08:01


Labs: 


                  Abnormal Lab Results - Last 24 Hours (Table)











  09/22/22 09/23/22 09/23/22 Range/Units





  20:01 05:46 08:01 


 


RBC    3.10 L  (4.30-5.90)  m/uL


 


Hgb    9.8 L  (13.0-17.5)  gm/dL


 


Hct    31.2 L  (39.0-53.0)  %


 


MCV    100.8 H  (80.0-100.0)  fL


 


BUN     (9-20)  mg/dL


 


Creatinine     (0.66-1.25)  mg/dL


 


Glucose     (74-99)  mg/dL


 


POC Glucose (mg/dL)  130 H  118 H   ()  mg/dL


 


Calcium     (8.4-10.2)  mg/dL


 


Total Protein     (6.3-8.2)  g/dL


 


Albumin     (3.5-5.0)  g/dL














  09/23/22 Range/Units





  08:01 


 


RBC   (4.30-5.90)  m/uL


 


Hgb   (13.0-17.5)  gm/dL


 


Hct   (39.0-53.0)  %


 


MCV   (80.0-100.0)  fL


 


BUN  41 H  (9-20)  mg/dL


 


Creatinine  1.77 H  (0.66-1.25)  mg/dL


 


Glucose  181 H  (74-99)  mg/dL


 


POC Glucose (mg/dL)   ()  mg/dL


 


Calcium  7.3 L  (8.4-10.2)  mg/dL


 


Total Protein  5.9 L  (6.3-8.2)  g/dL


 


Albumin  3.3 L  (3.5-5.0)  g/dL














Assessment and Plan


Assessment: 





1.  Acute kidney injury secondary to hypotension, nonoliguric with ischemic ATN 

in the setting of use of ACE inhibitor's.  Currently improving.  Check UA.  

Ultrasound shows no evidence of obstructive uropathy


2.  Hyperkalemia associated with acute kidney injury, recent potassium lobo

pplementation in the setting of use of ACE inhibitor's and Aldactone.  Rule out 

urine retention.


3.  Metabolic acidosis associated with acute kidney injury currently improved


4.  Type 2 diabetes maintained on metformin as outpatient


5.  Hypertension with blood pressures low on admission


Plan: 





Continue to hold off on ACE inhibitor's and Aldactone.


Patient is stable for discharge


Repeat labs in 2-3 days post discharge


Recommend follow-up for CK D.

## 2022-09-26 NOTE — CDI
Documentation Clarification Form



Date:  9/2622

From: Delicia Leslie

MRN: [id number]

Admit Date: 09/21/2022 01:14:00 PM

Patient Name: Herb Montana

Visit Number: [id number]

Discharge Date:  09/23/2022 02:42:00 PM





ATTENTION: The Clinical Documentation Specialists (CDI) and [facility] Coding Staff 
appreciate your assistance in clarifying documentation. Please respond to the 
clarification below the line at the bottom and electronically sign. The CDI & 
[facility] Coding staff will review the response and follow-up if needed. Please note: 
Queries are made part of the Legal Health Record. If you have any questions, 
please contact the author of this message via ITS.



Dr. Hugo Blair,



Unspecified CKD is documented  in the discharge summary. 



Additional clarification regarding the stage of CKD is requested.



History/Risk Factors:  T2DM w CKD, hyperkalemia caused by dehydration and 
medications. 

Patients Historical BUN/CR/GFR: none available

Clinical Indicators: 

Current BUN:  72, 72, 68, 58, 41

Current CR:  2.54, 2.43, 2.32, 1.96, 1.77

Current GFR:  23, 24, 25, 31, 35

Treatment:  IV fluids, IV calcium gluconate, IV Sod Bicarb

   Consults  Hyperkalemia likely caused by dehydration and medications. Hold all
nephrotoxic agents: 

            Lasix, Metformin, spironolactone, lisinopril all held.

   

Please clarify the stage of the CKD, if known:



[  ] CKD Stage 1 (GFR > 90)

[  ] CKD Stage 2 (GFR 60-89)

[  ] CKD Stage 3 (GFR 30-59)

[  ] CKD Stage 3a (GFR 45-59)

[  ] CKD Stage 3b (GFR 30-44)

[ X ] CKD Stage 4 (GFR 15-29)

[  ] CKD Stage 5 (GFR <15)

[  ] ESRD

[  ] Other, please specify ___________

[  ] Unable to determine

___________________________________________________________________________

MTDD

## 2022-10-07 ENCOUNTER — HOSPITAL ENCOUNTER (INPATIENT)
Dept: HOSPITAL 47 - EC | Age: 82
LOS: 2 days | Discharge: HOME | DRG: 291 | End: 2022-10-09
Attending: INTERNAL MEDICINE | Admitting: INTERNAL MEDICINE
Payer: OTHER GOVERNMENT

## 2022-10-07 VITALS — BODY MASS INDEX: 29.7 KG/M2

## 2022-10-07 DIAGNOSIS — Z79.899: ICD-10-CM

## 2022-10-07 DIAGNOSIS — R00.1: ICD-10-CM

## 2022-10-07 DIAGNOSIS — I49.3: ICD-10-CM

## 2022-10-07 DIAGNOSIS — I27.20: ICD-10-CM

## 2022-10-07 DIAGNOSIS — I13.0: Primary | ICD-10-CM

## 2022-10-07 DIAGNOSIS — Z82.49: ICD-10-CM

## 2022-10-07 DIAGNOSIS — E78.5: ICD-10-CM

## 2022-10-07 DIAGNOSIS — I44.7: ICD-10-CM

## 2022-10-07 DIAGNOSIS — E89.0: ICD-10-CM

## 2022-10-07 DIAGNOSIS — I25.5: ICD-10-CM

## 2022-10-07 DIAGNOSIS — D63.8: ICD-10-CM

## 2022-10-07 DIAGNOSIS — Z87.891: ICD-10-CM

## 2022-10-07 DIAGNOSIS — I50.23: ICD-10-CM

## 2022-10-07 DIAGNOSIS — Z79.890: ICD-10-CM

## 2022-10-07 DIAGNOSIS — N18.30: ICD-10-CM

## 2022-10-07 DIAGNOSIS — Z79.84: ICD-10-CM

## 2022-10-07 DIAGNOSIS — E11.22: ICD-10-CM

## 2022-10-07 DIAGNOSIS — I08.3: ICD-10-CM

## 2022-10-07 DIAGNOSIS — I42.8: ICD-10-CM

## 2022-10-07 LAB
ANION GAP SERPL CALC-SCNC: 11 MMOL/L
APTT BLD: 25.6 SEC (ref 22–30)
BASOPHILS # BLD AUTO: 0.1 K/UL (ref 0–0.2)
BASOPHILS # BLD AUTO: 0.1 K/UL (ref 0–0.2)
BASOPHILS NFR BLD AUTO: 1 %
BASOPHILS NFR BLD AUTO: 1 %
BUN SERPL-SCNC: 24 MG/DL (ref 9–20)
CALCIUM SPEC-MCNC: 8.1 MG/DL (ref 8.4–10.2)
CHLORIDE SERPL-SCNC: 103 MMOL/L (ref 98–107)
CO2 SERPL-SCNC: 24 MMOL/L (ref 22–30)
EOSINOPHIL # BLD AUTO: 0.3 K/UL (ref 0–0.7)
EOSINOPHIL # BLD AUTO: 0.4 K/UL (ref 0–0.7)
EOSINOPHIL NFR BLD AUTO: 5 %
EOSINOPHIL NFR BLD AUTO: 6 %
ERYTHROCYTE [DISTWIDTH] IN BLOOD BY AUTOMATED COUNT: 3.22 M/UL (ref 4.3–5.9)
ERYTHROCYTE [DISTWIDTH] IN BLOOD BY AUTOMATED COUNT: 3.28 M/UL (ref 4.3–5.9)
ERYTHROCYTE [DISTWIDTH] IN BLOOD: 13.4 % (ref 11.5–15.5)
ERYTHROCYTE [DISTWIDTH] IN BLOOD: 13.4 % (ref 11.5–15.5)
GLUCOSE BLD-MCNC: 107 MG/DL (ref 70–110)
GLUCOSE BLD-MCNC: 116 MG/DL (ref 70–110)
GLUCOSE BLD-MCNC: 135 MG/DL (ref 70–110)
GLUCOSE SERPL-MCNC: 135 MG/DL (ref 74–99)
HCT VFR BLD AUTO: 31.1 % (ref 39–53)
HCT VFR BLD AUTO: 32.1 % (ref 39–53)
HGB BLD-MCNC: 10.2 GM/DL (ref 13–17.5)
HGB BLD-MCNC: 10.3 GM/DL (ref 13–17.5)
INR PPP: 1 (ref ?–1.2)
LYMPHOCYTES # SPEC AUTO: 1.8 K/UL (ref 1–4.8)
LYMPHOCYTES # SPEC AUTO: 2.5 K/UL (ref 1–4.8)
LYMPHOCYTES NFR SPEC AUTO: 28 %
LYMPHOCYTES NFR SPEC AUTO: 34 %
MCH RBC QN AUTO: 31.3 PG (ref 25–35)
MCH RBC QN AUTO: 31.8 PG (ref 25–35)
MCHC RBC AUTO-ENTMCNC: 32 G/DL (ref 31–37)
MCHC RBC AUTO-ENTMCNC: 32.9 G/DL (ref 31–37)
MCV RBC AUTO: 96.8 FL (ref 80–100)
MCV RBC AUTO: 97.9 FL (ref 80–100)
MONOCYTES # BLD AUTO: 0.6 K/UL (ref 0–1)
MONOCYTES # BLD AUTO: 0.7 K/UL (ref 0–1)
MONOCYTES NFR BLD AUTO: 10 %
MONOCYTES NFR BLD AUTO: 8 %
NEUTROPHILS # BLD AUTO: 3.5 K/UL (ref 1.3–7.7)
NEUTROPHILS # BLD AUTO: 3.6 K/UL (ref 1.3–7.7)
NEUTROPHILS NFR BLD AUTO: 48 %
NEUTROPHILS NFR BLD AUTO: 54 %
PLATELET # BLD AUTO: 321 K/UL (ref 150–450)
PLATELET # BLD AUTO: 349 K/UL (ref 150–450)
POTASSIUM SERPL-SCNC: 4.6 MMOL/L (ref 3.5–5.1)
PT BLD: 10.8 SEC (ref 9–12)
SODIUM SERPL-SCNC: 138 MMOL/L (ref 137–145)
WBC # BLD AUTO: 6.6 K/UL (ref 3.8–10.6)
WBC # BLD AUTO: 7.2 K/UL (ref 3.8–10.6)

## 2022-10-07 PROCEDURE — 80053 COMPREHEN METABOLIC PANEL: CPT

## 2022-10-07 PROCEDURE — 85025 COMPLETE CBC W/AUTO DIFF WBC: CPT

## 2022-10-07 PROCEDURE — 85610 PROTHROMBIN TIME: CPT

## 2022-10-07 PROCEDURE — 71046 X-RAY EXAM CHEST 2 VIEWS: CPT

## 2022-10-07 PROCEDURE — 99285 EMERGENCY DEPT VISIT HI MDM: CPT

## 2022-10-07 PROCEDURE — 85027 COMPLETE CBC AUTOMATED: CPT

## 2022-10-07 PROCEDURE — 96365 THER/PROPH/DIAG IV INF INIT: CPT

## 2022-10-07 PROCEDURE — 93005 ELECTROCARDIOGRAM TRACING: CPT

## 2022-10-07 PROCEDURE — 83880 ASSAY OF NATRIURETIC PEPTIDE: CPT

## 2022-10-07 PROCEDURE — 85379 FIBRIN DEGRADATION QUANT: CPT

## 2022-10-07 PROCEDURE — 96375 TX/PRO/DX INJ NEW DRUG ADDON: CPT

## 2022-10-07 PROCEDURE — 93306 TTE W/DOPPLER COMPLETE: CPT

## 2022-10-07 PROCEDURE — 85730 THROMBOPLASTIN TIME PARTIAL: CPT

## 2022-10-07 PROCEDURE — 96372 THER/PROPH/DIAG INJ SC/IM: CPT

## 2022-10-07 PROCEDURE — 96366 THER/PROPH/DIAG IV INF ADDON: CPT

## 2022-10-07 PROCEDURE — 84484 ASSAY OF TROPONIN QUANT: CPT

## 2022-10-07 PROCEDURE — 36415 COLL VENOUS BLD VENIPUNCTURE: CPT

## 2022-10-07 PROCEDURE — 78582 LUNG VENTILAT&PERFUS IMAGING: CPT

## 2022-10-07 PROCEDURE — 83735 ASSAY OF MAGNESIUM: CPT

## 2022-10-07 PROCEDURE — B246ZZ4 ULTRASONOGRAPHY OF RIGHT AND LEFT HEART, TRANSESOPHAGEAL: ICD-10-PCS

## 2022-10-07 PROCEDURE — 80048 BASIC METABOLIC PNL TOTAL CA: CPT

## 2022-10-07 RX ADMIN — ATORVASTATIN CALCIUM SCH MG: 20 TABLET, FILM COATED ORAL at 20:30

## 2022-10-07 RX ADMIN — INSULIN ASPART SCH: 100 INJECTION, SOLUTION INTRAVENOUS; SUBCUTANEOUS at 17:57

## 2022-10-07 RX ADMIN — METOPROLOL TARTRATE SCH MG: 50 TABLET, FILM COATED ORAL at 08:27

## 2022-10-07 RX ADMIN — INSULIN ASPART SCH: 100 INJECTION, SOLUTION INTRAVENOUS; SUBCUTANEOUS at 08:22

## 2022-10-07 RX ADMIN — METOPROLOL TARTRATE SCH MG: 50 TABLET, FILM COATED ORAL at 20:31

## 2022-10-07 RX ADMIN — LOSARTAN POTASSIUM SCH MG: 25 TABLET, FILM COATED ORAL at 15:29

## 2022-10-07 RX ADMIN — FUROSEMIDE SCH MG: 10 INJECTION, SOLUTION INTRAMUSCULAR; INTRAVENOUS at 20:30

## 2022-10-07 RX ADMIN — INSULIN ASPART SCH: 100 INJECTION, SOLUTION INTRAVENOUS; SUBCUTANEOUS at 14:29

## 2022-10-07 RX ADMIN — LEVOTHYROXINE SODIUM SCH MCG: 0.12 TABLET ORAL at 08:27

## 2022-10-07 RX ADMIN — INSULIN ASPART SCH: 100 INJECTION, SOLUTION INTRAVENOUS; SUBCUTANEOUS at 20:27

## 2022-10-07 NOTE — ED
General Adult HPI





- General


Chief complaint: Shortness of Breath


Stated complaint: SOB


Time Seen by Provider: 10/07/22 02:04


Source: patient


Mode of arrival: ambulatory





- History of Present Illness


Initial comments: 


Dictation was produced using dragon dictation software. please excuse any 

grammatical, word or spelling errors. 











Chief Complaint: 82-year-old male with recent admission for hyperkalemia 

presents emergency department for dyspnea





History of Present Illness: 82-year-old male who has past medical history 

dyslipidemia, hypertension.  Presents emergency department for 3-5 days of 

shortness of breath.  Patient states that he feels short of breath whenever he 

tries to exert himself or bend forward.  Denies any cough.  No chest pain.  

Patient denies any lower extremity symptoms.  Patient was recently discharged to

the hospital for hyperkalemia about 2 weeks ago.  At that time he had baseline 

labs drawn which when the hyperkalemia was discovered.  The timing is 

asymptomatic.  Patient has any fever constitutional symptoms.








The ROS documented in this emergency department record has been reviewed and 

confirmed by me.  Those systems with pertinent positive or negative responses 

have been documented in the HPI.  All other systems are other negative and/or 

noncontributory.








PHYSICAL EXAM:


General Impression: Alert and oriented x3, not in acute distress


HEENT: Normocephalic atraumatic, extra-ocular movements intact, pupils equal and

reactive to light bilaterally, mucous membranes moist.


Cardiovascular: Heart regular rate and rhythm


Chest: Able to complete full sentences, no retractions, no tachypnea, clear to 

auscultation bilaterally


Abdomen: abdomen soft, non-tender, non-distended, no organomegaly


Musculoskeletal: Pulses present and equal in all extremities, no peripheral 

edema


Motor:  no focal deficits noted


Neurological: CN II-XII grossly intact, no focal motor or sensory deficits noted


Skin: Intact with no visualized rashes


Psych: Normal affect and mood





ED course: 82-year-old male presents to the emergency department for dyspnea.  

On arrival are within acceptable limits.  Patient does not appear dyspneic.  He 

is not hypoxic.  His lung exam is unremarkable.  Clear to auscultation.  EKG 

appears to be baseline.





Laboratory evaluation obtained.  CBC unremarkable.  Metabolic panel within 

acceptable limits.  Troponin is 0.034.  Patient is a history of troponin leak.  

Prematurity peptide is significantly elevated at 15,000.  Patient has history of

elevated BNP though not this high.  Chest x-ray is unremarkable for any acute 

processes.  Patient reevaluated bedside at 3:00 AM.  Does not appear winded 

until he starts to exert himself.  Patient would benefit from observation 

admission for gentle diuresis.  Patient given dose of Lasix.  Patient will be 

admitted to [facility] physician group.





EKG interpretation: Ventricular rate 59, sinus bradycardia,.  Interval 186, QS 

149, .. No MT prolongation, no QTC prolongation, no ST or T-wave changes 

noted.  EKG compared to the tender 21st 2022 showing no changes.  Overall, this 

EKG is unremarkable











- Related Data


                                Home Medications











 Medication  Instructions  Recorded  Confirmed


 


Aspirin EC [Ecotrin Low Dose] 81 mg PO Q48H 11/16/21 09/21/22


 


Levothyroxine Sodium [Synthroid] 125 mcg PO DAILY 11/16/21 09/21/22


 


Simvastatin [Zocor] 40 mg PO HS 11/16/21 09/21/22


 


metFORMIN HCL [Glucophage] 500 mg PO BID 09/21/22 09/21/22








                                  Previous Rx's











 Medication  Instructions  Recorded


 


Metoprolol Tartrate [Lopressor] 50 mg PO BID #60 tab 03/28/22


 


Metoprolol Tartrate [Lopressor] 50 mg PO BID #0 tab 09/23/22











                                    Allergies











Allergy/AdvReac Type Severity Reaction Status Date / Time


 


No Known Allergies Allergy   Verified 10/07/22 01:56














Review of Systems


ROS Statement: 


Those systems with pertinent positive or pertinent negative responses have been 

documented in the HPI.





ROS Other: All systems not noted in ROS Statement are negative.





Past Medical History


Past Medical History: Hyperlipidemia, Thyroid Disorder


Additional Past Medical History / Comment(s): abnormal K+


History of Any Multi-Drug Resistant Organisms: None Reported


Additional Past Surgical History / Comment(s): Thyroidectomy


Past Anesthesia/Blood Transfusion Reactions: No Reported Reaction


Past Psychological History: No Psychological Hx Reported


Smoking Status: Former smoker


Past Alcohol Use History: None Reported


Past Drug Use History: None Reported





- Past Family History


  ** Mother


Family Medical History: Dementia


Additional Family Medical History / Comment(s): alzheimers





  ** Father


Family Medical History: Congestive Heart Failure (CHF)





Course


                                   Vital Signs











  10/07/22 10/07/22





  01:54 02:21


 


Temperature 98.1 F 


 


Pulse Rate 58 L 


 


Respiratory 19 16





Rate  


 


Blood Pressure 174/66 


 


O2 Sat by Pulse 98 





Oximetry  














Medical Decision Making





- Lab Data


Result diagrams: 


                                 10/07/22 02:15





                                 10/07/22 02:15


                                   Lab Results











  10/07/22 10/07/22 10/07/22 Range/Units





  02:15 02:15 02:15 


 


WBC  7.2    (3.8-10.6)  k/uL


 


RBC  3.22 L    (4.30-5.90)  m/uL


 


Hgb  10.2 L    (13.0-17.5)  gm/dL


 


Hct  31.1 L    (39.0-53.0)  %


 


MCV  96.8    (80.0-100.0)  fL


 


MCH  31.8    (25.0-35.0)  pg


 


MCHC  32.9    (31.0-37.0)  g/dL


 


RDW  13.4    (11.5-15.5)  %


 


Plt Count  321    (150-450)  k/uL


 


MPV  7.9    


 


Neutrophils %  48    %


 


Lymphocytes %  34    %


 


Monocytes %  8    %


 


Eosinophils %  6    %


 


Basophils %  1    %


 


Neutrophils #  3.5    (1.3-7.7)  k/uL


 


Lymphocytes #  2.5    (1.0-4.8)  k/uL


 


Monocytes #  0.6    (0-1.0)  k/uL


 


Eosinophils #  0.4    (0-0.7)  k/uL


 


Basophils #  0.1    (0-0.2)  k/uL


 


Hypochromasia  Slight    


 


Sodium   138   (137-145)  mmol/L


 


Potassium   4.6   (3.5-5.1)  mmol/L


 


Chloride   103   ()  mmol/L


 


Carbon Dioxide   24   (22-30)  mmol/L


 


Anion Gap   11   mmol/L


 


BUN   24 H   (9-20)  mg/dL


 


Creatinine   1.45 H   (0.66-1.25)  mg/dL


 


Est GFR (CKD-EPI)AfAm   52   (>60 ml/min/1.73 sqM)  


 


Est GFR (CKD-EPI)NonAf   45   (>60 ml/min/1.73 sqM)  


 


Glucose   135 H   (74-99)  mg/dL


 


Calcium   8.1 L   (8.4-10.2)  mg/dL


 


Troponin I    0.034  (0.000-0.034)  ng/mL


 


NT-Pro-B Natriuret Pep     pg/mL














  10/07/22 Range/Units





  02:15 


 


WBC   (3.8-10.6)  k/uL


 


RBC   (4.30-5.90)  m/uL


 


Hgb   (13.0-17.5)  gm/dL


 


Hct   (39.0-53.0)  %


 


MCV   (80.0-100.0)  fL


 


MCH   (25.0-35.0)  pg


 


MCHC   (31.0-37.0)  g/dL


 


RDW   (11.5-15.5)  %


 


Plt Count   (150-450)  k/uL


 


MPV   


 


Neutrophils %   %


 


Lymphocytes %   %


 


Monocytes %   %


 


Eosinophils %   %


 


Basophils %   %


 


Neutrophils #   (1.3-7.7)  k/uL


 


Lymphocytes #   (1.0-4.8)  k/uL


 


Monocytes #   (0-1.0)  k/uL


 


Eosinophils #   (0-0.7)  k/uL


 


Basophils #   (0-0.2)  k/uL


 


Hypochromasia   


 


Sodium   (137-145)  mmol/L


 


Potassium   (3.5-5.1)  mmol/L


 


Chloride   ()  mmol/L


 


Carbon Dioxide   (22-30)  mmol/L


 


Anion Gap   mmol/L


 


BUN   (9-20)  mg/dL


 


Creatinine   (0.66-1.25)  mg/dL


 


Est GFR (CKD-EPI)AfAm   (>60 ml/min/1.73 sqM)  


 


Est GFR (CKD-EPI)NonAf   (>60 ml/min/1.73 sqM)  


 


Glucose   (74-99)  mg/dL


 


Calcium   (8.4-10.2)  mg/dL


 


Troponin I   (0.000-0.034)  ng/mL


 


NT-Pro-B Natriuret Pep  48371  pg/mL














Disposition


Clinical Impression: 


 Heart failure





Disposition: ADMITTED AS IP TO THIS HOSP


Condition: Fair


Referrals: 


[facility] [Primary Care Provider] - 1-2 days


Decision Time: 03:05

## 2022-10-07 NOTE — XR
EXAMINATION TYPE: XR chest 2V

 

DATE OF EXAM: 10/6/2022

 

COMPARISON: 3/25/2022

 

HISTORY: Pain

 

TECHNIQUE: 2 view

 

FINDINGS: There is bilateral patchy areas of pulmonary infiltrate. There is thickening along the righ
t major fissure. No heart failure seen. Heart size is normal.

 

IMPRESSION: There is chronic pleural thickening and pulmonary infiltrates not significantly different
 than old exam. No evidence of any new infiltrate. No obvious heart failure.

## 2022-10-07 NOTE — P.PN
Subjective


Progress Note Date: 10/07/22





Hospital course:





Patient is a very pleasant 82-year-old male with a past medical history of 

congestive heart failure, hypertension, hyperlipidemia, non-insulin-dependent 

diabetes mellitus, chronic kidney disease stage III, anemia of chronic disease, 

and hypothyroidism.  He presented to the emergency department with reports of 

progressively worsening shortness of breath 5 days.  Patient reports 

significant exertional dyspnea with any activity such as walking to the restroom

accompanied by orthopnea resulting in him having to sleep upright in his chair. 

The emergency department, patient underwent full evaluation.  CBC revealed mild 

normocytic anemia with hemoglobin stable at 10.2 at baseline levels.  BMP 

consistent with stage III chronic kidney disease with BUN of 24, creatinine 

1.45, GFR 45.  Troponin 0.034 and pro-BMP 15,000.  EKG completed revealing sinus

bradycardia at 59 bpm with frequent PVCs.  Chest x-ray revealing chronic pleural

thickening and pulmonary infiltrates.





Physical exam:





Vital signs reviewed and stable. 


General: Nontoxic, no distress and appears stated age.  


Derm: Skin warm and dry, normal coloration for ethnicity.


Head: Atraumatic, normocephalic and symmetric.  


Eyes: EOMs intact, no lid lag, and anicteric sclera


Mouth: no lip lesions, mucus membranes moist


Cardiovascular: regular rate and rhythm with normal S1S2, systolic murmur, 

positive posterior tibial pulses bilaterally, and cap refill < 2 seconds.  


Lungs: Respirations even, regular, and unlabored on room air. Lungs bibasilar 

crackles, no rhonchi, no rales, no wheezing, and no accessory muscle usage. 


Abdominal: soft, nontender to palpation, no guarding, no appreciable organom

egaly


Ext: ROM intact. No gross muscle atrophy, 1+ pitting edema, no contractures


Neuro: Speech clear, face symmetrical and CN II-XII grossly intact with no noted

focal neuro deficits


Psych: Alert and oriented to person, place, time, and situation. Appropriate and

pleasant affect. 





Assessment and Plan of Care:





Acute mild congestive heart failure exacerbation


Exertional dyspnea


Orthopnea


-Cardiology consulted, appreciate recommendations.


-Telemetry monitoring


-ProBNP 15,000


-Daily weights


-Close monitoring of I's and O's


-Cardiac diet


-Lasix 40 mg IVP every 12 hours


-Continuation of daily medications including: Aspirin, atorvastatin, and 

metoprolol


-Continued close monitoring of electrolytes while diuresing. 


-Echocardiogram





CODE STATUS: Full code


DVT prophylaxis: Heparin


Discussed with: Patient and RN


Anticipated discharge date: Clinical course to determine


Anticipated discharge place: Home


A total of 38 minutes was spent on the care of this complex patient more than 

50% of the time was spent in counseling and care coordination.











Objective





- Vital Signs


Vital signs: 


                                   Vital Signs











Temp  98.1 F   10/07/22 01:54


 


Pulse  50 L  10/07/22 07:25


 


Resp  26 H  10/07/22 07:25


 


BP  141/71   10/07/22 07:25


 


Pulse Ox  20 L  10/07/22 07:25


 


FiO2      








                                 Intake & Output











 10/06/22 10/07/22 10/07/22





 18:59 06:59 18:59


 


Weight  90.718 kg 














- Labs


CBC & Chem 7: 


                                 10/07/22 10:28





                                 10/07/22 02:15


Labs: 


                  Abnormal Lab Results - Last 24 Hours (Table)











  10/07/22 10/07/22 10/07/22 Range/Units





  02:15 02:15 02:15 


 


RBC  3.22 L    (4.30-5.90)  m/uL


 


Hgb  10.2 L    (13.0-17.5)  gm/dL


 


Hct  31.1 L    (39.0-53.0)  %


 


D-Dimer    1.72 H  (<0.60)  mg/L FEU


 


BUN   24 H   (9-20)  mg/dL


 


Creatinine   1.45 H   (0.66-1.25)  mg/dL


 


Glucose   135 H   (74-99)  mg/dL


 


POC Glucose (mg/dL)     ()  mg/dL


 


Calcium   8.1 L   (8.4-10.2)  mg/dL














  10/07/22 Range/Units





  07:52 


 


RBC   (4.30-5.90)  m/uL


 


Hgb   (13.0-17.5)  gm/dL


 


Hct   (39.0-53.0)  %


 


D-Dimer   (<0.60)  mg/L FEU


 


BUN   (9-20)  mg/dL


 


Creatinine   (0.66-1.25)  mg/dL


 


Glucose   (74-99)  mg/dL


 


POC Glucose (mg/dL)  135 H  ()  mg/dL


 


Calcium   (8.4-10.2)  mg/dL

## 2022-10-07 NOTE — P.HPIM
History of Present Illness


H&P Date: 10/07/22


Chief Complaint: exertional SOB





82 year old male with CHF , hypertension , DM 





patient comes in due to 5 day history of progressive exertional dyspnea, and 

today he was short of breath at rest. he reports orthopnea and PNDs. and getting

SOB by just walking to the bathroom. he denies any leg swelling, coughing, 

fever, chills, chest pain , abd pain , nausea or vomting , denies any changes in

bowel or urinary habits. 





he was recently hospitalized for MERLINE and hyperkalemia. he was discharged on 9/23

to home, he lives alone, independent , still drives and take care of himself 





he feels better at time of my evaluation , he can rest in the bed with the head 

up, without having SOB. however, when he tried to walk to the bathroom while in 

the ED, he got SOB quickly . 





he claims to be compliant with meds, but he is not currently on diuretics. he 

also is trying to drink a lot of water while at home, to help the kidneys 

recover





workup inthe ED showed stable CKD, and chronic anemia . 








Review of Systems





 








Pertinent positives as noted in HPI. All other systems were reviewed and are 

negative 





Past Medical History


Past Medical History: Diabetes Mellitus, Hyperlipidemia, Thyroid Disorder


Additional Past Medical History / Comment(s): abnormal K+


History of Any Multi-Drug Resistant Organisms: None Reported


Additional Past Surgical History / Comment(s): Thyroidectomy


Past Anesthesia/Blood Transfusion Reactions: No Reported Reaction


Past Psychological History: No Psychological Hx Reported


Smoking Status: Former smoker


Past Alcohol Use History: None Reported


Past Drug Use History: None Reported





- Past Family History


  ** Mother


Family Medical History: Dementia


Additional Family Medical History / Comment(s): alzheimers





  ** Father


Family Medical History: Congestive Heart Failure (CHF)





Medications and Allergies


                                Home Medications











 Medication  Instructions  Recorded  Confirmed  Type


 


Aspirin EC [Ecotrin Low Dose] 81 mg PO Q48H 11/16/21 09/21/22 History


 


Levothyroxine Sodium [Synthroid] 125 mcg PO DAILY 11/16/21 09/21/22 History


 


Simvastatin [Zocor] 40 mg PO HS 11/16/21 09/21/22 History


 


Metoprolol Tartrate [Lopressor] 50 mg PO BID #60 tab 03/28/22 09/21/22 Rx


 


metFORMIN HCL [Glucophage] 500 mg PO BID 09/21/22 09/21/22 History


 


Metoprolol Tartrate [Lopressor] 50 mg PO BID #0 tab 09/23/22  Rx








                                    Allergies











Allergy/AdvReac Type Severity Reaction Status Date / Time


 


No Known Allergies Allergy   Verified 10/07/22 01:56














Physical Exam


Vitals: 


                                   Vital Signs











  Temp Pulse Resp BP Pulse Ox


 


 10/07/22 02:21    16  


 


 10/07/22 01:54  98.1 F  58 L  19  174/66  98








                                Intake and Output











 10/06/22 10/06/22 10/07/22





 14:59 22:59 06:59


 


Other:   


 


  Weight   90.718 kg














Constitutional:          No acute distress, conversant, pleasant


Eyes:      Anicteric sclerae, moist conjunctiva, 


         Pupils equal round reactive to light





ENMT:      NC/AT


         Oropharynx clear, no erythema, or exudates





Neck:      Supple  no masses, or JVD


         No carotid bruits


         No thyromegaly





Lungs:      Decreased breath sounds at lung bases with inspiratory rales b

ilaterally


         Clear to percussion


         Normal respiratory effort, no accessory muscle use 





Cardiovascular:      Heart regular in rate and rhythm, 


         No murmurs, gallops, or rubs


         No peripheral edema





Abdominal:       Soft


         Nontender, no guarding, rebound or rigidity


         Abdomen moving with respiration


         Normoactive bowel sounds


         No hepatomegaly, No splenomegaly


         No palpable mass 


         No abdominal wall hernia noted 





Skin:      Normal temperature, tone, texture, turgor


         No induration


         No subcutaneous nodules 


         No rash, lesions


         No ulcers





Extremities:      No digital cyanosis 


         No clubbing


         Pedal pulses intact and symmetrical


         Radial pulses intact and symmetrical 


         No calf tenderness 





Psychiatric:      Alert and oriented to person, place and time


         Appropriate affect


         fair judgement   


      


Neuro      Muscles Strength 5/5 in all 4 extremities 


         Sensation to light touch grossly present throughout


         Cranial nerves II-XII grossly intact


         No focal sensory deficits


Lymphatics:       no palpable cervical or supraclavicular , or inguinal lymph 

nodes  





Results


CBC & Chem 7: 


                                 10/07/22 02:15





                                 10/07/22 02:15


Labs: 


                  Abnormal Lab Results - Last 24 Hours (Table)











  10/07/22 10/07/22 10/07/22 Range/Units





  02:15 02:15 02:15 


 


RBC  3.22 L    (4.30-5.90)  m/uL


 


Hgb  10.2 L    (13.0-17.5)  gm/dL


 


Hct  31.1 L    (39.0-53.0)  %


 


D-Dimer    1.72 H  (<0.60)  mg/L FEU


 


BUN   24 H   (9-20)  mg/dL


 


Creatinine   1.45 H   (0.66-1.25)  mg/dL


 


Glucose   135 H   (74-99)  mg/dL


 


Calcium   8.1 L   (8.4-10.2)  mg/dL














Assessment and Plan


Assessment: 





Acute mild CHF exacerbation


Patient counseled regarding fluid restrictions 2 L per day


Initiated on IV Lasix


Daily weights


Check ambulatory oxygen saturation


Resume metoprolol and lisinopril


Continue with by mouth diuresis


Cardiac monitor


Monitor vital signs


Cardiology consult


Most recent left ventricular ejection fraction 40%





Chronic conditions


Chronic anemia secondary to chronic disease stable denies any bleeding


CK D stage III stable


Diabetes mellitus continue with insulin sliding scale


Hypothyroidism continue levothyroxine





dvt prophylaxis heparin subcu 3 times a day


Full code

## 2022-10-07 NOTE — NM
EXAMINATION TYPE: NM pul vent and perfuse

 

DATE OF EXAM: 10/7/2022

 

COMPARISON: None

 

HISTORY: Shortness of breath

 

TECHNIQUE:  Utilizing inhalation of 38.4 mCi Tc 99m DTPA aerosol and intravenous injection of 4.7 mCi
 of Tc 99m MAA, ventilation and perfusion images are acquired post injection in multiple projections.


 

FINDINGS: 

There are multiple scattered matched perfusion and ventilation defects throughout both lungs. There a
re no large mismatch perfusion defects.

 

IMPRESSION:

 

Low probability for pulmonary embolism.

## 2022-10-08 LAB
ANION GAP SERPL CALC-SCNC: 11 MMOL/L
APTT BLD: 28.7 SEC (ref 22–30)
BASOPHILS # BLD AUTO: 0 K/UL (ref 0–0.2)
BASOPHILS NFR BLD AUTO: 1 %
BUN SERPL-SCNC: 29 MG/DL (ref 9–20)
CALCIUM SPEC-MCNC: 7.9 MG/DL (ref 8.4–10.2)
CHLORIDE SERPL-SCNC: 97 MMOL/L (ref 98–107)
CO2 SERPL-SCNC: 28 MMOL/L (ref 22–30)
EOSINOPHIL # BLD AUTO: 0.4 K/UL (ref 0–0.7)
EOSINOPHIL NFR BLD AUTO: 6 %
ERYTHROCYTE [DISTWIDTH] IN BLOOD BY AUTOMATED COUNT: 3.13 M/UL (ref 4.3–5.9)
ERYTHROCYTE [DISTWIDTH] IN BLOOD: 13.5 % (ref 11.5–15.5)
GLUCOSE BLD-MCNC: 116 MG/DL (ref 70–110)
GLUCOSE BLD-MCNC: 138 MG/DL (ref 70–110)
GLUCOSE BLD-MCNC: 146 MG/DL (ref 70–110)
GLUCOSE BLD-MCNC: 153 MG/DL (ref 70–110)
GLUCOSE SERPL-MCNC: 277 MG/DL (ref 74–99)
HCT VFR BLD AUTO: 31.2 % (ref 39–53)
HGB BLD-MCNC: 9.8 GM/DL (ref 13–17.5)
INR PPP: 1 (ref ?–1.2)
LYMPHOCYTES # SPEC AUTO: 2 K/UL (ref 1–4.8)
LYMPHOCYTES NFR SPEC AUTO: 30 %
MCH RBC QN AUTO: 31.1 PG (ref 25–35)
MCHC RBC AUTO-ENTMCNC: 31.3 G/DL (ref 31–37)
MCV RBC AUTO: 99.6 FL (ref 80–100)
MONOCYTES # BLD AUTO: 0.5 K/UL (ref 0–1)
MONOCYTES NFR BLD AUTO: 8 %
NEUTROPHILS # BLD AUTO: 3.5 K/UL (ref 1.3–7.7)
NEUTROPHILS NFR BLD AUTO: 54 %
PLATELET # BLD AUTO: 332 K/UL (ref 150–450)
POTASSIUM SERPL-SCNC: 4.9 MMOL/L (ref 3.5–5.1)
PT BLD: 10.8 SEC (ref 9–12)
SODIUM SERPL-SCNC: 136 MMOL/L (ref 137–145)
WBC # BLD AUTO: 6.5 K/UL (ref 3.8–10.6)

## 2022-10-08 RX ADMIN — ASPIRIN 81 MG CHEWABLE TABLET SCH MG: 81 TABLET CHEWABLE at 09:42

## 2022-10-08 RX ADMIN — INSULIN ASPART SCH: 100 INJECTION, SOLUTION INTRAVENOUS; SUBCUTANEOUS at 17:16

## 2022-10-08 RX ADMIN — FUROSEMIDE SCH MG: 10 INJECTION, SOLUTION INTRAMUSCULAR; INTRAVENOUS at 09:42

## 2022-10-08 RX ADMIN — FUROSEMIDE SCH MG: 10 INJECTION, SOLUTION INTRAMUSCULAR; INTRAVENOUS at 20:27

## 2022-10-08 RX ADMIN — METOPROLOL TARTRATE SCH: 50 TABLET, FILM COATED ORAL at 20:22

## 2022-10-08 RX ADMIN — INSULIN ASPART SCH: 100 INJECTION, SOLUTION INTRAVENOUS; SUBCUTANEOUS at 12:37

## 2022-10-08 RX ADMIN — INSULIN ASPART SCH: 100 INJECTION, SOLUTION INTRAVENOUS; SUBCUTANEOUS at 06:32

## 2022-10-08 RX ADMIN — ATORVASTATIN CALCIUM SCH MG: 20 TABLET, FILM COATED ORAL at 20:28

## 2022-10-08 RX ADMIN — LEVOTHYROXINE SODIUM SCH MCG: 0.12 TABLET ORAL at 05:54

## 2022-10-08 RX ADMIN — INSULIN ASPART SCH UNIT: 100 INJECTION, SOLUTION INTRAVENOUS; SUBCUTANEOUS at 20:27

## 2022-10-08 RX ADMIN — METOPROLOL TARTRATE SCH MG: 50 TABLET, FILM COATED ORAL at 09:42

## 2022-10-08 RX ADMIN — LOSARTAN POTASSIUM SCH MG: 25 TABLET, FILM COATED ORAL at 09:42

## 2022-10-08 NOTE — CA
Transthoracic Echo Report 

 Name: Herb Montana 

 MRN:    [id number] 

 Age:    82     Gender:     M 

 

 :    1940 

 Exam Date:     10/07/2022 13:33 

 Exam Location: [facility] 

 Ht (in):     70     Wt (lb):     200 

 Ordering Physician:        Polly Lombardo 

 Attending/Referring Phys:         [id number], Grupo 

 Technician         Darcie Bernal, DUNIA 

 Procedure CPT: 

 Indications:       LV function 

 

 Cardiac Hx: 

 Technical Quality:      Good 

 Contrast 1:                                Total Dose (mL): 

 Contrast 2:                                Total Dose (mL): 

 

 MEASUREMENTS  (Male / Female) Normal Values 

 2D ECHO 

 LV Diastolic Diameter PLAX        5.0 cm                4.2 - 5.9 / 3.9 - 5.3 cm 

 LV Systolic Diameter PLAX         3.9 cm                 

 IVS Diastolic Thickness           1.4 cm                0.6 - 1.0 / 0.6 - 0.9 cm 

 LVPW Diastolic Thickness          1.3 cm                0.6 - 1.0 / 0.6 - 0.9 cm 

 LV Relative Wall Thickness        0.5                    

 RV Internal Dim ED PLAX           3.1 cm                 

 LA Systolic Diameter LX           3.7 cm                3.0 - 4.0 / 2.7 - 3.8 cm 

 LA Volume                         57.2 cm???              18 - 58 / 22 - 52 cm??? 

 

 M-MODE 

 Aortic Root Diameter MM           3.6 cm                 

 MV E Point Septal Separation      1.2 cm                 

 AV Cusp Separation MM             2.3 cm                 

 

 DOPPLER 

 AV Peak Velocity                  149.7 cm/s             

 AV Peak Gradient                  9.0 mmHg               

 MV Area PHT                       1.5 cm???                

 Mitral E Point Velocity           71.3 cm/s              

 Mitral A Point Velocity           99.0 cm/s              

 Mitral E to A Ratio               0.7                    

 MV Deceleration Time              496.9 ms               

 MV E' Velocity                    3.1 cm/s               

 Mitral E to MV E' Ratio           22.8                   

 TR Peak Velocity                  341.7 cm/s             

 TR Peak Gradient                  46.7 mmHg              

 Right Ventricular Systolic Press  51.7 mmHg              

 

 

 FINDINGS 

 Left Ventricle 

 Left ventricular ejection fraction is estimated at 40-45 %. Left ventricular  

 cavity size normal. Moderate concentric left ventricular hypertrophy. 

 

 Right Ventricle 

 Normal right ventricular size and function. Moderate pulmonary hypertension. 

 

 Right Atrium 

 Normal right atrial size. 

 

 Left Atrium 

 Normal left atrial size. No evidence for an atrial septal defect. 

 

 Mitral Valve 

 Mitral annular calcification. Mild mitral regurgitation. 

 

 Aortic Valve 

 Trileaflet aortic valve. Mild aortic regurgitation. 

 

 Tricuspid Valve 

 Mild tricuspid regurgitation. 

 

 Pulmonic Valve 

 Structurally normal pulmonic valve. 

 

 Pericardium 

 Normal pericardium. No pericardial effusion. 

 

 Aorta 

 Normal size aortic root and proximal ascending aorta. 

 

 CONCLUSIONS 

 Mildly impaired LV function with EF between 40-45% 

 Previewed by:  

 Dr. Aquiles Singh MD 

 (Electronically Signed) 

 Final Date:      2022 16:08

## 2022-10-08 NOTE — P.PN
Subjective


Progress Note Date: 10/08/22


Principal diagnosis: 





Heart failure with reduced ejection fraction





The patient is a pleasant 82-year-old gentleman with a past medical history 

significant for hypertension and dyslipidemia and also cardiomyopathy was EF 

around 40-45% who presented to the hospital with shortness of breath.  He was 

diagnosed with heart failure.  He was started on Lasix IV.  Also there was a 

concern about pulmonary embolic treatment for that reason VQ scan was ordered 

and showed low priority for PE.  In 2021 when he had the echo which showed an EF

between 40-45% he underwent myocardial perfusion imaging stress is and that 

showed an ischemia and for some reason further investigation including heart 

catheterization was not performed.





The patient was seen this morning.  He is feeling better indeterminable 

shortness of breath.  He reports no pain in the chest.  No lower extremities 

edema.  He his hemodynamic the stable with mildly elevated blood pressure.  

Currently he is on Lasix IV.  On examination he still have a few crackles 

bilaterally but no lower except his edema noted.  I advised the patient to stay 

on Lasix for one more day but he is somewhat reluctant.  An echocardiogram still

pending.





Objective





- Vital Signs


Vital signs: 


                                   Vital Signs











Temp  98.4 F   10/08/22 04:00


 


Pulse  52 L  10/08/22 04:00


 


Resp  16   10/08/22 04:00


 


BP  157/79   10/08/22 04:00


 


Pulse Ox  98   10/07/22 15:27


 


FiO2      








                                 Intake & Output











 10/07/22 10/08/22 10/08/22





 18:59 06:59 18:59


 


Weight  94 kg 


 


Other:   


 


  Voiding Method  Toilet 














- Constitutional


General appearance: Present: no acute distress





- Respiratory


Respiratory: bilateral: rales





- Cardiovascular


Rhythm: regular


Heart sounds: normal: S1, S2





- Labs


CBC & Chem 7: 


                                 10/07/22 10:28





                                 10/07/22 02:15


Labs: 


                  Abnormal Lab Results - Last 24 Hours (Table)











  10/07/22 10/07/22 10/07/22 Range/Units





  10:28 16:37 20:20 


 


RBC  3.28 L    (4.30-5.90)  m/uL


 


Hgb  10.3 L    (13.0-17.5)  gm/dL


 


Hct  32.1 L    (39.0-53.0)  %


 


APTT   47.1 H   (22.0-30.0)  sec


 


POC Glucose (mg/dL)    116 H  ()  mg/dL














  10/08/22 Range/Units





  06:04 


 


RBC   (4.30-5.90)  m/uL


 


Hgb   (13.0-17.5)  gm/dL


 


Hct   (39.0-53.0)  %


 


APTT   (22.0-30.0)  sec


 


POC Glucose (mg/dL)  146 H  ()  mg/dL














Assessment and Plan


Assessment: 





Assessment


#1 heart failure with reduced ejection fraction exacerbation


#2 cardiomyopathy with EF between 40-45% and known to be ischemic and 

nonischemic


#3 abnormal myocardial perfusion imaging stress test


#4 hypertension


#5 dyslipidemia





Plan


#1 follow-up on the echo to assess the ejection fraction.  Last echo was in 2021


#2 continue IV Lasix for additional 24 hours


#3 coronary angiogram to be done to rule out severe CAD


#4 follow-up with the patient

## 2022-10-08 NOTE — P.PN
Subjective


Progress Note Date: 10/08/22





Hospital course:





Patient is a very pleasant 82-year-old male with a past medical history of 

congestive heart failure, hypertension, hyperlipidemia, non-insulin-dependent 

diabetes mellitus, chronic kidney disease stage III, anemia of chronic disease, 

and hypothyroidism.  He presented to the emergency department with reports of 

progressively worsening shortness of breath 5 days.  Patient reports 

significant exertional dyspnea with any activity such as walking to the restroom

accompanied by orthopnea resulting in him having to sleep upright in his chair. 

The emergency department, patient underwent full evaluation.  CBC revealed mild 

normocytic anemia with hemoglobin stable at 10.2 at baseline levels.  BMP 

consistent with stage III chronic kidney disease with BUN of 24, creatinine 

1.45, GFR 45.  Troponin 0.034 and pro-BMP 15,000.  EKG completed revealing sinus

bradycardia at 59 bpm with frequent PVCs.  Chest x-ray revealing chronic pleural

thickening and pulmonary infiltrates.





Physical exam:





Patient was seen and fully evaluated at bedside this morning.  Patient reports 

he is doing well and feels as though he is breathing much better.  Documented 

urinary output over the past 24 hours was 800 mL.  Patient currently resting in 

bed and denies having any headache, lightheadedness, dizziness, chest pain, p

alpitations, or any other complaint at this time.  Bilateral lower extremity 

pitting edema is nearly resolved.  Renal function slightly elevated with BUN 29 

creatinine 1.68, and GFR 37.  We will continue to monitor closely with repeat 

a.m. labs.





Vital signs reviewed and stable. 


General: Nontoxic, no distress and appears stated age.  


Derm: Skin warm and dry, normal coloration for ethnicity.


Head: Atraumatic, normocephalic and symmetric.  


Eyes: EOMs intact, no lid lag, and anicteric sclera


Mouth: no lip lesions, mucus membranes moist


Cardiovascular: regular rate and rhythm with normal S1S2, systolic murmur, 

positive posterior tibial pulses bilaterally, and cap refill < 2 seconds.  


Lungs: Respirations even, regular, and unlabored on room air. Lungs slightly 

diminished with no crackles, no rhonchi, no rales, no wheezing, and no accessory

muscle usage. 


Abdominal: soft, nontender to palpation, no guarding, no appreciable 

organomegaly


Ext: ROM intact. No gross muscle atrophy, scant bilateral lower extremity 

pitting edema, no contractures


Neuro: Speech clear, face symmetrical and CN II-XII grossly intact with no noted

focal neuro deficits


Psych: Alert and oriented to person, place, time, and situation. Appropriate and

pleasant affect. 





Assessment and Plan of Care:





Acute systolic congestive heart failure exacerbation


Cardiomyopathy with EF between 40-45%


Exertional dyspnea


Orthopnea


-Cardiology consulted, appreciate recommendations.


-Telemetry monitoring


-ProBNP 15,000


-Daily weights


-Close monitoring of I's and O's


-Cardiac diet


-Lasix 40 mg IVP every 12 hours


-Continuation of daily medications including: Aspirin, atorvastatin, and 

metoprolol


-Continued close monitoring of electrolytes while diuresing. 


-Echocardiogram





CODE STATUS: Full code


DVT prophylaxis: Heparin


Discussed with: Patient and RN


Anticipated discharge date: Clinical course to determine


Anticipated discharge place: Home


A total of 36 minutes was spent on the care of this complex patient more than 

50% of the time was spent in counseling and care coordination.











I reviewed the documentation as provided by the TATI above, who is the original 

author of this note.  I agree with the documented assessment and plan, with the 

following changes: none





Objective





- Vital Signs


Vital signs: 


                                   Vital Signs











Temp  98.4 F   10/08/22 04:00


 


Pulse  52 L  10/08/22 04:00


 


Resp  16   10/08/22 04:00


 


BP  157/79   10/08/22 04:00


 


Pulse Ox  98   10/07/22 15:27


 


FiO2      








                                 Intake & Output











 10/07/22 10/08/22 10/08/22





 18:59 06:59 18:59


 


Weight  94 kg 


 


Other:   


 


  Voiding Method  Toilet 














- Labs


CBC & Chem 7: 


                                 10/08/22 09:22





                                 10/08/22 09:22


Labs: 


                  Abnormal Lab Results - Last 24 Hours (Table)











  10/07/22 10/07/22 10/07/22 Range/Units





  10:28 16:37 20:20 


 


RBC  3.28 L    (4.30-5.90)  m/uL


 


Hgb  10.3 L    (13.0-17.5)  gm/dL


 


Hct  32.1 L    (39.0-53.0)  %


 


APTT   47.1 H   (22.0-30.0)  sec


 


POC Glucose (mg/dL)    116 H  ()  mg/dL














  10/08/22 Range/Units





  06:04 


 


RBC   (4.30-5.90)  m/uL


 


Hgb   (13.0-17.5)  gm/dL


 


Hct   (39.0-53.0)  %


 


APTT   (22.0-30.0)  sec


 


POC Glucose (mg/dL)  146 H  ()  mg/dL

## 2022-10-09 VITALS — RESPIRATION RATE: 17 BRPM

## 2022-10-09 VITALS — TEMPERATURE: 98 F | SYSTOLIC BLOOD PRESSURE: 118 MMHG | HEART RATE: 51 BPM | DIASTOLIC BLOOD PRESSURE: 72 MMHG

## 2022-10-09 LAB
ALBUMIN SERPL-MCNC: 3.9 G/DL (ref 3.5–5)
ALP SERPL-CCNC: 92 U/L (ref 38–126)
ALT SERPL-CCNC: 19 U/L (ref 4–49)
ANION GAP SERPL CALC-SCNC: 13 MMOL/L
AST SERPL-CCNC: 25 U/L (ref 17–59)
BUN SERPL-SCNC: 39 MG/DL (ref 9–20)
CALCIUM SPEC-MCNC: 7.6 MG/DL (ref 8.4–10.2)
CHLORIDE SERPL-SCNC: 98 MMOL/L (ref 98–107)
CO2 SERPL-SCNC: 26 MMOL/L (ref 22–30)
ERYTHROCYTE [DISTWIDTH] IN BLOOD BY AUTOMATED COUNT: 3.23 M/UL (ref 4.3–5.9)
ERYTHROCYTE [DISTWIDTH] IN BLOOD: 13.3 % (ref 11.5–15.5)
GLUCOSE BLD-MCNC: 118 MG/DL (ref 70–110)
GLUCOSE BLD-MCNC: 141 MG/DL (ref 70–110)
GLUCOSE SERPL-MCNC: 216 MG/DL (ref 74–99)
HCT VFR BLD AUTO: 31.7 % (ref 39–53)
HGB BLD-MCNC: 10.4 GM/DL (ref 13–17.5)
MAGNESIUM SPEC-SCNC: 1.8 MG/DL (ref 1.6–2.3)
MCH RBC QN AUTO: 32.1 PG (ref 25–35)
MCHC RBC AUTO-ENTMCNC: 32.7 G/DL (ref 31–37)
MCV RBC AUTO: 98.3 FL (ref 80–100)
PLATELET # BLD AUTO: 352 K/UL (ref 150–450)
POTASSIUM SERPL-SCNC: 4.6 MMOL/L (ref 3.5–5.1)
PROT SERPL-MCNC: 7 G/DL (ref 6.3–8.2)
SODIUM SERPL-SCNC: 137 MMOL/L (ref 137–145)
WBC # BLD AUTO: 6 K/UL (ref 3.8–10.6)

## 2022-10-09 RX ADMIN — ASPIRIN 81 MG CHEWABLE TABLET SCH MG: 81 TABLET CHEWABLE at 09:11

## 2022-10-09 RX ADMIN — INSULIN ASPART SCH: 100 INJECTION, SOLUTION INTRAVENOUS; SUBCUTANEOUS at 11:57

## 2022-10-09 RX ADMIN — LEVOTHYROXINE SODIUM SCH MCG: 0.12 TABLET ORAL at 06:10

## 2022-10-09 RX ADMIN — METOPROLOL TARTRATE SCH MG: 50 TABLET, FILM COATED ORAL at 09:11

## 2022-10-09 RX ADMIN — INSULIN ASPART SCH: 100 INJECTION, SOLUTION INTRAVENOUS; SUBCUTANEOUS at 06:37

## 2022-10-09 NOTE — P.PN
Subjective


Progress Note Date: 10/09/22


Principal diagnosis: 





Heart failure with reduced ejection fraction





The patient is a pleasant 82-year-old gentleman with a past medical history 

significant for hypertension and dyslipidemia and also cardiomyopathy was EF 

around 40-45% who presented to the hospital with shortness of breath.  He was 

diagnosed with heart failure.  He was started on Lasix IV.  Also there was a 

concern about pulmonary embolic treatment for that reason VQ scan was ordered 

and showed low priority for PE.  In 2021 when he had the echo which showed an EF

between 40-45% he underwent myocardial perfusion imaging stress is and that 

showed an ischemia and for some reason further investigation including heart 

catheterization was not performed.





The patient was seen this morning.  He is feeling better indeterminable 

shortness of breath.  He reports no pain in the chest.  No lower extremities 

edema.  He his hemodynamic the stable with mildly elevated blood pressure.  

Currently he is on Lasix IV.  On examination he still have a few crackles 

bilaterally but no lower except his edema noted.  I advised the patient to stay 

on Lasix for one more day but he is somewhat reluctant.  An echocardiogram still

pending.





October 9 of 2022


The patient was seen this morning.  He is overall feeling better.  The shortness

of breath has improved significantly.  No lower extremities edema.  No symptoms 

of chest pain or chest discomfort.  On examination he seems to be euvolemic.  He

underwent an echo which showed an EF between 40-45% with moderate pulmonary 

hypertension.  From the cardiac standpoint of view, going to stop the Lasix IV 

and start the patient on Lasix by mouth.  He potentially can be discharged later

on today and follow-up with his primary cardiologist.  Further investigation 

needed including a heart catheterization to rule out severe CAD in the light off

abnormal myocardial perfusion imaging stress is and that can be done as an 

outpatient.





Objective





- Vital Signs


Vital signs: 


                                   Vital Signs











Temp  97.9 F   10/09/22 04:00


 


Pulse  59 L  10/09/22 04:00


 


Resp  16   10/09/22 04:00


 


BP  146/81   10/09/22 04:00


 


Pulse Ox  98   10/09/22 04:00


 


FiO2      








                                 Intake & Output











 10/08/22 10/09/22 10/09/22





 18:59 06:59 18:59


 


Intake Total 480 20 


 


Output Total 1200 950 


 


Balance -720 -930 


 


Weight 94 kg 92.4 kg 


 


Intake:   


 


  IV  20 


 


    Invasive Line 1  20 


 


  Oral 480  


 


Output:   


 


  Urine 1200 950 


 


Other:   


 


  Voiding Method Toilet Toilet 














- Constitutional


General appearance: Present: no acute distress





- Respiratory


Respiratory: bilateral: CTA





- Cardiovascular


Rhythm: regular


Abnormal Heart Sounds: Present: systolic murmur





- Labs


CBC & Chem 7: 


                                 10/08/22 09:22





                                 10/08/22 09:22


Labs: 


                  Abnormal Lab Results - Last 24 Hours (Table)











  10/08/22 10/08/22 10/08/22 Range/Units





  09:22 09:22 11:47 


 


RBC   3.13 L   (4.30-5.90)  m/uL


 


Hgb   9.8 L   (13.0-17.5)  gm/dL


 


Hct   31.2 L   (39.0-53.0)  %


 


Sodium  136 L    (137-145)  mmol/L


 


Chloride  97 L    ()  mmol/L


 


BUN  29 H    (9-20)  mg/dL


 


Creatinine  1.68 H    (0.66-1.25)  mg/dL


 


Glucose  277 H    (74-99)  mg/dL


 


POC Glucose (mg/dL)    138 H  ()  mg/dL


 


Calcium  7.9 L    (8.4-10.2)  mg/dL














  10/08/22 10/08/22 10/09/22 Range/Units





  16:49 20:07 06:28 


 


RBC     (4.30-5.90)  m/uL


 


Hgb     (13.0-17.5)  gm/dL


 


Hct     (39.0-53.0)  %


 


Sodium     (137-145)  mmol/L


 


Chloride     ()  mmol/L


 


BUN     (9-20)  mg/dL


 


Creatinine     (0.66-1.25)  mg/dL


 


Glucose     (74-99)  mg/dL


 


POC Glucose (mg/dL)  116 H  153 H  141 H  ()  mg/dL


 


Calcium     (8.4-10.2)  mg/dL














Assessment and Plan


Assessment: 





Assessment


#1 heart failure with reduced ejection fraction exacerbation


#2 cardiomyopathy with EF between 40-45% and known to be ischemic and non

ischemic


#3 abnormal myocardial perfusion imaging stress test


#4 hypertension


#5 dyslipidemia





Plan


#1 DC Lasix IV and start the patient on Lasix by mouth


#2 the echo was reviewed and showed an EF between 40-45% with moderate pulmonary

hypertension


#3 further investigation regarding abnormal myocardial perfusion imaging stress 

test need to be done including possibly heart catheterization


#4 the patient can be discharged home.

## 2022-10-09 NOTE — P.DS
Providers


Date of admission: 


10/07/22 03:03





Expected date of discharge: 10/09/22


Attending physician: 


Braxton Beach MD





Consults: 





                                        





10/07/22 03:03


Consult Physician Routine 


   Consulting Provider: Kilo Donaldson


   Consult Reason/Comments: heart failure


   Do you want consulting provider notified?: Yes











Primary care physician: 


[facility]





Hospital Course: 





Discharge Diagnosis:





Acute systolic congestive heart failure exacerbation.  patient discharged home 

on Lasix 40 mg daily. Patient to follow up outpatient with cardiology as 

discussed for scheduling of a cardiac catheterization.





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





Exertional dyspnea, secondary to acute systolic congestive heart failure 

exacerbation.Improved.





Orthopnea, secondary to acute systolic congestive heart failure 

exacerbation.Improved.





Bradycardia, asymptomatic.  Metoprolol was decreased from 50 mg twice daily to 

25 mg twice daily.





Hypertension, metoprolol decreased from 50 mg twice daily to 25 mg twice daily 

and patient was started on amlodipine 5 mg daily.





Hyperlipidemia, Continue daily medication regimen with simvastatin.





Non-insulin-dependent diabetes mellitus. continue daily medication regimen with 

Glucophage.





Stage III chronic kidney disease. Creatinine increased from 1.45-1.94 throughout

hospitalization and administration of IV diuretics.  Lasix decreased upon 

discharge to 40 mg orally once daily and patient given prescription to have BMP 

redrawn in 3 days to ensure improvement of renal function with decrease of 

Lasix.  Results to go to PCP for follow-up. 





Anemia of chronic disease, stable





Hypothyroidism, continue home medication regimen with levothyroxine.





Hospital Course: 





Patient is a very pleasant 82-year-old male with a past medical history of 

congestive heart failure, hypertension, hyperlipidemia, non-insulin-dependent 

diabetes mellitus, chronic kidney disease stage III, anemia of chronic disease, 

and hypothyroidism.  He presented to the emergency department with reports of 

progressively worsening shortness of breath 5 days.  Patient reports 

significant exertional dyspnea with any activity such as walking to the restroom

accompanied by orthopnea resulting in him having to sleep upright in his chair. 

The emergency department, patient underwent full evaluation.  CBC revealed mild 

normocytic anemia with hemoglobin stable at 10.2 at baseline levels.  BMP 

consistent with stage III chronic kidney disease with BUN of 24, creatinine 

1.45, GFR 45.  Troponin 0.034 and pro-BMP 15,000.  EKG completed revealing sinus

bradycardia at 59 bpm with frequent PVCs.  Chest x-ray revealing chronic pleural

thickening and pulmonary infiltrates.  Pt was admitted under our services with 

consultation to cardiology. He underwent an inpatient hospitalization for 

treatment of acute systolic heart failure and received IV diuretics.  An 

echocardiogram was completed revealing EF of 40-45% with moderate concentric 

left ventricular hypertrophy. Creatinine increased from 1.45-1.94 throughout 

hospitalization due to administration of IV diuretics.  Lasix decreased down to 

40 mg orally once daily and patient given prescription to have BMP redrawn in 3 

days to ensure improvement of renal function with decrease of Lasix. Cardiology 

recommended patient follow-up outpatient in their office for outpatient cardiac 

cath to be completed. medically, patient is stable for discharge at this time.  

Patient instructed he will need to follow-up with PCP and 1-2 days and 

cardiology in 1 week.





Physical exam:





Vital signs reviewed and stable. 


General: Nontoxic, no distress and appears stated age.  


Derm: Skin warm and dry, normal coloration for ethnicity.


Head: Atraumatic, normocephalic and symmetric.  


Eyes: EOMs intact, no lid lag, and anicteric sclera


Mouth: no lip lesions, mucus membranes moist


Cardiovascular: regular rate and rhythm with normal S1S2, systolic murmur, 

positive posterior tibial pulses bilaterally, and cap refill < 2 seconds.  


Lungs: Respirations even, regular, and unlabored on room air. Lungs slightly 

diminished with no crackles, no rhonchi, no rales, no wheezing, and no accessory

muscle usage. 


Abdominal: soft, nontender to palpation, no guarding, no appreciable 

organomegaly


Ext: ROM intact. No gross muscle atrophy, scant bilateral lower extremity 

pitting edema, no contractures


Neuro: Speech clear, face symmetrical and CN II-XII grossly intact with no noted

focal neuro deficits


Psych: Alert and oriented to person, place, time, and situation. Appropriate and

pleasant affect. 








A total of 38 minutes of time were spent preparing this complex discharge 

summary.





Pt was discharged on  10/09/2022 at 9:50 AM.








.I reviewed the documentation as provided by the TATI above, who is the original 

author of this note.  I agree with the documented assessment and plan, with the 

following changes: none


Patient Condition at Discharge: Stable





Plan - Discharge Summary


Discharge Rx Participant: No


New Discharge Prescriptions: 


New


   amLODIPine [Norvasc] 5 mg PO DAILY 60 Days #60 tab


   Furosemide [Lasix] 40 mg PO DAILY 30 Days #30 tab





Continue


   Levothyroxine Sodium [Synthroid] 125 mcg PO DAILY


   Aspirin EC [Ecotrin Low Dose] 81 mg PO Q48H


   metFORMIN HCL [Glucophage] 500 mg PO BID


   Simvastatin [Zocor] 40 mg PO HS





Changed


   Metoprolol Tartrate [Lopressor] 25 mg PO BID #60 tab


Discharge Medication List





Aspirin EC [Ecotrin Low Dose] 81 mg PO Q48H 11/16/21 [History]


Levothyroxine Sodium [Synthroid] 125 mcg PO DAILY 11/16/21 [History]


Simvastatin [Zocor] 40 mg PO HS 11/16/21 [History]


metFORMIN HCL [Glucophage] 500 mg PO BID 09/21/22 [History]


Furosemide [Lasix] 40 mg PO DAILY 30 Days #30 tab 10/09/22 [Rx]


Metoprolol Tartrate [Lopressor] 25 mg PO BID #60 tab 10/09/22 [Rx]


amLODIPine [Norvasc] 5 mg PO DAILY 60 Days #60 tab 10/09/22 [Rx]








Follow up Appointment(s)/Referral(s): 


Tesfaye Salazar MD [STAFF PHYSICIAN] - 1 Week


[facility] [Primary Care Provider] - 1-2 days


Ambulatory/Diagnostic Orders: 


Basic Metabolic Panel [LAB.AMB] Time Frame: 3 Days, Location: None Selected


Magnesium [LAB.AMB] Location: None Selected


Patient Instructions/Handouts:  Heart Failure (DC)


Activity/Diet/Wound Care/Special Instructions: 





Activity:





As tolerated.  Take breaks as needed.





Diet: 





Heart healthy and carb consistent diet. Avoid salts, or foods with hidden salts 

such as canned or boxed foods and frozen dinners. Extra salt makes your heart 

work harder and traps the fluid in your body for longer.





Special Instructions:  





Take all of your medications as directed and remember to keep all of your 

doctor's appointments and follow-up as needed.  





You will need to follow up as Dr. Singh, Cardiologist disussed with you to 

schedule an outpatient cardiac cath. Please call the office first thing Monday 

morning to schedule an appointment with Dr. Salazar. 





I want to thank you for your service, it is always an honor caring for a 

!!!!!!!





Thank you for allowing us to participate in your care, it was truly a pleasure 

having you for our patient!!! 








Discharge Disposition: HOME SELF-CARE

## 2023-08-08 ENCOUNTER — HOSPITAL ENCOUNTER (INPATIENT)
Dept: HOSPITAL 47 - EC | Age: 83
LOS: 5 days | Discharge: HOME | DRG: 291 | End: 2023-08-13
Attending: INTERNAL MEDICINE | Admitting: INTERNAL MEDICINE
Payer: OTHER GOVERNMENT

## 2023-08-08 DIAGNOSIS — I27.20: ICD-10-CM

## 2023-08-08 DIAGNOSIS — E66.9: ICD-10-CM

## 2023-08-08 DIAGNOSIS — I44.7: ICD-10-CM

## 2023-08-08 DIAGNOSIS — E11.22: ICD-10-CM

## 2023-08-08 DIAGNOSIS — I5A: ICD-10-CM

## 2023-08-08 DIAGNOSIS — Z87.891: ICD-10-CM

## 2023-08-08 DIAGNOSIS — R91.8: ICD-10-CM

## 2023-08-08 DIAGNOSIS — I08.3: ICD-10-CM

## 2023-08-08 DIAGNOSIS — I25.10: ICD-10-CM

## 2023-08-08 DIAGNOSIS — I13.0: Primary | ICD-10-CM

## 2023-08-08 DIAGNOSIS — I42.9: ICD-10-CM

## 2023-08-08 DIAGNOSIS — Z79.899: ICD-10-CM

## 2023-08-08 DIAGNOSIS — R00.1: ICD-10-CM

## 2023-08-08 DIAGNOSIS — N17.0: ICD-10-CM

## 2023-08-08 DIAGNOSIS — Z79.890: ICD-10-CM

## 2023-08-08 DIAGNOSIS — E78.5: ICD-10-CM

## 2023-08-08 DIAGNOSIS — I50.23: ICD-10-CM

## 2023-08-08 DIAGNOSIS — Z79.84: ICD-10-CM

## 2023-08-08 DIAGNOSIS — N18.32: ICD-10-CM

## 2023-08-08 DIAGNOSIS — D63.1: ICD-10-CM

## 2023-08-08 DIAGNOSIS — Z82.49: ICD-10-CM

## 2023-08-08 DIAGNOSIS — E89.0: ICD-10-CM

## 2023-08-08 LAB
ALBUMIN SERPL-MCNC: 3.9 G/DL (ref 3.5–5)
ALP SERPL-CCNC: 104 U/L (ref 38–126)
ALT SERPL-CCNC: 22 U/L (ref 4–49)
ANION GAP SERPL CALC-SCNC: 12 MMOL/L
APTT BLD: 22.2 SEC (ref 22–30)
AST SERPL-CCNC: 32 U/L (ref 17–59)
BASOPHILS # BLD AUTO: 0.1 K/UL (ref 0–0.2)
BASOPHILS NFR BLD AUTO: 1 %
BUN SERPL-SCNC: 36 MG/DL (ref 9–20)
CALCIUM SPEC-MCNC: 7.4 MG/DL (ref 8.4–10.2)
CHLORIDE SERPL-SCNC: 103 MMOL/L (ref 98–107)
CO2 SERPL-SCNC: 24 MMOL/L (ref 22–30)
EOSINOPHIL # BLD AUTO: 0.3 K/UL (ref 0–0.7)
EOSINOPHIL NFR BLD AUTO: 4 %
ERYTHROCYTE [DISTWIDTH] IN BLOOD BY AUTOMATED COUNT: 4.06 M/UL (ref 4.3–5.9)
ERYTHROCYTE [DISTWIDTH] IN BLOOD: 14.6 % (ref 11.5–15.5)
GLUCOSE BLD-MCNC: 160 MG/DL (ref 70–110)
GLUCOSE BLD-MCNC: 169 MG/DL (ref 70–110)
GLUCOSE BLD-MCNC: 201 MG/DL (ref 70–110)
GLUCOSE SERPL-MCNC: 164 MG/DL (ref 74–99)
HCT VFR BLD AUTO: 36.6 % (ref 39–53)
HGB BLD-MCNC: 11.9 GM/DL (ref 13–17.5)
INR PPP: 1.1 (ref ?–1.2)
LYMPHOCYTES # SPEC AUTO: 1.8 K/UL (ref 1–4.8)
LYMPHOCYTES NFR SPEC AUTO: 24 %
MAGNESIUM SPEC-SCNC: 2.2 MG/DL (ref 1.6–2.3)
MCH RBC QN AUTO: 29.3 PG (ref 25–35)
MCHC RBC AUTO-ENTMCNC: 32.5 G/DL (ref 31–37)
MCV RBC AUTO: 90.3 FL (ref 80–100)
MONOCYTES # BLD AUTO: 0.6 K/UL (ref 0–1)
MONOCYTES NFR BLD AUTO: 9 %
NEUTROPHILS # BLD AUTO: 4.3 K/UL (ref 1.3–7.7)
NEUTROPHILS NFR BLD AUTO: 59 %
NT-PROBNP SERPL-MCNC: (no result) PG/ML
PLATELET # BLD AUTO: 339 K/UL (ref 150–450)
POTASSIUM SERPL-SCNC: 4.3 MMOL/L (ref 3.5–5.1)
PROT SERPL-MCNC: 7.7 G/DL (ref 6.3–8.2)
PT BLD: 11 SEC (ref 9–12)
SODIUM SERPL-SCNC: 139 MMOL/L (ref 137–145)
WBC # BLD AUTO: 7.3 K/UL (ref 3.8–10.6)

## 2023-08-08 PROCEDURE — 80048 BASIC METABOLIC PNL TOTAL CA: CPT

## 2023-08-08 PROCEDURE — 99285 EMERGENCY DEPT VISIT HI MDM: CPT

## 2023-08-08 PROCEDURE — 85025 COMPLETE CBC W/AUTO DIFF WBC: CPT

## 2023-08-08 PROCEDURE — 71250 CT THORAX DX C-: CPT

## 2023-08-08 PROCEDURE — 80053 COMPREHEN METABOLIC PANEL: CPT

## 2023-08-08 PROCEDURE — 94760 N-INVAS EAR/PLS OXIMETRY 1: CPT

## 2023-08-08 PROCEDURE — 71046 X-RAY EXAM CHEST 2 VIEWS: CPT

## 2023-08-08 PROCEDURE — 85730 THROMBOPLASTIN TIME PARTIAL: CPT

## 2023-08-08 PROCEDURE — 93005 ELECTROCARDIOGRAM TRACING: CPT

## 2023-08-08 PROCEDURE — 85027 COMPLETE CBC AUTOMATED: CPT

## 2023-08-08 PROCEDURE — 84484 ASSAY OF TROPONIN QUANT: CPT

## 2023-08-08 PROCEDURE — 83036 HEMOGLOBIN GLYCOSYLATED A1C: CPT

## 2023-08-08 PROCEDURE — 36415 COLL VENOUS BLD VENIPUNCTURE: CPT

## 2023-08-08 PROCEDURE — 71045 X-RAY EXAM CHEST 1 VIEW: CPT

## 2023-08-08 PROCEDURE — 83880 ASSAY OF NATRIURETIC PEPTIDE: CPT

## 2023-08-08 PROCEDURE — 85610 PROTHROMBIN TIME: CPT

## 2023-08-08 PROCEDURE — 81003 URINALYSIS AUTO W/O SCOPE: CPT

## 2023-08-08 PROCEDURE — 96374 THER/PROPH/DIAG INJ IV PUSH: CPT

## 2023-08-08 PROCEDURE — 93306 TTE W/DOPPLER COMPLETE: CPT

## 2023-08-08 PROCEDURE — 83735 ASSAY OF MAGNESIUM: CPT

## 2023-08-08 RX ADMIN — ASPIRIN 81 MG CHEWABLE TABLET SCH MG: 81 TABLET CHEWABLE at 11:54

## 2023-08-08 RX ADMIN — INSULIN ASPART SCH: 100 INJECTION, SOLUTION INTRAVENOUS; SUBCUTANEOUS at 13:03

## 2023-08-08 RX ADMIN — ISOSORBIDE DINITRATE SCH MG: 10 TABLET ORAL at 22:32

## 2023-08-08 RX ADMIN — LOSARTAN POTASSIUM SCH MG: 25 TABLET, FILM COATED ORAL at 11:54

## 2023-08-08 RX ADMIN — INSULIN ASPART SCH: 100 INJECTION, SOLUTION INTRAVENOUS; SUBCUTANEOUS at 17:09

## 2023-08-08 RX ADMIN — FUROSEMIDE SCH MG: 10 INJECTION, SOLUTION INTRAMUSCULAR; INTRAVENOUS at 22:30

## 2023-08-08 RX ADMIN — ISOSORBIDE DINITRATE SCH MG: 10 TABLET ORAL at 16:05

## 2023-08-08 RX ADMIN — HEPARIN SODIUM SCH: 5000 INJECTION, SOLUTION INTRAVENOUS; SUBCUTANEOUS at 11:55

## 2023-08-08 RX ADMIN — HEPARIN SODIUM SCH UNIT: 5000 INJECTION, SOLUTION INTRAVENOUS; SUBCUTANEOUS at 22:30

## 2023-08-08 RX ADMIN — FUROSEMIDE SCH MG: 10 INJECTION, SOLUTION INTRAMUSCULAR; INTRAVENOUS at 11:54

## 2023-08-08 RX ADMIN — INSULIN ASPART SCH UNIT: 100 INJECTION, SOLUTION INTRAVENOUS; SUBCUTANEOUS at 22:31

## 2023-08-08 RX ADMIN — ATORVASTATIN CALCIUM SCH MG: 20 TABLET, FILM COATED ORAL at 22:30

## 2023-08-08 NOTE — US
EXAMINATION TYPE: US venous doppler duplex LE LT

 

DATE OF EXAM: 8/8/2023 11:20 AM

 

COMPARISON: NONE

 

CLINICAL INDICATION: Male, 83 years old with history of LLE swelling x 1 week; No hx of DVT. Swelling
 x 1 week.

 

SIDE PERFORMED: Left  

 

TECHNIQUE:  The lower extremity deep venous system is examined utilizing real time linear array sonog
kim with graded compression, doppler sonography and color-flow sonography.

 

VESSELS IMAGED:

Common Femoral Vein

Deep Femoral Vein

Greater Saphenous Vein *

Femoral Vein

Popliteal Vein

Small Saphenous Vein *

Proximal Calf Veins

(* superficial vessels)

 

 

 

 

Left Leg:  No evidence of DVT.

 

 

 

IMPRESSION:  

No evidence for DVT at this time.

## 2023-08-08 NOTE — XR
EXAMINATION TYPE: XR chest 2V

 

DATE OF EXAM: 8/8/2023

 

COMPARISON: 10/7/2022

 

HISTORY: Shortness of breath

 

TECHNIQUE:  Frontal and lateral views of the chest are obtained.

 

FINDINGS:

 

Scattered senescent parenchymal changes noted. Hyperinflation compatible with COPD. 

 

There is left upper lobe masslike area measuring approximately 8.9 x 6.4 cm. CT is recommended for fu
rther evaluation. There is stable interstitial change throughout the right lung likely reflecting seq
uela of prior infection.

 

Heart size is stable.

 

Mediastinal structures are stable and grossly unremarkable.

 

No evidence for hilar prominence.

 

Degenerative changes dorsal spine. 

 

IMPRESSION:

1. There is left upper lobe masslike area measuring approximately 8.9 x 6.4 cm. CT is recommended for
 further evaluation. There is stable interstitial change throughout the right lung likely reflecting 
sequela of prior infection.

## 2023-08-08 NOTE — ED
General Adult HPI





- General


Chief complaint: Shortness of Breath


Stated complaint: Possible pneumonia


Time Seen by Provider: 08/08/23 05:02


Source: patient, RN notes reviewed, old records reviewed


Mode of arrival: ambulatory


Limitations: no limitations





- History of Present Illness


Initial comments: 





83-year-old male presenting for evaluation of increasing dyspnea.  Patient 

states his symptoms have progressed over the past several days.  He denies centr

al chest pain.  Denies fever.  He reports a minor cough.  Cough is 

nonproductive.  No history of asthma or COPD.  Patient does have history of 

congestive heart failure.





- Related Data


                                Home Medications











 Medication  Instructions  Recorded  Confirmed


 


Aspirin EC [Ecotrin Low Dose] 81 mg PO Q48H 11/16/21 10/07/22


 


Levothyroxine Sodium [Synthroid] 125 mcg PO DAILY 11/16/21 10/07/22


 


Simvastatin [Zocor] 40 mg PO HS 11/16/21 10/07/22


 


metFORMIN HCL [Glucophage] 500 mg PO BID 09/21/22 10/07/22








                                  Previous Rx's











 Medication  Instructions  Recorded


 


Furosemide [Lasix] 40 mg PO DAILY 30 Days #30 tab 10/09/22


 


Metoprolol Tartrate [Lopressor] 25 mg PO BID #60 tab 10/09/22


 


amLODIPine [Norvasc] 5 mg PO DAILY 60 Days #60 tab 10/09/22











                                    Allergies











Allergy/AdvReac Type Severity Reaction Status Date / Time


 


No Known Allergies Allergy   Verified 08/08/23 05:08














Review of Systems


ROS Statement: 


Those systems with pertinent positive or pertinent negative responses have been 

documented in the HPI.





ROS Other: All systems not noted in ROS Statement are negative.





Past Medical History


Past Medical History: Heart Failure, Diabetes Mellitus, Hyperlipidemia, Thyroid 

Disorder


Additional Past Medical History / Comment(s): abnormal K+


History of Any Multi-Drug Resistant Organisms: None Reported


Additional Past Surgical History / Comment(s): Thyroidectomy


Past Anesthesia/Blood Transfusion Reactions: No Reported Reaction


Past Psychological History: No Psychological Hx Reported


Smoking Status: Former smoker


Past Alcohol Use History: None Reported


Past Drug Use History: None Reported





- Past Family History


  ** Mother


Family Medical History: Dementia


Additional Family Medical History / Comment(s): alzheimers





  ** Father


Family Medical History: Congestive Heart Failure (CHF)





General Exam


Limitations: no limitations


General appearance: alert, in no apparent distress


Head exam: Present: atraumatic, normocephalic


Eye exam: Present: normal appearance, PERRL


ENT exam: Present: normal exam


Neck exam: Present: normal inspection.  Absent: tenderness


Respiratory exam: Present: rales (Right lung base).  Absent: wheezes


Cardiovascular Exam: Present: regular rate, normal rhythm


GI/Abdominal exam: Present: soft





Course


                                   Vital Signs











  08/08/23 08/08/23 08/08/23





  05:05 05:11 05:30


 


Temperature 97.8 F  


 


Pulse Rate 63  55 L


 


Respiratory 20 22 24





Rate   


 


Blood Pressure 127/66  


 


O2 Sat by Pulse 95  94 L





Oximetry   














  08/08/23





  06:37


 


Temperature 98.0 F


 


Pulse Rate 57 L


 


Respiratory 20





Rate 


 


Blood Pressure 141/71


 


O2 Sat by Pulse 95





Oximetry 














Medical Decision Making





- Medical Decision Making





Was pt. sent in by a medical professional or institution (, PA, NP, urgent 

care, hospital, or nursing home...) When possible be specific


@  -No


Did you speak to anyone other than the patient for history (EMS, parent, family,

 police, friend...)? What history was obtained from this source 


@  -No


Did you review nursing and triage notes (agree or disagree)?  Why? 


@  -I reviewed and agree with nursing and triage notes


Were old charts reviewed (outside hosp., previous admission, EMS record, old 

EKG, old radiological studies, urgent care reports/EKG's, nursing home records)?

 Report findings 


@  -No old charts were reviewed


Differential Diagnosis (chest pain, altered mental status, abdominal pain women,

 abdominal pain men, vaginal bleeding, weakness, fever, dyspnea, syncope, 

headache, dizziness, GI bleed, back pain, seizure, CVA, palpatations, mental 

health, musculoskeletal)? 


@  -not applicable


EKG interpreted by me (3pts min.).


@ Sinus bradycardia with PVC left bundle branch block, rate 58, AK interval 196,

 QRS duration 169, 


X-rays interpreted by me (1pt min.).


@  -Chest x-ray showing concern for mass in the left upper lung, cardiomegaly, 

and increased pulmonary vascular congestion


CT interpreted by me (1pt min.).


@  -None done


U/S interpreted by me (1pt. min.).


@  -None done


What testing was considered but not performed or refused? (CT, X-rays, U/S, 

labs)? Why?


@  -None


What meds were considered but not given or refused? Why?


@  -None


Did you discuss the management of the patient with other professionals 

(professionals i.e. , PA, NP, lab, RT, psych nurse, , , 

teacher, , )? Give summary


@  -No


Was smoking cessation discussed for >3mins.?


@  -No


Was critical care preformed (if so, how long)?


@  -No


Were there social determinants of health that impacted care today? How? 

(Homelessness, low income, unemployed, alcoholism, drug addiction, 

transportation, low edu. Level, literacy, decrease access to med. care, longterm, 

rehab)?


@  -No


Was there de-escalation of care discussed even if they declined (Discuss DNR or 

withdrawal of care, Hospice)? DNR status


@  -No


What co-morbidities impacted this encounter? (DM, HTN, Smoking, COPD, CAD, 

Cancer, CVA, ARF, Chemo, Hep., AIDS, mental health diagnosis, sleep apnea, 

morbid obesity)?


@  -CHF


Was patient admitted / discharged? Hospital course, mention meds given and 

route, prescriptions, significant lab abnormalities, going to OR and other 

pertinent info.


@  -[83-year-old male with increased exertional dyspnea, Rales on lung 

auscultation and elevated BNP and minimally elevated troponin.  Will be admitted

 to [facility] physician group with cardiology on consult.  Troponin level will be 

trended.  No active chest pain.


Undiagnosed new problem with uncertain prognosis?


@  -No


Drug Therapy requiring intensive monitoring for toxicity (Heparin, Nitro, 

Insulin, Cardizem)?


@  -No


Were any procedures done?


@  -No


Diagnosis/symptom?


@CHF, lung mass


Acute, or Chronic, or Acute on Chronic?


@  -Acute


Uncomplicated (without systemic symptoms) or Complicated (systemic symptoms)?


@  -default


Side effects of treatment?


@  -No


Exacerbation, Progression, or Severe Exacerbation?


@  -No


Poses a threat to life or bodily function? How? (Chest pain, USA, MI, pneumonia,

 PE, COPD, DKA, ARF, appy, cholecystitis, CVA, Diverticulitis, Homicidal, 

Suicidal, threat to staff... and all critical care pts)


@, Yes, CHF





- Lab Data


Result diagrams: 


                                 08/08/23 05:14





                                 08/08/23 05:14


                                   Lab Results











  08/08/23 08/08/23 08/08/23 Range/Units





  05:14 05:14 05:14 


 


WBC  7.3    (3.8-10.6)  k/uL


 


RBC  4.06 L    (4.30-5.90)  m/uL


 


Hgb  11.9 L    (13.0-17.5)  gm/dL


 


Hct  36.6 L    (39.0-53.0)  %


 


MCV  90.3    (80.0-100.0)  fL


 


MCH  29.3    (25.0-35.0)  pg


 


MCHC  32.5    (31.0-37.0)  g/dL


 


RDW  14.6    (11.5-15.5)  %


 


Plt Count  339    (150-450)  k/uL


 


MPV  8.4    


 


Neutrophils %  59    %


 


Lymphocytes %  24    %


 


Monocytes %  9    %


 


Eosinophils %  4    %


 


Basophils %  1    %


 


Neutrophils #  4.3    (1.3-7.7)  k/uL


 


Lymphocytes #  1.8    (1.0-4.8)  k/uL


 


Monocytes #  0.6    (0-1.0)  k/uL


 


Eosinophils #  0.3    (0-0.7)  k/uL


 


Basophils #  0.1    (0-0.2)  k/uL


 


PT   11.0   (9.0-12.0)  sec


 


INR   1.1   (<1.2)  


 


APTT   22.2   (22.0-30.0)  sec


 


Sodium    139  (137-145)  mmol/L


 


Potassium    4.3  (3.5-5.1)  mmol/L


 


Chloride    103  ()  mmol/L


 


Carbon Dioxide    24  (22-30)  mmol/L


 


Anion Gap    12  mmol/L


 


BUN    36 H  (9-20)  mg/dL


 


Creatinine    1.67 H  (0.66-1.25)  mg/dL


 


Est GFR (CKD-EPI)AfAm    43  (>60 ml/min/1.73 sqM)  


 


Est GFR (CKD-EPI)NonAf    37  (>60 ml/min/1.73 sqM)  


 


Glucose    164 H  (74-99)  mg/dL


 


Calcium    7.4 L  (8.4-10.2)  mg/dL


 


Magnesium    2.2  (1.6-2.3)  mg/dL


 


Total Bilirubin    0.9  (0.2-1.3)  mg/dL


 


AST    32  (17-59)  U/L


 


ALT    22  (4-49)  U/L


 


Alkaline Phosphatase    104  ()  U/L


 


Troponin I     (0.000-0.034)  ng/mL


 


NT-Pro-B Natriuret Pep    49548  pg/mL


 


Total Protein    7.7  (6.3-8.2)  g/dL


 


Albumin    3.9  (3.5-5.0)  g/dL














  08/08/23 Range/Units





  05:14 


 


WBC   (3.8-10.6)  k/uL


 


RBC   (4.30-5.90)  m/uL


 


Hgb   (13.0-17.5)  gm/dL


 


Hct   (39.0-53.0)  %


 


MCV   (80.0-100.0)  fL


 


MCH   (25.0-35.0)  pg


 


MCHC   (31.0-37.0)  g/dL


 


RDW   (11.5-15.5)  %


 


Plt Count   (150-450)  k/uL


 


MPV   


 


Neutrophils %   %


 


Lymphocytes %   %


 


Monocytes %   %


 


Eosinophils %   %


 


Basophils %   %


 


Neutrophils #   (1.3-7.7)  k/uL


 


Lymphocytes #   (1.0-4.8)  k/uL


 


Monocytes #   (0-1.0)  k/uL


 


Eosinophils #   (0-0.7)  k/uL


 


Basophils #   (0-0.2)  k/uL


 


PT   (9.0-12.0)  sec


 


INR   (<1.2)  


 


APTT   (22.0-30.0)  sec


 


Sodium   (137-145)  mmol/L


 


Potassium   (3.5-5.1)  mmol/L


 


Chloride   ()  mmol/L


 


Carbon Dioxide   (22-30)  mmol/L


 


Anion Gap   mmol/L


 


BUN   (9-20)  mg/dL


 


Creatinine   (0.66-1.25)  mg/dL


 


Est GFR (CKD-EPI)AfAm   (>60 ml/min/1.73 sqM)  


 


Est GFR (CKD-EPI)NonAf   (>60 ml/min/1.73 sqM)  


 


Glucose   (74-99)  mg/dL


 


Calcium   (8.4-10.2)  mg/dL


 


Magnesium   (1.6-2.3)  mg/dL


 


Total Bilirubin   (0.2-1.3)  mg/dL


 


AST   (17-59)  U/L


 


ALT   (4-49)  U/L


 


Alkaline Phosphatase   ()  U/L


 


Troponin I  0.045 H*  (0.000-0.034)  ng/mL


 


NT-Pro-B Natriuret Pep   pg/mL


 


Total Protein   (6.3-8.2)  g/dL


 


Albumin   (3.5-5.0)  g/dL














Disposition


Clinical Impression: 


 Elevated troponin, Congestive heart failure





Disposition: ADMITTED AS IP TO THIS HOSP


Condition: Stable


Is patient prescribed a controlled substance at d/c from ED?: No


Referrals: 


[facility] [Primary Care Provider] - 1-2 days


Time of Disposition: 07:07

## 2023-08-08 NOTE — P.CRDCN
History of Present Illness


History of present illness: 





HISTORY OF PRESENT ILLNESS:  





This is a 83-year-old male with a past medical history significant for 

congestive heart failure, diabetes, hypertension, hyperlipidemia, and 

cardiomyopathy. Patient does not follow with a cardiologist. We have been asked 

to see the patient in consultation for congestive heart failure. Patient 

examined at the bedside.  Patient presented to the hospital with a chief 

complaint of shortness of breath.  Patient was found to be in congestive heart 

failure.  He was started on IV Lasix.  He denies any chest pain or pressure.  He

denies any dizziness or lightheadedness.  Patient states he has been compliant 

with his medications. Patient underwent Lexiscan stress testing in November 2021

revealing possibility of small stress-induced reversible ischemia involving the 

apical lateral myocardium.  Patient was posterior follow up on an outpatient 

basis regarding this however he has not followed up in the office.  When the 

patient was asked about this, he states that he is not interested in seeing a 

cardiologist and does not want to pursue any further cardiac testing.





* EKG reveals sinus bradycardia with PVCs.  Left axis deviation.  Left bundle 

  branch block.


* Chest xray there is left upper lobe masslike area measuring 8.9 x 6.4 cm.  S

  table interstitial change throughout the right lung likely reflecting sequela 

  of prior infection


* Laboratory data: WBC 7.3.  Hemoglobin 11.9.  Platelet count 339.  Sodium 139. 

  Potassium 4.3.  BUN 36. Creatinine 1.67.  Troponin 0.045.  ProBNP 21,400.


* Current home cardiac medications include amlodipine 5 mrem daily, Lasix 80 mg 

  daily, simvastatin 40 mg at night


* Most recent echocardiogram obtained in October 2022 revealing ejection 

  fraction 40-45%, moderate pulmonary hypertension, mild mitral regurgitation, 

  mild aortic regurgitation, mild tricuspid regurgitation


* Cardiac catheterization history: Patient denies





REVIEW OF SYSTEMS: 


At the time of my exam:


CONSTITUTIONAL: Denies fever or chills.


HEENT: Denies blurred vision, vision changes, or eye pain. Denies hemoptysis 


CARDIOVASCULAR: Denies chest pain. Denies orthopnea. Denies PND. Denies 

palpitations


RESPIRATORY: Denies shortness of breath. 


GASTROINTESTINAL: Denies abdominal pain. Denies nausea or vomiting. 


HEMATOLOGIC: Denies bleeding disorders.


GENITOURINARY:  Denies any blood in urine.


SKIN: Denies pruitis. Denies rash.





PHYSICAL EXAM: 


VITAL SIGNS: Reviewed.


GENERAL: Well-developed in no acute distress. 


HEENT: Head is normocephalic. Pupils are equal, round. Sclerae anicteric. Mucous

membranes of the mouth are moist. Neck supple. No JVD or thyromegaly


LUNGS: Respirations even and unlabored. Lungs diminished to auscultation 

bilaterally.


HEART: Regular rate and rhythm.  S1 and S2 heard.


ABDOMEN: Soft. Nondistended. Nontender.


EXTREMITIES: Normal range of motion.  No clubbing or cyanosis.  Peripheral 

pulses intact.  Minimal lower extremity edema


NEUROLOGIC: Awake and alert. Oriented x 3. 





ASSESSMENT: 


Shortness of breath


Acute on chronic heart failure with reduced ejection fraction


Cardiomyopathy, ejection fraction 40-45%, unknown if ischemic or nonischemic


Chronic kidney disease


Hypertension


Hyperlipidemia


Diabetes





PLAN: 


Obtain 2-D echo to assess cardiac structure and function


Continue IV Lasix


Daily weights, accurate I&O, and monitoring of kidney function


Discontinue metoprolol.  Add carvedilol 3.125 mg twice a day


Add losartan 25 mg daily


Patient had abnormal stress test in 2021 and further ischemic workup was 

recommended.  Patient has not followed up in the office since that time.  When 

asked about this, patient states that he does not want to see a cardiologist and

has no interest in pursuing further cardiac testing.


Further recommendations pending patient's course








Nurse practitioner note has been reviewed by physician. Signing provider agrees 

with the documented findings, assessment, and plan of care. 








Past Medical History


Past Medical History: Heart Failure, Diabetes Mellitus, Hyperlipidemia, Thyroid 

Disorder


Additional Past Medical History / Comment(s): abnormal K+


History of Any Multi-Drug Resistant Organisms: None Reported


Additional Past Surgical History / Comment(s): Thyroidectomy


Past Anesthesia/Blood Transfusion Reactions: No Reported Reaction


Past Psychological History: No Psychological Hx Reported


Smoking Status: Former smoker


Past Alcohol Use History: None Reported


Past Drug Use History: None Reported





- Past Family History


  ** Mother


Family Medical History: Dementia


Additional Family Medical History / Comment(s): alzheimers





  ** Father


Family Medical History: Congestive Heart Failure (CHF)





Medications and Allergies


                                Home Medications











 Medication  Instructions  Recorded  Confirmed  Type


 


Levothyroxine Sodium [Synthroid] 125 mcg PO DAILY 11/16/21 08/08/23 History


 


Simvastatin [Zocor] 40 mg PO HS 11/16/21 08/08/23 History


 


metFORMIN HCL [Glucophage] 500 mg PO BID 09/21/22 08/08/23 History


 


amLODIPine [Norvasc] 5 mg PO DAILY 60 Days #60 tab 10/09/22 08/08/23 Rx


 


Furosemide [Lasix] 80 mg PO DAILY 08/08/23 08/08/23 History








                                    Allergies











Allergy/AdvReac Type Severity Reaction Status Date / Time


 


No Known Allergies Allergy   Verified 08/08/23 09:53














Physical Exam


Vitals: 


                                   Vital Signs











  Temp Pulse Pulse Resp BP BP Pulse Ox


 


 08/08/23 08:45  97.7 F   59 L  19   153/75  95


 


 08/08/23 07:19  98.1 F  55 L   18  145/71   92 L


 


 08/08/23 06:37  98.0 F  57 L   20  141/71   95


 


 08/08/23 05:30   55 L   24    94 L


 


 08/08/23 05:11     22   


 


 08/08/23 05:05  97.8 F  63   20  127/66   95








                                Intake and Output











 08/07/23 08/08/23 08/08/23





 22:59 06:59 14:59


 


Other:   


 


  Weight  95.254 kg 














Results





                                 08/08/23 05:14





                                 08/08/23 05:14


                                 Cardiac Enzymes











  08/08/23 08/08/23 Range/Units





  05:14 05:14 


 


AST  32   (17-59)  U/L


 


Troponin I   0.045 H*  (0.000-0.034)  ng/mL








                                   Coagulation











  08/08/23 Range/Units





  05:14 


 


PT  11.0  (9.0-12.0)  sec


 


APTT  22.2  (22.0-30.0)  sec








                                       CBC











  08/08/23 Range/Units





  05:14 


 


WBC  7.3  (3.8-10.6)  k/uL


 


RBC  4.06 L  (4.30-5.90)  m/uL


 


Hgb  11.9 L  (13.0-17.5)  gm/dL


 


Hct  36.6 L  (39.0-53.0)  %


 


Plt Count  339  (150-450)  k/uL








                          Comprehensive Metabolic Panel











  08/08/23 Range/Units





  05:14 


 


Sodium  139  (137-145)  mmol/L


 


Potassium  4.3  (3.5-5.1)  mmol/L


 


Chloride  103  ()  mmol/L


 


Carbon Dioxide  24  (22-30)  mmol/L


 


BUN  36 H  (9-20)  mg/dL


 


Creatinine  1.67 H  (0.66-1.25)  mg/dL


 


Glucose  164 H  (74-99)  mg/dL


 


Calcium  7.4 L  (8.4-10.2)  mg/dL


 


AST  32  (17-59)  U/L


 


ALT  22  (4-49)  U/L


 


Alkaline Phosphatase  104  ()  U/L


 


Total Protein  7.7  (6.3-8.2)  g/dL


 


Albumin  3.9  (3.5-5.0)  g/dL








                               Current Medications











Generic Name Dose Route Start Last Admin





  Trade Name Freq  PRN Reason Stop Dose Admin


 


Aspirin  81 mg  08/08/23 09:00 





  Aspirin 81 Mg  PO  





  DAILY [facility]  


 


Atorvastatin Calcium  20 mg  08/08/23 21:00 





  Atorvastatin 20 Mg Tab  PO  





  HS [facility]  


 


Carvedilol  3.125 mg  08/08/23 09:00 





  Carvedilol 3.125 Mg Tab  PO  





  BID-W/MEALS [facility]  


 


Dextrose/Water  25 ml  08/08/23 08:10 





  Dextrose 50% Syringe 50 Ml  IVP  





  PER PROTOCOL PRN  





  Hypoglycemia  





  Protocol  


 


Dextrose/Water  50 ml  08/08/23 08:10 





  Dextrose 50% Syringe 50 Ml  IVP  





  PER PROTOCOL PRN  





  Hypoglycemia  





  Protocol  


 


Furosemide  40 mg  08/08/23 09:00 





  Furosemide 10 Mg/Ml 4 Ml Vial  IV  





  Q12HR [facility]  


 


Heparin Sodium (Porcine)  5,000 unit  08/08/23 09:00 





  Heparin Sodium,Porcine 5,000 Unit/Ml 1 Ml Vial  SQ  





  Q12HR [facility]  


 


Insulin Aspart  0 unit  08/08/23 12:30 





  Insulin Aspart (Novolog) 100 Unit/Ml Vial  SQ  





  [facility]  





  Protocol  


 


Levothyroxine Sodium  125 mcg  08/09/23 06:30 





  Levothyroxine 125 Mcg Tab  PO  





  DAILY@0630 [facility]  


 


Losartan Potassium  25 mg  08/08/23 09:00 





  Losartan 25 Mg Tab  PO  





  DAILY [facility]  


 


Naloxone HCl  0.2 mg  08/08/23 07:00 





  Naloxone 0.4 Mg/Ml 1 Ml Vial  IV  





  Q2M PRN  





  Opioid Reversal  








                                Intake and Output











 08/07/23 08/08/23 08/08/23





 22:59 06:59 14:59


 


Other:   


 


  Weight  95.254 kg 








                                        





                                 08/08/23 05:14 





                                 08/08/23 05:14

## 2023-08-08 NOTE — P.HPIM
History of Present Illness


H&P Date: 08/08/23





History of Presenting Illness:





Patient is a pleasant 83-year-old male with a past medical history of 

cardiomyopathy, chronic systolic heart failure with previously known EF of 40-

45%, hypertension, hyperlipidemia, chronic kidney disease stage IIIB hypothyro

idism, and non-insulin-dependent diabetes mellitus.  He presented to the 

emergency department with a chief complaint of shortness of breath.  Patient 

reports he has noticed increasing shortness of breath over the past few days, 

almost 1 week.  He reports this shortness of breath has been accompanied by 

cough But denies any other associated symptoms including headache, 

lightheadedness, dizziness, fever, chills, diaphoresis, chest pain or 

palpitations, abdominal pain, nausea, vomiting, or experiencing any noted 

swelling/weakness/tingling in his extremities.  Patient does report that his 

left lower extremity has been swollen for approximately 2 weeks but states he 

thinks he might have "twisted his ankle".  He underwent full evaluation in the 

emergency department.  Vital signs reviewed.  Blood pressure 127/66, heart rate 

63, respiratory rate 20, temp 97.8F, and SpO2 of 95% on room air.  EKG 

completed showing sinus bradycardia at 58 bpm with frequent PVCs and a left 

bundle branch block, left bundle branch block previously known and showing no 

changes when compared to EKG completed on 10/7/22 upon personal review and 

interpretation.  Chest x-ray completed showing left upper lobe masslike area 

measuring approximately 8.9 x 6.4 cm.  Labs completed and reviewed.  Initial 

troponin 0.045.  Normocytic anemia with hemoglobin 11.9.  Coagulation profile 

normal findings.  BMP consistent with stage III chronic kidney disease with BUN 

of 36, creatinine 1.67, and GFR of 37 which is at baseline creatinine levels.  

Liver profile showing no significant abnormalities.  ProBNP elevated at 21,400. 

Discussed patient complains, physical exam findings, laboratory analysis, and 

imaging results in detail with the ED physician.  Patient admitted under our 

services with consultation to cardiology.





Review of systems:





Pertinent positives and negatives as discussed in HPI, a complete review of 

systems was performed and all other systems are negative.





Physical exam:





Vital signs reviewed and stable. 


General: Nontoxic, no distress and appears stated age.  


Derm: Skin warm and dry, normal coloration for ethnicity.


Head: Atraumatic, normocephalic and symmetric.  


Eyes: EOMs intact, no lid lag, and anicteric sclera


Mouth: no lip lesions, mucus membranes moist


Cardiovascular: regular rate and rhythm with normal S1S2, systolic murmur, 

positive posterior tibial pulses bilaterally, and cap refill < 2 seconds.  


Lungs: Respirations even, regular, and unlabored on room air. Lungs CTA 

bilaterally, no rhonchi, no rales, no wheezing, and no accessory muscle usage. 


Abdominal: soft, nontender to palpation, no guarding, no appreciable 

organomegaly


Ext: ROM intact. No gross muscle atrophy, left lower extremity 1+ pitting edema 

right lower extremity no edema.  no contractures


Neuro: Speech clear, face symmetrical and CN II-XII grossly intact with no noted

focal neuro deficits


Psych: Alert and oriented to person, place, time, and situation. Appropriate and

pleasant affect. 





Assessment and Plan of Care:





Patient is a pleasant 83-year-old male with a past medical history of chronic 

systolic heart failure with previously known EF 40-45% and presented to the 

emergency department with progressively worsening shortness of breath over the 

past few days.





-Vital signs reviewed.  Blood pressure 127/66, heart rate 63, respiratory rate 

20, temp 97.8F, and SpO2 of 95% on room air.  


-EKG completed showing sinus bradycardia at 58 bpm with frequent PVCs and a left

bundle branch block, left bundle branch block previously known and showing no 

changes when compared to EKG completed on 10/7/22 upon personal review and 

interpretation.  


-Chest x-ray completed showing left upper lobe masslike area measuring 

approximately 8.9 x 6.4 cm.  


-Labs completed and reviewed.  Initial troponin 0.045.  Normocytic anemia with 

hemoglobin 11.9.  Coagulation profile normal findings.  BMP consistent with 

stage III chronic kidney disease with BUN of 36, creatinine 1.67, and GFR of 37 

which is at baseline creatinine levels.  Liver profile showing no significant 

abnormalities.  ProBNP elevated at 21,400.  


-Discussed patient complains, physical exam findings, laboratory analysis, and 

imaging results in detail with the ED physician.  


-Patient admitted under our services to general medical unit with telemetry with

consultation to cardiology.





Acute on chronic systolic heart failure with previously known EF of 40-45%


Elevated troponin, chronic likely secondary to chronic kidney disease and 

chronic systolic heart failure


Cardiomyopathy


History of CAD


Hypertension


Hyperlipidemia


Chronic kidney disease stage IIIB


Hypothyroidism


Non-insulin-dependent diabetes mellitus


-Cardiology consulted, appreciate further recommendations


-Telemetry monitoring


-Trend troponins


-Daily weights


-Close monitoring of I's and O's


-Cardiac diet


-Lasix 40 mg IVP every 12 hours


-Hold Juardiance and Glucophage and place patient on glycemic protocol with 

NovoLog sliding scale


-Lipid profile and Hgb A1c with a.m. labs. 


-Continued close monitoring of electrolytes while diuresing. 








The patient is admitted with an anticipated greater than 2 midnight stay for 

evaluation of acute on chronic systolic heart failure


CODE STATUS: Full code


DVT prophylaxis: Heparin


Discussed with: Patient, RN, ED physician, and cardiac NP


Anticipated discharge date: Clinical course to determine


Anticipated discharge place: Home


Patient was seen independently by Nurse Practitioner.


This document was prepared using Dragon dictation software.  Please allow for 

errors in transcription while rare they do occur.





Luiz Valentine NP rendered care for this patient independently, reviewed the 

findings and plan as documented in the note above.  I did not physically speak 

with or examine the patient on this date. 





Past Medical History


Past Medical History: Heart Failure, Diabetes Mellitus, Hyperlipidemia, Thyroid 

Disorder


Additional Past Medical History / Comment(s): abnormal K+


History of Any Multi-Drug Resistant Organisms: None Reported


Additional Past Surgical History / Comment(s): Thyroidectomy


Past Anesthesia/Blood Transfusion Reactions: No Reported Reaction


Past Psychological History: No Psychological Hx Reported


Smoking Status: Former smoker


Past Alcohol Use History: None Reported


Past Drug Use History: None Reported





- Past Family History


  ** Mother


Family Medical History: Dementia


Additional Family Medical History / Comment(s): alzheimers





  ** Father


Family Medical History: Congestive Heart Failure (CHF)





Medications and Allergies


                                Home Medications











 Medication  Instructions  Recorded  Confirmed  Type


 


Levothyroxine Sodium [Synthroid] 125 mcg PO DAILY 11/16/21 08/08/23 History


 


Simvastatin [Zocor] 40 mg PO HS 11/16/21 08/08/23 History


 


metFORMIN HCL [Glucophage] 500 mg PO BID 09/21/22 08/08/23 History


 


amLODIPine [Norvasc] 5 mg PO DAILY 60 Days #60 tab 10/09/22 08/08/23 Rx


 


Empagliflozin [Jardiance] 25 mg PO DAILY 08/08/23 08/08/23 History


 


Furosemide [Lasix] 80 mg PO DAILY 08/08/23 08/08/23 History








                                    Allergies











Allergy/AdvReac Type Severity Reaction Status Date / Time


 


No Known Allergies Allergy   Verified 08/08/23 09:53














Physical Exam


Vitals: 


                                   Vital Signs











  Temp Pulse Resp BP Pulse Ox


 


 08/08/23 07:19  98.1 F  55 L  18  145/71  92 L


 


 08/08/23 06:37  98.0 F  57 L  20  141/71  95


 


 08/08/23 05:30   55 L  24   94 L


 


 08/08/23 05:11    22  


 


 08/08/23 05:05  97.8 F  63  20  127/66  95








                                Intake and Output











 08/07/23 08/08/23 08/08/23





 22:59 06:59 14:59


 


Other:   


 


  Weight  95.254 kg 














Results


CBC & Chem 7: 


                                 08/11/23 05:57





                                 08/11/23 05:57


Labs: 


                  Abnormal Lab Results - Last 24 Hours (Table)











  08/08/23 08/08/23 08/08/23 Range/Units





  05:14 05:14 05:14 


 


RBC  4.06 L    (4.30-5.90)  m/uL


 


Hgb  11.9 L    (13.0-17.5)  gm/dL


 


Hct  36.6 L    (39.0-53.0)  %


 


BUN   36 H   (9-20)  mg/dL


 


Creatinine   1.67 H   (0.66-1.25)  mg/dL


 


Glucose   164 H   (74-99)  mg/dL


 


Calcium   7.4 L   (8.4-10.2)  mg/dL


 


Troponin I    0.045 H*  (0.000-0.034)  ng/mL

## 2023-08-08 NOTE — CA
Transthoracic Echo Report 

 Name: Herb Montana 

 MRN:    [id number] 

 Age:    83     Gender:     M 

 

 :    1940 

 Exam Date:     2023 10:40 

 Exam Location: [facility] 

 Ht (in):     70     Wt (lb):     210 

 Ordering Physician:        Luiz Valentine 

 Attending/Referring Phys: 

 Technician         Darcie Bernal RDCS 

 Procedure CPT: 

 Indications:       chf exacerbation, previous EF 40-45% 

 

 Cardiac Hx: 

 Technical Quality:      Good 

 Contrast 1:                                Total Dose (mL): 

 Contrast 2:                                Total Dose (mL): 

 

 MEASUREMENTS  (Male / Female) Normal Values 

 2D ECHO 

 LV Diastolic Diameter PLAX        5.9 cm                4.2 - 5.9 / 3.9 - 5.3 cm 

 LV Systolic Diameter PLAX         4.0 cm                 

 IVS Diastolic Thickness           1.3 cm                0.6 - 1.0 / 0.6 - 0.9 cm 

 LVPW Diastolic Thickness          1.1 cm                0.6 - 1.0 / 0.6 - 0.9 cm 

 LV Relative Wall Thickness        0.4                    

 RV Internal Dim ED PLAX           2.7 cm                 

 LA Systolic Diameter LX           4.2 cm                3.0 - 4.0 / 2.7 - 3.8 cm 

 LV Diastolic Volume MOD 4C        113.5 cm???              

 LV Systolic Volume MOD 4C         59.8 cm???               

 LV Ejection Fraction MOD 4C       47.3 %                 

 LV Cardiac Index MOD 4C           1935.1 cm???/min???m???      

 LV Diastolic Length 4C            9.0 cm                 

 LV Systolic Length 4C             7.8 cm                 

 LV Diastolic Volume MOD 2C        114.7 cm???              

 LV Systolic Volume MOD 2C         61.6 cm???               

 LV Ejection Fraction MOD 2C       46.3 %                 

 LV Cardiac Index MOD 2C           1912.2 cm???/min???m???      

 LV Diastolic Length 2C            9.5 cm                 

 LV Systolic Length 2C             7.8 cm                 

 LA Volume                         56.3 cm???              18 - 58 / 22 - 52 cm??? 

 

 M-MODE 

 Aortic Root Diameter MM           3.4 cm                 

 MV E Point Septal Separation      1.0 cm                 

 AV Cusp Separation MM             2.0 cm                 

 

 DOPPLER 

 AV Peak Velocity                  208.8 cm/s             

 AV Peak Gradient                  17.4 mmHg              

 AV Mean Velocity                  135.8 cm/s             

 AV Mean Gradient                  8.6 mmHg               

 AV Velocity Time Integral         49.5 cm                

 LVOT Peak Velocity                121.7 cm/s             

 LVOT Peak Gradient                5.9 mmHg               

 MV Area PHT                       4.0 cm???                

 Mitral E Point Velocity           134.6 cm/s             

 Mitral A Point Velocity           110.9 cm/s             

 Mitral E to A Ratio               1.2                    

 MV Deceleration Time              191.7 ms               

 MV E' Velocity                    5.8 cm/s               

 Mitral E to MV E' Ratio           23.1                   

 TR Peak Velocity                  360.0 cm/s             

 TR Peak Gradient                  51.9 mmHg              

 Right Ventricular Systolic Press  55.3 mmHg              

 

 

 FINDINGS 

 Left Ventricle 

 Mildly increased septal wall thickness. Left ventricular ejection fraction is  

 estimated at 40-45 %. Left ventricular cavity size uppear normal.  There is  

 atypical septal motion noted.  There is inferior wall hypokinesia 

 

 Right Ventricle 

 Normal right ventricular size. Moderate to severe pulmonary hypertension. Right  

 ventricular systolic pressure estimated at  50 mm hg. 

 

 Right Atrium 

 Normal right atrial size. 

 

 Left Atrium 

 Mildly increased left atrial diameter. 

 

 Mitral Valve 

 Structurally normal mitral valve. Mild-to-moderate mitral regurgitation. 

 

 Aortic Valve 

 Trileaflet aortic valve. There is mild AOV gradient of 9 mmHg 

 

 Tricuspid Valve 

 Structurally normal tricuspid valve. Mild-to-moderate tricuspid regurgitation. 

 

 Pulmonic Valve 

 Structurally normal pulmonic valve. No pulmonic regurgitation. 

 

 Pericardium 

 Normal pericardium. No pericardial effusion. 

 

 Aorta 

 Normal size aortic root and proximal ascending aorta. 

 

 CONCLUSIONS 

 Left ventricle is at upper limits of normal with atypical septal motion  

 inferior wall hypokinesia ejection fraction is about 40-45%.  There is a lot of  

 ectopy.  Right ventricle is at upper limits of normal with moderate pulmonary  

 hypertension.  There is mild to moderate mitral and tricuspid regurgitation.   

 No significant gradient across aortic valve no pericardial effusion. 

 Previewed by:  

 Dr. Tesfaye Salazar MD 

 (Electronically Signed) 

 Final Date:      2023 11:29

## 2023-08-09 LAB
ALBUMIN SERPL-MCNC: 3.5 G/DL (ref 3.5–5)
ALP SERPL-CCNC: 109 U/L (ref 38–126)
ALT SERPL-CCNC: 17 U/L (ref 4–49)
ANION GAP SERPL CALC-SCNC: 12 MMOL/L
AST SERPL-CCNC: 19 U/L (ref 17–59)
BUN SERPL-SCNC: 42 MG/DL (ref 9–20)
CALCIUM SPEC-MCNC: 7.2 MG/DL (ref 8.4–10.2)
CHLORIDE SERPL-SCNC: 100 MMOL/L (ref 98–107)
CO2 SERPL-SCNC: 26 MMOL/L (ref 22–30)
ERYTHROCYTE [DISTWIDTH] IN BLOOD BY AUTOMATED COUNT: 3.68 M/UL (ref 4.3–5.9)
ERYTHROCYTE [DISTWIDTH] IN BLOOD: 14.5 % (ref 11.5–15.5)
GLUCOSE BLD-MCNC: 128 MG/DL (ref 70–110)
GLUCOSE BLD-MCNC: 156 MG/DL (ref 70–110)
GLUCOSE BLD-MCNC: 159 MG/DL (ref 70–110)
GLUCOSE BLD-MCNC: 216 MG/DL (ref 70–110)
GLUCOSE SERPL-MCNC: 146 MG/DL (ref 74–99)
HCT VFR BLD AUTO: 33.1 % (ref 39–53)
HGB BLD-MCNC: 10.8 GM/DL (ref 13–17.5)
MAGNESIUM SPEC-SCNC: 2.2 MG/DL (ref 1.6–2.3)
MCH RBC QN AUTO: 29.3 PG (ref 25–35)
MCHC RBC AUTO-ENTMCNC: 32.6 G/DL (ref 31–37)
MCV RBC AUTO: 89.8 FL (ref 80–100)
PLATELET # BLD AUTO: 283 K/UL (ref 150–450)
POTASSIUM SERPL-SCNC: 3.5 MMOL/L (ref 3.5–5.1)
PROT SERPL-MCNC: 6.9 G/DL (ref 6.3–8.2)
SODIUM SERPL-SCNC: 138 MMOL/L (ref 137–145)
WBC # BLD AUTO: 7.7 K/UL (ref 3.8–10.6)

## 2023-08-09 RX ADMIN — HEPARIN SODIUM SCH UNIT: 5000 INJECTION, SOLUTION INTRAVENOUS; SUBCUTANEOUS at 21:26

## 2023-08-09 RX ADMIN — INSULIN ASPART SCH UNIT: 100 INJECTION, SOLUTION INTRAVENOUS; SUBCUTANEOUS at 12:57

## 2023-08-09 RX ADMIN — LOSARTAN POTASSIUM SCH MG: 25 TABLET, FILM COATED ORAL at 10:29

## 2023-08-09 RX ADMIN — LEVOTHYROXINE SODIUM SCH MCG: 0.12 TABLET ORAL at 07:05

## 2023-08-09 RX ADMIN — ISOSORBIDE MONONITRATE SCH MG: 30 TABLET, EXTENDED RELEASE ORAL at 10:29

## 2023-08-09 RX ADMIN — ASPIRIN 81 MG CHEWABLE TABLET SCH MG: 81 TABLET CHEWABLE at 10:30

## 2023-08-09 RX ADMIN — ATORVASTATIN CALCIUM SCH MG: 20 TABLET, FILM COATED ORAL at 21:25

## 2023-08-09 RX ADMIN — INSULIN ASPART SCH: 100 INJECTION, SOLUTION INTRAVENOUS; SUBCUTANEOUS at 17:05

## 2023-08-09 RX ADMIN — INSULIN ASPART SCH UNIT: 100 INJECTION, SOLUTION INTRAVENOUS; SUBCUTANEOUS at 21:26

## 2023-08-09 RX ADMIN — HEPARIN SODIUM SCH UNIT: 5000 INJECTION, SOLUTION INTRAVENOUS; SUBCUTANEOUS at 10:30

## 2023-08-09 RX ADMIN — INSULIN ASPART SCH UNIT: 100 INJECTION, SOLUTION INTRAVENOUS; SUBCUTANEOUS at 07:05

## 2023-08-09 NOTE — P.PN
Subjective





HISTORY OF PRESENT ILLNESS:  





This is a 83-year-old male with a past medical history significant for 

congestive heart failure, diabetes, hypertension, hyperlipidemia, and 

cardiomyopathy. Patient does not follow with a cardiologist. We have been asked 

to see the patient in consultation for congestive heart failure. Patient ex

amined at the bedside.  Patient presented to the hospital with a chief complaint

of shortness of breath.  Patient was found to be in congestive heart failure.  

He was started on IV Lasix.  He denies any chest pain or pressure.  He denies 

any dizziness or lightheadedness.  Patient states he has been compliant with his

medications. Patient underwent Lexiscan stress testing in November 2021 

revealing possibility of small stress-induced reversible ischemia involving the 

apical lateral myocardium.  Patient was posterior follow up on an outpatient 

basis regarding this however he has not followed up in the office.  When the 

patient was asked about this, he states that he is not interested in seeing a 

cardiologist and does not want to pursue any further cardiac testing.





* EKG reveals sinus bradycardia with PVCs.  Left axis deviation.  Left bundle 

  branch block.


* Chest xray there is left upper lobe masslike area measuring 8.9 x 6.4 cm.  

  Stable interstitial change throughout the right lung likely reflecting sequela

  of prior infection


* Laboratory data: WBC 7.3.  Hemoglobin 11.9.  Platelet count 339.  Sodium 139. 

  Potassium 4.3.  BUN 36. Creatinine 1.67.  Troponin 0.045.  ProBNP 21,400.


* Current home cardiac medications include amlodipine 5 mrem daily, Lasix 80 mg 

  daily, simvastatin 40 mg at night


* Most recent echocardiogram obtained in October 2022 revealing ejection 

  fraction 40-45%, moderate pulmonary hypertension, mild mitral regurgitation, 

  mild aortic regurgitation, mild tricuspid regurgitation


* Cardiac catheterization history: Patient denies





8/9/2023


Patient examined this morning.  He is sitting up in a chair.  Patient denies 

chest pain or pressure.  He denies shortness of breath.  He remains on IV Lasix.

 Creatinine today 2.03.  Vital signs are stable.  Echocardiogram completed 

revealing ejection fraction 40-45%, inferior wall hypokinesia, mild-to-moderate 

mitral and tricuspid regurgitation.





PHYSICAL EXAM: 


VITAL SIGNS: Reviewed.


GENERAL: Well-developed in no acute distress. 


HEENT: Head is normocephalic. Pupils are equal, round. Sclerae anicteric. Mucous

membranes of the mouth are moist. Neck supple. No JVD or thyromegaly


LUNGS: Respirations even and unlabored. Lungs diminished to auscultation 

bilaterally.


HEART: Regular rate and rhythm.  S1 and S2 heard.


ABDOMEN: Soft. Nondistended. Nontender.


EXTREMITIES: Normal range of motion.  No clubbing or cyanosis.  Peripheral 

pulses intact.  Minimal lower extremity edema


NEUROLOGIC: Awake and alert. Oriented x 3. 





ASSESSMENT: 


Shortness of breath


Acute on chronic heart failure with reduced ejection fraction


Cardiomyopathy, ejection fraction 40-45%, unknown if ischemic or nonischemic


Chronic kidney disease


Hypertension


Hyperlipidemia


Diabetes





PLAN: 


Discontinue IV Lasix


Resume oral Lasix 80 mg daily starting tomorrow


Increase carvedilol to 6.25 mg twice a day


Discontinue Isordil.  Begin Imdur 30 mg daily


Continue additional cardiac medications


Continue to monitor blood pressure


Continue to monitor kidney function


Patient had abnormal stress test in 2021 and further ischemic workup was 

recommended.  Patient has not followed up in the office since that time.  When 

asked about this, patient states that he does not want to see a cardiologist and

has no interest in pursuing further cardiac testing.  Patient was asked about 

this again today on rounds on 08/09/2023 and patient states he still does not 

want to see a cardiologist or have any further testing completed.  Patient 

states "I am 83 years old. I don't want anything else done"


Further recommendations pending patient's course








Nurse practitioner note has been reviewed by physician. Signing provider agrees 

with the documented findings, assessment, and plan of care. 








Objective





- Vital Signs


Vital signs: 


                                   Vital Signs











Temp  98.2 F   08/08/23 20:00


 


Pulse  84   08/09/23 04:00


 


Resp  18   08/09/23 04:00


 


BP  145/66   08/09/23 04:00


 


Pulse Ox  96   08/09/23 07:42


 


FiO2      








                                 Intake & Output











 08/08/23 08/09/23 08/09/23





 18:59 06:59 18:59


 


Intake Total 118 500 240


 


Output Total 600  


 


Balance -482 500 240


 


Weight 97.976 kg 97.8 kg 


 


Intake:   


 


  Oral 118 500 240


 


Output:   


 


  Urine 600  


 


Other:   


 


  Voiding Method Urinal Urinal 














- Labs


CBC & Chem 7: 


                                 08/09/23 06:02





                                 08/09/23 06:02


Labs: 


                  Abnormal Lab Results - Last 24 Hours (Table)











  08/08/23 08/08/23 08/08/23 Range/Units





  11:27 12:10 16:45 


 


RBC     (4.30-5.90)  m/uL


 


Hgb     (13.0-17.5)  gm/dL


 


Hct     (39.0-53.0)  %


 


BUN     (9-20)  mg/dL


 


Creatinine     (0.66-1.25)  mg/dL


 


Glucose     (74-99)  mg/dL


 


POC Glucose (mg/dL)  169 H   160 H  ()  mg/dL


 


Hemoglobin A1c     (<=6.0)  %


 


Calcium     (8.4-10.2)  mg/dL


 


Troponin I   0.045 H*   (0.000-0.034)  ng/mL














  08/08/23 08/09/23 08/09/23 Range/Units





  19:42 06:02 06:02 


 


RBC    3.68 L  (4.30-5.90)  m/uL


 


Hgb    10.8 L  (13.0-17.5)  gm/dL


 


Hct    33.1 L  (39.0-53.0)  %


 


BUN     (9-20)  mg/dL


 


Creatinine     (0.66-1.25)  mg/dL


 


Glucose     (74-99)  mg/dL


 


POC Glucose (mg/dL)  201 H    ()  mg/dL


 


Hemoglobin A1c   8.8 H   (<=6.0)  %


 


Calcium     (8.4-10.2)  mg/dL


 


Troponin I     (0.000-0.034)  ng/mL














  08/09/23 08/09/23 Range/Units





  06:02 06:10 


 


RBC    (4.30-5.90)  m/uL


 


Hgb    (13.0-17.5)  gm/dL


 


Hct    (39.0-53.0)  %


 


BUN  42 H   (9-20)  mg/dL


 


Creatinine  2.03 H   (0.66-1.25)  mg/dL


 


Glucose  146 H   (74-99)  mg/dL


 


POC Glucose (mg/dL)   156 H  ()  mg/dL


 


Hemoglobin A1c    (<=6.0)  %


 


Calcium  7.2 L   (8.4-10.2)  mg/dL


 


Troponin I    (0.000-0.034)  ng/mL

## 2023-08-09 NOTE — CT
EXAMINATION TYPE: CT chest wo con

 

DATE OF EXAM: 8/9/2023

 

COMPARISON: Previous day chest x-ray

 

HISTORY: Evaluated for mass

 

CT DLP: 485 mGycm

 

Unenhanced CT of the chest was performed with lung and mediastinal window settings submitted.  The la
ck of contrast limits evaluation of the vascular, mediastinal and parenchymal structures including th
e upper abdomen.

 

LUNGS: There is pseudomass noted within the left upper lobe with fluid trapped within the fissure selena
suring 5.8 x 3.5 cm. There is small left-sided pleural effusion with maximal AP dimension of 1.3 cm. 
Small-to-moderate right-sided pleural effusion with maximal AP dimension of 4.7 cm. There is patchy i
nfiltrate seen within the upper lobes as well as cardiomegaly and pulmonary venous congestion. Findin
gs may reflect congestive failure. Infiltrates of other etiology not excluded.

 

MEDIASTINUM/KRISTIAN:  Thoracic aorta is of normal caliber with limited evaluation given lack of contrast
.  The heart is enlarged.  No evidence for mediastinal mass. Nonspecific right paratracheal adenopath
y measuring 1.9 cm in short axis. Subcarinal fullness may reflect additional adenopathy measuring 1.8
 cm.

 

UPPER ABDOMEN:  No significant abnormality is seen.

 

OTHER: No significant other abnormality.

 

IMPRESSION:

 

1.  There is pseudomass noted within the left upper lobe with fluid trapped within the fissure measur
ing 5.8 x 3.5 cm. 

2. Scattered infiltrates with bilateral pleural effusions and pulmonary venous congestion suspicious 
for congestive failure. Infiltrates of other etiology including pneumonia not excluded. Correlate cli
nically and progress studies are advised.

3. Nonspecific mediastinal adenopathy.

## 2023-08-09 NOTE — P.PN
Subjective


Progress Note Date: 08/09/23





Hospital course:





Patient is a pleasant 83-year-old male with a past medical history of 

cardiomyopathy, chronic systolic heart failure with previously known EF of 40-

45%, hypertension, hyperlipidemia, chronic kidney disease stage IIIB 

hypothyroidism, and non-insulin-dependent diabetes mellitus.  He presented to 

the emergency department with a chief complaint of shortness of breath.  Patient

reports he has noticed increasing shortness of breath over the past few days, 

almost 1 week.  He reports this shortness of breath has been accompanied by 

cough But denies any other associated symptoms including headache, 

lightheadedness, dizziness, fever, chills, diaphoresis, chest pain or 

palpitations, abdominal pain, nausea, vomiting, or experiencing any noted swell

ing/weakness/tingling in his extremities.  Patient does report that his left 

lower extremity has been swollen for approximately 2 weeks but states he thinks 

he might have "twisted his ankle".  He underwent full evaluation in the 

emergency department.  Vital signs reviewed.  Blood pressure 127/66, heart rate 

63, respiratory rate 20, temp 97.8F, and SpO2 of 95% on room air.  EKG 

completed showing sinus bradycardia at 58 bpm with frequent PVCs and a left 

bundle branch block, left bundle branch block previously known and showing no 

changes when compared to EKG completed on 10/7/22 upon personal review and 

interpretation.  Chest x-ray completed showing left upper lobe masslike area 

measuring approximately 8.9 x 6.4 cm.  Labs completed and reviewed.  Initial 

troponin 0.045.  Normocytic anemia with hemoglobin 11.9.  Coagulation profile 

normal findings.  BMP consistent with stage III chronic kidney disease with BUN 

of 36, creatinine 1.67, and GFR of 37 which is at baseline creatinine levels.  

Liver profile showing no significant abnormalities.  ProBNP elevated at 21,400. 

Patient was admitted under our services with consultation to cardiology.  Left 

lower extremity Doppler was completed secondary to mild swelling and was 

negative for DVT.  Troponins trended and flat resulting at 0.045, 0.050, and 

0.045. Echocardiogram completed and report reviewed showing impaired EF of 40-

45% with inferior wall hypokinesia, moderate pulmonary hypertension and mild to 

moderate mitral and tricuspid regurgitation. Secondary to initial findings of 

left upper lobe masslike area measuring approximately 8.9 x 6.4 cm reported on 

chest x-ray, placed order for CT chest without contrast. CT chest without 

contrast showing a pseudomass noted within the left upper lobe showing fluid 

trapped within the fissure measuring approximately 5.8 x 3.5 cm along with 

scattered infiltrates and bilateral pleural effusions and pulmonary venous 

congestion consistent with congestive heart failure along with nonspecific 

mediastinal adenopathy.  Pulmonology consulted at this time.





Physical exam:





Patient seen and fully evaluated at bedside.  He reports continued shortness of 

breath and had noted conversational dyspnea after 4-5 words.  Continues with 

coarse cough.





Vital signs reviewed and stable. 


General: Nontoxic, no distress and appears stated age.  


Derm: Skin warm and dry, normal coloration for ethnicity.


Head: Atraumatic, normocephalic and symmetric.  


Eyes: EOMs intact, no lid lag, and anicteric sclera


Mouth: no lip lesions, mucus membranes moist


Cardiovascular: regular rate and rhythm with normal S1S2, systolic murmur, 

positive posterior tibial pulses bilaterally, and cap refill < 2 seconds.  


Lungs: Respirations even, regular, and unlabored on room air. Lungs CTA with 

bibasilar crackles and soft expiratory wheezes.  Coarse nonproductive cough 

noted during assessment.  No accessory muscle usage.  Patient did have noted 

conversational dyspnea after 4-5 words.


Abdominal: soft, nontender to palpation, no guarding, no appreciable 

organomegaly


Ext: ROM intact. No gross muscle atrophy, left lower extremity 1+ pitting edema 

right lower extremity no edema.  no contractures


Neuro: Speech clear, face symmetrical and CN II-XII grossly intact with no noted

focal neuro deficits


Psych: Alert and oriented to person, place, time, and situation. Appropriate and

pleasant affect. 





Assessment and Plan of Care:





Patient is a pleasant 83-year-old male with a past medical history of chronic 

systolic heart failure with previously known EF 40-45% and presented to the 

emergency department on 8/8/23 with progressively worsening shortness of breath 

over the past few days.  He was admitted for acute on chronic systolic heart 

failure exacerbation and underwent IV diuresis with Lasix.  Patient had 

successful diuresis but had elevation in renal function and IV diuretic 

discontinued at this time and patient started on oral Lasix to begin tomorrow. 





Acute on chronic systolic heart failure with previously known EF of 40-45%


Pseudomass left upper lobe


Acute kidney injury on chronic kidney disease


Moderate pulmonary hypertension


Elevated troponin, chronic secondary to chronic kidney disease and chronic 

systolic heart failure


Cardiomyopathy


History of CAD


Hypertension


Hyperlipidemia


Chronic kidney disease stage IIIB


Hypothyroidism


Non-insulin-dependent diabetes mellitus





-Patient had successful diuresis with IV Lasix but this also resulted in an 

acute kidney injury on stage III B chronic kidney disease and therefore IV 

diuretic discontinued at this time and patient started on oral Lasix to begin 

tomorrow. 


-Cardiology following and discussed plan of care with cardiac NP, cardiology 

recommending holding Lasix 1 day and resuming oral dose of Lasix 80 mg daily 

beginning tomorrow and also increased Coreg to 6.25 mg twice daily and starting 

patient on Imdur 30 mg daily. 


-Echocardiogram completed and report reviewed showing impaired EF of 40-45% with

inferior wall hypokinesia, moderate pulmonary hypertension and mild to moderate 

mitral and tricuspid regurgitation.


-Secondary to initial findings of left upper lobe masslike area measuring 

approximately 8.9 x 6.4 cm reported on chest x-ray, placed order for CT chest 

without contrast. 


-CT chest without contrast showing a pseudomass noted within the left upper lobe

showing fluid trapped within the fissure measuring approximately 5.8 x 3.5 cm 

along with scattered infiltrates and bilateral pleural effusions and pulmonary 

venous congestion consistent with congestive heart failure along with 

nonspecific mediastinal adenopathy.


-Called and discussed with pulmonologist NP and order placed for pulmonary 

consult.


-Continue Telemetry monitoring


-Continued Daily weights and Close monitoring of I's and O's


-Cardiac diet


-Continue glycemic protocol with NovoLog sliding scale


-Continued close monitoring of electrolytes while diuresing. 


-Patient to continue current cardiac medication regimen with aspirin 81 mg d

aily, atorvastatin 20 mg daily, carvedilol 6.25 mg twice daily, Lasix 80 mg 

daily, hydralazine 25 mg 3 times daily, isosorbide mononitrate 30 mg daily, and 

losartan 25 mg daily.





Data review:


-Morning labs reviewed.  CBC showing normocytic anemia with hemoglobin stable at

10.8.  BMP revealing acute kidney injury on stage III B chronic kidney disease 

with BUN 42, creatinine 2.03, and GFR of 30.


-Vital signs stable blood pressure 112/49, heart rate 61, respiratory rate 18, 

temp 97.8F, and SpO2 of 93% on room air.





Imaging reviewed:


-Echocardiogram completed and report reviewed showing impaired EF of 40-45% with

inferior wall hypokinesia, moderate pulmonary hypertension and mild to moderate 

mitral and tricuspid regurgitation.


-CT chest without contrast showing a pseudomass noted within the left upper lobe

showing fluid trapped within the fissure measuring approximately 5.8 x 3.5 cm 

along with scattered infiltrates and bilateral pleural effusions and pulmonary 

venous congestion consistent with congestive heart failure along with 

nonspecific mediastinal adenopathy.








The patient is admitted with an anticipated greater than 2 midnight stay for 

evaluation of acute on chronic systolic heart failure


CODE STATUS: Full code


DVT prophylaxis: Heparin


Discussed with: Patient, RN, cardiac NP and pulmonary NP 


Anticipated discharge date: Clinical course to determine


Anticipated discharge place: Home


Patient was seen independently by Nurse Practitioner.


This document was prepared using Dragon dictation software.  Please allow for 

errors in transcription while rare they do occur.





Luiz Valentine, NP rendered care for this patient independently, reviewed the 

findings and plan as documented in the note above.  I did not physically speak 

with or examine the patient on this date. 





Objective





- Vital Signs


Vital signs: 


                                   Vital Signs











Temp  98.2 F   08/08/23 20:00


 


Pulse  84   08/09/23 04:00


 


Resp  18   08/09/23 04:00


 


BP  145/66   08/09/23 04:00


 


Pulse Ox  96   08/09/23 07:42


 


FiO2      








                                 Intake & Output











 08/08/23 08/09/23 08/09/23





 18:59 06:59 18:59


 


Intake Total 118 500 240


 


Output Total 600  


 


Balance -482 500 240


 


Weight 97.976 kg 97.8 kg 


 


Intake:   


 


  Oral 118 500 240


 


Output:   


 


  Urine 600  


 


Other:   


 


  Voiding Method Urinal Urinal 














- Labs


CBC & Chem 7: 


                                 08/11/23 05:57





                                 08/11/23 05:57


Labs: 


                  Abnormal Lab Results - Last 24 Hours (Table)











  08/08/23 08/08/23 08/08/23 Range/Units





  09:22 11:27 12:10 


 


RBC     (4.30-5.90)  m/uL


 


Hgb     (13.0-17.5)  gm/dL


 


Hct     (39.0-53.0)  %


 


BUN     (9-20)  mg/dL


 


Creatinine     (0.66-1.25)  mg/dL


 


Glucose     (74-99)  mg/dL


 


POC Glucose (mg/dL)   169 H   ()  mg/dL


 


Calcium     (8.4-10.2)  mg/dL


 


Troponin I  0.050 H*   0.045 H*  (0.000-0.034)  ng/mL














  08/08/23 08/08/23 08/09/23 Range/Units





  16:45 19:42 06:02 


 


RBC    3.68 L  (4.30-5.90)  m/uL


 


Hgb    10.8 L  (13.0-17.5)  gm/dL


 


Hct    33.1 L  (39.0-53.0)  %


 


BUN     (9-20)  mg/dL


 


Creatinine     (0.66-1.25)  mg/dL


 


Glucose     (74-99)  mg/dL


 


POC Glucose (mg/dL)  160 H  201 H   ()  mg/dL


 


Calcium     (8.4-10.2)  mg/dL


 


Troponin I     (0.000-0.034)  ng/mL














  08/09/23 08/09/23 Range/Units





  06:02 06:10 


 


RBC    (4.30-5.90)  m/uL


 


Hgb    (13.0-17.5)  gm/dL


 


Hct    (39.0-53.0)  %


 


BUN  42 H   (9-20)  mg/dL


 


Creatinine  2.03 H   (0.66-1.25)  mg/dL


 


Glucose  146 H   (74-99)  mg/dL


 


POC Glucose (mg/dL)   156 H  ()  mg/dL


 


Calcium  7.2 L   (8.4-10.2)  mg/dL


 


Troponin I    (0.000-0.034)  ng/mL

## 2023-08-10 LAB
ANION GAP SERPL CALC-SCNC: 13 MMOL/L
BUN SERPL-SCNC: 47 MG/DL (ref 9–20)
CALCIUM SPEC-MCNC: 6.8 MG/DL (ref 8.4–10.2)
CHLORIDE SERPL-SCNC: 99 MMOL/L (ref 98–107)
CO2 SERPL-SCNC: 24 MMOL/L (ref 22–30)
GLUCOSE BLD-MCNC: 154 MG/DL (ref 70–110)
GLUCOSE BLD-MCNC: 164 MG/DL (ref 70–110)
GLUCOSE BLD-MCNC: 181 MG/DL (ref 70–110)
GLUCOSE BLD-MCNC: 194 MG/DL (ref 70–110)
GLUCOSE SERPL-MCNC: 236 MG/DL (ref 74–99)
MAGNESIUM SPEC-SCNC: 2.2 MG/DL (ref 1.6–2.3)
POTASSIUM SERPL-SCNC: 3.9 MMOL/L (ref 3.5–5.1)
SODIUM SERPL-SCNC: 136 MMOL/L (ref 137–145)

## 2023-08-10 RX ADMIN — FUROSEMIDE SCH MG: 80 TABLET ORAL at 09:12

## 2023-08-10 RX ADMIN — LOSARTAN POTASSIUM SCH MG: 25 TABLET, FILM COATED ORAL at 09:12

## 2023-08-10 RX ADMIN — LEVOTHYROXINE SODIUM SCH MCG: 0.12 TABLET ORAL at 06:37

## 2023-08-10 RX ADMIN — ISOSORBIDE MONONITRATE SCH MG: 30 TABLET, EXTENDED RELEASE ORAL at 09:12

## 2023-08-10 RX ADMIN — HEPARIN SODIUM SCH UNIT: 5000 INJECTION, SOLUTION INTRAVENOUS; SUBCUTANEOUS at 20:00

## 2023-08-10 RX ADMIN — HEPARIN SODIUM SCH UNIT: 5000 INJECTION, SOLUTION INTRAVENOUS; SUBCUTANEOUS at 09:12

## 2023-08-10 RX ADMIN — ASPIRIN 81 MG CHEWABLE TABLET SCH MG: 81 TABLET CHEWABLE at 09:12

## 2023-08-10 RX ADMIN — INSULIN ASPART SCH UNIT: 100 INJECTION, SOLUTION INTRAVENOUS; SUBCUTANEOUS at 12:31

## 2023-08-10 RX ADMIN — INSULIN ASPART SCH UNIT: 100 INJECTION, SOLUTION INTRAVENOUS; SUBCUTANEOUS at 20:00

## 2023-08-10 RX ADMIN — INSULIN ASPART SCH UNIT: 100 INJECTION, SOLUTION INTRAVENOUS; SUBCUTANEOUS at 06:37

## 2023-08-10 RX ADMIN — INSULIN ASPART SCH UNIT: 100 INJECTION, SOLUTION INTRAVENOUS; SUBCUTANEOUS at 18:05

## 2023-08-10 RX ADMIN — ATORVASTATIN CALCIUM SCH MG: 20 TABLET, FILM COATED ORAL at 20:00

## 2023-08-10 NOTE — P.CNPUL
History of Present Illness


Consult date: 08/10/23


Requesting physician: Luiz Valentine


Reason for consult: abnormal CXR/CT


Chief complaint: Shortness of breath


History of present illness: 





I am seeing this patient in consultation today 08/10/2023 for acute CHF 

exacerbation.  Patient is a 83-year-old white male with past medical history 

significant for cardiomyopathy, congestive heart failure with a previous known 

ejection fraction of 40-45%, hypertension, hyperlipidemia, chronic kidney 

disease stage IIIB, hypothyroidism, and diabetes mellitus.  He presented to 

emergency room August 8th complaining of shortness of breath that started 3-4 

days prior.  The shortness of breath has progressively worsened, and is worse on

exertion. He does admit intermittent non-productive cough. He denies any chest 

pain, heart palpitations, lightheadedness, syncope, or lower extremity swelling.

 Denies infectious symptoms such as fever, chills, myalgias, productive cough. 

Chest CT demonstrated small to moderate bilateral pleural effusions and 

pulmonary vascular congestion consistent with congestive heart failure.  There 

was also a 5.8 x 3.5 cm pseuodomass within the fissure and nonspecific 

mediastinal adenopathy.  The mass is a fluid collection within the fissure, and 

not concerning for malignancy.  Repeat echocardiogram shows a reduced left 

ventricular ejection fraction of 40-45%, moderate pulmonary hypertension with 

and RVSP of 50, and mild to moderate tricuspid regurgitation. NT proBNP was 

significantly elevated at 21,400.  The patient has been started on diuretics.  

Patient is currently sitting up in bed, on room air, in no acute distress.  

Patient continues to have some shortness breath on exertion.  Reportedly had so

me unilateral lower extremity edema on arrival, venous Dopplers negative for 

left lower extremity DVT.  Most recent CBC shows some mild anemia with 

hemoglobin of 10.8, hematocrit 33.1, platelets 283, WBC 7.7.  BMP from yesterday

shows a sodium 138, potassium 3.5, chloride 100, serum bicarb 26, BUN 42, 

creatinine 2.03, glucose 146.  Troponins have been mildly elevated at 0.045 and 

are stable.  ECG on arrival shows sinus rhythm with frequent multifocal PVCs.  

Patient does seem hemodynamically stable at this time.














Review of Systems





REVIEW OF SYSTEMS:


CONSTITUTIONAL: Denies any recent significant weight loss or weight gain, night 

sweats, hemoptysis


EYES: Denies change in vision.


EARS, NOSE, MOUTH, THROAT: Denies headaches, denies sore throat.


CARDIOVASCULAR: Denies chest pain, palpitations or syncopal episodes.


RESPIRATORY: See HPI


GASTROINTESTINAL: Denies change in appetite, abdominal pain, nausea and 

vomiting, or diarrhea 


GENITOURINARY: Denies hematuria, denies infections.


MUSKULOSKELETAL: Denies pain, denies swelling.


INTEGUMENTARY: Denies rash, denies eczema.


NEUROLOGICAL: Denies recent memory loss, no recent seizure activity. 


PSYCHIATRIC: Denies anxiety, denies depression.


HEMATOLOGIC/LYMPHATIC: Denies anemia, denies enlarged lymph node





Past Medical History


Past Medical History: Heart Failure, Diabetes Mellitus, Hyperlipidemia, Thyroid 

Disorder


Additional Past Medical History / Comment(s): abnormal K+


History of Any Multi-Drug Resistant Organisms: None Reported


Additional Past Surgical History / Comment(s): Thyroidectomy


Past Anesthesia/Blood Transfusion Reactions: No Reported Reaction


Past Psychological History: No Psychological Hx Reported


Smoking Status: Former smoker


Past Alcohol Use History: None Reported


Past Drug Use History: None Reported





- Past Family History


  ** Mother


Family Medical History: Dementia


Additional Family Medical History / Comment(s): alzheimers





  ** Father


Family Medical History: Congestive Heart Failure (CHF)





Medications and Allergies


                                Home Medications











 Medication  Instructions  Recorded  Confirmed  Type


 


Levothyroxine Sodium [Synthroid] 125 mcg PO DAILY 11/16/21 08/08/23 History


 


Simvastatin [Zocor] 40 mg PO HS 11/16/21 08/08/23 History


 


metFORMIN HCL [Glucophage] 500 mg PO BID 09/21/22 08/08/23 History


 


amLODIPine [Norvasc] 5 mg PO DAILY 60 Days #60 tab 10/09/22 08/08/23 Rx


 


Empagliflozin [Jardiance] 25 mg PO DAILY 08/08/23 08/08/23 History


 


Furosemide [Lasix] 80 mg PO DAILY 08/08/23 08/08/23 History








                                    Allergies











Allergy/AdvReac Type Severity Reaction Status Date / Time


 


No Known Allergies Allergy   Verified 08/08/23 09:53














Physical Exam


Vitals: 


                                   Vital Signs











  Temp Pulse Resp BP Pulse Ox


 


 08/09/23 20:00  98 F  65  18  116/52  95


 


 08/09/23 16:00   62  18  130/52  95


 


 08/09/23 14:00   61  18  


 


 08/09/23 12:00   50 L  18  116/52  95


 


 08/09/23 08:00  97.8 F  61  18  112/49  93 L


 


 08/09/23 07:42      96


 


 08/09/23 04:00   84  18  145/66  96


 


 08/09/23 02:00   63   








                                Intake and Output











 08/09/23 08/09/23 08/10/23





 14:59 22:59 06:59


 


Intake Total 240 450 


 


Balance 240 450 


 


Intake:   


 


  Oral 240 450 














GENERAL EXAM: Alert, 83-year-old white male appearing stated age, comfortable in

 no apparent distress.


HEAD: Normocephalic and atraumatic


EYES: Normal reaction of pupils, equal size.


NOSE: Clear with pink turbinates.


THROAT: No erythema or exudates.


NECK: No masses, no JVD.


CHEST: No chest wall deformity.


LUNGS: Equal air entry with no crackles, wheeze, rhonchi or dullness.  On room 

air.  No conversational dyspnea or accessory muscle use.. 


CVS: S1 and S2 normal with no audible murmur, regular rhythm. No extra heart bean

nds


ABDOMEN: No hepatosplenomegaly, active bowel sounds, no guarding or rigidity. 


SPINE: No scoliosis or deformity


SKIN: No rashes


CENTRAL NERVOUS SYSTEM: No focal deficits, tone is normal in all 4 extremities.


EXTREMITIES: There is no peripheral edema, clubbing, or cyanosis.  Peripheral 

pulses are intact.





Results





- Laboratory Findings


CBC and BMP: 


                                 08/09/23 06:02





                                 08/09/23 06:02


PT/INR, D-dimer











PT  11.0 sec (9.0-12.0)   08/08/23  05:14    


 


INR  1.1  (<1.2)   08/08/23  05:14    








Abnormal lab findings: 


                                  Abnormal Labs











  08/08/23 08/08/23 08/08/23





  05:14 05:14 05:14


 


RBC  4.06 L  


 


Hgb  11.9 L  


 


Hct  36.6 L  


 


BUN   36 H 


 


Creatinine   1.67 H 


 


Glucose   164 H 


 


POC Glucose (mg/dL)   


 


Hemoglobin A1c   


 


Calcium   7.4 L 


 


Troponin I    0.045 H*














  08/08/23 08/08/23 08/08/23





  09:22 11:27 12:10


 


RBC   


 


Hgb   


 


Hct   


 


BUN   


 


Creatinine   


 


Glucose   


 


POC Glucose (mg/dL)   169 H 


 


Hemoglobin A1c   


 


Calcium   


 


Troponin I  0.050 H*   0.045 H*














  08/08/23 08/08/23 08/09/23





  16:45 19:42 06:02


 


RBC   


 


Hgb   


 


Hct   


 


BUN   


 


Creatinine   


 


Glucose   


 


POC Glucose (mg/dL)  160 H  201 H 


 


Hemoglobin A1c    8.8 H


 


Calcium   


 


Troponin I   














  08/09/23 08/09/23 08/09/23





  06:02 06:02 06:10


 


RBC  3.68 L  


 


Hgb  10.8 L  


 


Hct  33.1 L  


 


BUN   42 H 


 


Creatinine   2.03 H 


 


Glucose   146 H 


 


POC Glucose (mg/dL)    156 H


 


Hemoglobin A1c   


 


Calcium   7.2 L 


 


Troponin I   














  08/09/23 08/09/23 08/09/23





  11:47 16:54 20:15


 


RBC   


 


Hgb   


 


Hct   


 


BUN   


 


Creatinine   


 


Glucose   


 


POC Glucose (mg/dL)  159 H  128 H  216 H


 


Hemoglobin A1c   


 


Calcium   


 


Troponin I   














- Diagnostic Findings


Chest x-ray: image reviewed


CT scan - chest: image reviewed





Assessment and Plan


Assessment: 





Acute exacerbation of systolic congestive heart failure. Chest CT demonstrated 

small to moderate bilateral pleural effusions and pulmonary vascular congestion 

consistent with congestive heart failure.  There was also a 5.8 x 3.5 cm 

pseuodomass within the left lung fissure and nonspecific mediastinal adenopathy.

 Repeat echocardiogram shows a reduced left ventricular ejection fraction of 40-

45%, moderate pulmonary hypertension with and RVSP of 50, and mild to moderate 

tricuspid regurgitation. NT proBNP was significantly elevated at 21,400.  The 

patient has been started on diuretics.





Pseudomass measuring 5.8 x 3.5 cm, appears to be a fluid collection within the 

left major fissure, and not particularly concerning for malignancy.  Will likely

 respond with conservative management and diuretics. 





Cardiomyopathy, with a baseline ejection fraction of 40-45%





Elevated troponins, undetermined significance, stable





Anemia of chronic disease





Chronic kidney disease stage IIIB





Diabetes mellitus type 2, non-insulin-dependent





Essential hypertension





Hyperlipidemia





Obesity, with a BMI of 31 kg/m





Hypothyroidism





Remote ex-smoker, quit in 1967














Plan:


Patient's medications, labs, and imaging reviewed


On room air


Continue with diuresis


Cardiology has been following


May follow up outpatient for resolution of pseudotumor, doubt malignancy.











I have personally seen and examined the patient, performed the documentation and

 the assessment and plan as written.  Number of minutes spent on the visit:20





























Time with Patient: Greater than 30

## 2023-08-10 NOTE — P.PN
Subjective


Progress Note Date: 08/10/23





Hospital course:





Patient is a pleasant 83-year-old male with a past medical history of 

cardiomyopathy, chronic systolic heart failure with previously known EF of 40-

45%, hypertension, hyperlipidemia, chronic kidney disease stage IIIB 

hypothyroidism, and non-insulin-dependent diabetes mellitus.  He presented to 

the emergency department with a chief complaint of shortness of breath.  Patient

reports he has noticed increasing shortness of breath over the past few days, 

almost 1 week.  He reports this shortness of breath has been accompanied by 

cough But denies any other associated symptoms including headache, 

lightheadedness, dizziness, fever, chills, diaphoresis, chest pain or 

palpitations, abdominal pain, nausea, vomiting, or experiencing any noted swell

ing/weakness/tingling in his extremities.  Patient does report that his left 

lower extremity has been swollen for approximately 2 weeks but states he thinks 

he might have "twisted his ankle".  He underwent full evaluation in the 

emergency department.  Vital signs reviewed.  Blood pressure 127/66, heart rate 

63, respiratory rate 20, temp 97.8F, and SpO2 of 95% on room air.  EKG 

completed showing sinus bradycardia at 58 bpm with frequent PVCs and a left 

bundle branch block, left bundle branch block previously known and showing no 

changes when compared to EKG completed on 10/7/22 upon personal review and 

interpretation.  Chest x-ray completed showing left upper lobe masslike area 

measuring approximately 8.9 x 6.4 cm.  Labs completed and reviewed.  Initial 

troponin 0.045.  Normocytic anemia with hemoglobin 11.9.  Coagulation profile 

normal findings.  BMP consistent with stage III chronic kidney disease with BUN 

of 36, creatinine 1.67, and GFR of 37 which is at baseline creatinine levels.  

Liver profile showing no significant abnormalities.  ProBNP elevated at 21,400. 

Patient was admitted under our services with consultation to cardiology.  Left 

lower extremity Doppler was completed secondary to mild swelling and was 

negative for DVT.  Troponins trended and flat resulting at 0.045, 0.050, and 

0.045. Echocardiogram completed and report reviewed showing impaired EF of 40-

45% with inferior wall hypokinesia, moderate pulmonary hypertension and mild to 

moderate mitral and tricuspid regurgitation. Secondary to initial findings of 

left upper lobe masslike area measuring approximately 8.9 x 6.4 cm reported on 

chest x-ray, placed order for CT chest without contrast. CT chest without 

contrast showing a pseudomass noted within the left upper lobe showing fluid 

trapped within the fissure measuring approximately 5.8 x 3.5 cm along with 

scattered infiltrates and bilateral pleural effusions and pulmonary venous 

congestion consistent with congestive heart failure along with nonspecific 

mediastinal adenopathy.  Pulmonology consulted at this time.





Physical exam:





Patient seen and fully evaluated at bedside.  He reports continued shortness of 

breath has improved.  Patient reports he was even able to walk a short distance 

on the cohen.  Patient resumed oral Lasix this morning.  Renal function remains 

elevated we'll monitor her for an additional 24 hours and likely pain for 

discharge home tomorrow morning.





Vital signs reviewed and stable. 


General: Nontoxic, no distress and appears stated age.  


Derm: Skin warm and dry, normal coloration for ethnicity.


Head: Atraumatic, normocephalic and symmetric.  


Eyes: EOMs intact, no lid lag, and anicteric sclera


Mouth: no lip lesions, mucus membranes moist


Cardiovascular: regular rate and rhythm with normal S1S2, systolic murmur, 

positive posterior tibial pulses bilaterally, and cap refill < 2 seconds.  


Lungs: Respirations even, regular, and unlabored on room air. Lungs CTA with 

bibasilar crackles and soft expiratory wheezes.  Coarse nonproductive cough 

noted during assessment.  No accessory muscle usage.  Patient did have noted 

conversational dyspnea after 4-5 words.


Abdominal: soft, nontender to palpation, no guarding, no appreciable 

organomegaly


Ext: ROM intact. No gross muscle atrophy, left lower extremity 1+ pitting edema 

right lower extremity no edema.  no contractures


Neuro: Speech clear, face symmetrical and CN II-XII grossly intact with no noted

focal neuro deficits


Psych: Alert and oriented to person, place, time, and situation. Appropriate and

pleasant affect. 





Assessment and Plan of Care:





Patient is a pleasant 83-year-old male with a past medical history of chronic 

systolic heart failure with previously known EF 40-45% and presented to the 

emergency department on 8/8/23 with progressively worsening shortness of breath 

over the past few days.  He was admitted for acute on chronic systolic heart 

failure exacerbation and underwent IV diuresis with Lasix.  Patient had 

successful diuresis but had elevation in renal function and IV diuretic 

discontinued at this time and patient started on oral Lasix to begin tomorrow. 





Acute on chronic systolic heart failure with previously known EF of 40-45%


Pseudomass left upper lobe


Acute kidney injury on chronic kidney disease


Moderate pulmonary hypertension


Elevated troponin, chronic secondary to chronic kidney disease and chronic 

systolic heart failure


Cardiomyopathy


History of CAD


Hypertension


Hyperlipidemia


Chronic kidney disease stage IIIB


Hypothyroidism


Non-insulin-dependent diabetes mellitus type II





-Cardiology following, reviewed documentation in chart.


-Pulmonology following secondary to large pseudomass in left upper lobe


-Continue Telemetry monitoring


-Renal function remains elevated with BUN 47, creatinine 2.04, and GFR of 29.


-We will hold off on IV diuretics and Patient resumed oral Lasix 80 mg daily 

this morning.


-Continued Daily weights and Close monitoring of I's and O's


-Cardiac diet


-Continue glycemic protocol with NovoLog sliding scale


-We will replace repeat morning BMP and magnesium for continued close monitoring

of electrolytes and renal function while diuresing. 


-Patient to continue current cardiac medication regimen with aspirin 81 mg 

daily, atorvastatin 20 mg daily, carvedilol 6.25 mg twice daily, Lasix 80 mg 

daily, hydralazine 25 mg 3 times daily, isosorbide mononitrate 30 mg daily, and 

losartan 25 mg daily.





Data review:


-Morning labs reviewed.  BMP showing continued elevation of renal function with 

BUN 47, creatinine 2.04, and GFR of 29.  Electrolytes stable with sodium 136, 

potassium 3.9, and magnesium of 2.2.


-Vital signs stable blood pressure 126/60, heart rate 53, respiratory rate 20, 

temp 97.4F, and SpO2 of 93% on room air.





Imaging reviewed:


-No new testing for review.











CODE STATUS: Full code


DVT prophylaxis: Heparin


Discussed with: Patient, RN, and pulmonary NP 


Anticipated discharge date: Likely within the next 24 hours pending improvement 

of renal function


Anticipated discharge place: Home


Patient was seen independently by Nurse Practitioner.


This document was prepared using Dragon dictation software.  Please allow for 

errors in transcription while rare they do occur.





Luiz Valentine, NP rendered care for this patient independently, reviewed the 

findings and plan as documented in the note above.  I did not physically speak 

with or examine the patient on this date. 





Objective





- Vital Signs


Vital signs: 


                                   Vital Signs











Temp  98 F   08/09/23 20:00


 


Pulse  50 L  08/10/23 04:00


 


Resp  18   08/10/23 04:00


 


BP  130/61   08/10/23 04:00


 


Pulse Ox  95   08/10/23 04:00


 


FiO2      








                                 Intake & Output











 08/09/23 08/10/23 08/10/23





 18:59 06:59 18:59


 


Intake Total 690  240


 


Balance 690  240


 


Weight  99.2 kg 


 


Intake:   


 


  Oral 690  240














- Labs


CBC & Chem 7: 


                                 08/11/23 05:57





                                 08/11/23 05:57


Labs: 


                  Abnormal Lab Results - Last 24 Hours (Table)











  08/09/23 08/09/23 08/09/23 Range/Units





  06:02 11:47 16:54 


 


POC Glucose (mg/dL)   159 H  128 H  ()  mg/dL


 


Hemoglobin A1c  8.8 H    (<=6.0)  %














  08/09/23 08/10/23 Range/Units





  20:15 05:49 


 


POC Glucose (mg/dL)  216 H  154 H  ()  mg/dL


 


Hemoglobin A1c    (<=6.0)  %

## 2023-08-10 NOTE — P.PN
Subjective


Progress Note Date: 08/10/23





HISTORY OF PRESENT ILLNESS:  





This is a 83-year-old male with a past medical history significant for 

congestive heart failure, diabetes, hypertension, hyperlipidemia, and 

cardiomyopathy. Patient does not follow with a cardiologist. We have been asked 

to see the patient in consultation for congestive heart failure. Patient 

examined at the bedside.  Patient presented to the hospital with a chief 

complaint of shortness of breath.  Patient was found to be in congestive heart 

failure.  He was started on IV Lasix.  He denies any chest pain or pressure.  He

denies any dizziness or lightheadedness.  Patient states he has been compliant 

with his medications. Patient underwent Lexiscan stress testing in November 2021

revealing possibility of small stress-induced reversible ischemia involving the 

apical lateral myocardium.  Patient was posterior follow up on an outpatient 

basis regarding this however he has not followed up in the office.  When the 

patient was asked about this, he states that he is not interested in seeing a 

cardiologist and does not want to pursue any further cardiac testing.





* EKG reveals sinus bradycardia with PVCs.  Left axis deviation.  Left bundle 

  branch block.


* Chest xray there is left upper lobe masslike area measuring 8.9 x 6.4 cm.  

  Stable interstitial change throughout the right lung likely reflecting sequela

  of prior infection


* Laboratory data: WBC 7.3.  Hemoglobin 11.9.  Platelet count 339.  Sodium 139. 

  Potassium 4.3.  BUN 36. Creatinine 1.67.  Troponin 0.045.  ProBNP 21,400.


* Current home cardiac medications include amlodipine 5 mrem daily, Lasix 80 mg 

  daily, simvastatin 40 mg at night


* Most recent echocardiogram obtained in October 2022 revealing ejection 

  fraction 40-45%, moderate pulmonary hypertension, mild mitral regurgitation, 

  mild aortic regurgitation, mild tricuspid regurgitation


* Cardiac catheterization history: Patient denies





8/9/2023


Patient examined this morning.  He is sitting up in a chair.  Patient denies 

chest pain or pressure.  He denies shortness of breath.  He remains on IV Lasix.

 Creatinine today 2.03.  Vital signs are stable.  Echocardiogram completed 

revealing ejection fraction 40-45%, inferior wall hypokinesia, mild-to-moderate 

mitral and tricuspid regurgitation.





8/10/2023


Patient examined this morning.  Patient is sitting up in the chair.  Patient 

denies chest pain or pressure.  He denies shortness of breath.  He remains on 

oral diuretics.  Vital signs are stable.





PHYSICAL EXAM: 


VITAL SIGNS: Reviewed.


GENERAL: Well-developed in no acute distress. 


HEENT: Head is normocephalic. Pupils are equal, round. Sclerae anicteric. Mucous

membranes of the mouth are moist. Neck supple. No JVD or thyromegaly


LUNGS: Respirations even and unlabored. Lungs diminished to auscultation 

bilaterally.


HEART: Regular rate and rhythm.  S1 and S2 heard.


ABDOMEN: Soft. Nondistended. Nontender.


EXTREMITIES: Normal range of motion.  No clubbing or cyanosis.  Peripheral pu

lses intact.  Minimal lower extremity edema


NEUROLOGIC: Awake and alert. Oriented x 3. 





ASSESSMENT: 


Shortness of breath


Acute on chronic heart failure with reduced ejection fraction


Cardiomyopathy, ejection fraction 40-45%, unknown if ischemic or nonischemic


Chronic kidney disease


Hypertension


Hyperlipidemia


Diabetes





PLAN: 


Continue current cardiac medications


Patient is now agreeable to follow-up with Dr. Mcelroy post discharge. Patient 

will need a follow up appointment in two weeks.


Patient is stable for discharge home today from a cardiac standpoint








Nurse practitioner note has been reviewed by physician. Signing provider agrees 

with the documented findings, assessment, and plan of care. 








Objective





- Vital Signs


Vital signs: 


                                   Vital Signs











Temp  97.4 F L  08/10/23 08:00


 


Pulse  53 L  08/10/23 08:00


 


Resp  20   08/10/23 08:00


 


BP  126/60   08/10/23 08:00


 


Pulse Ox  93 L  08/10/23 08:00


 


FiO2      








                                 Intake & Output











 08/09/23 08/10/23 08/10/23





 18:59 06:59 18:59


 


Intake Total 690  240


 


Balance 690  240


 


Weight  99.2 kg 


 


Intake:   


 


  Oral 690  240














- Labs


CBC & Chem 7: 


                                 08/09/23 06:02





                                 08/10/23 08:09


Labs: 


                  Abnormal Lab Results - Last 24 Hours (Table)











  08/09/23 08/09/23 08/10/23 Range/Units





  16:54 20:15 05:49 


 


Sodium     (137-145)  mmol/L


 


BUN     (9-20)  mg/dL


 


Creatinine     (0.66-1.25)  mg/dL


 


Glucose     (74-99)  mg/dL


 


POC Glucose (mg/dL)  128 H  216 H  154 H  ()  mg/dL


 


Calcium     (8.4-10.2)  mg/dL














  08/10/23 08/10/23 Range/Units





  08:09 11:53 


 


Sodium  136 L   (137-145)  mmol/L


 


BUN  47 H   (9-20)  mg/dL


 


Creatinine  2.04 H   (0.66-1.25)  mg/dL


 


Glucose  236 H   (74-99)  mg/dL


 


POC Glucose (mg/dL)   181 H  ()  mg/dL


 


Calcium  6.8 L   (8.4-10.2)  mg/dL

## 2023-08-11 LAB
ANION GAP SERPL CALC-SCNC: 13 MMOL/L
BUN SERPL-SCNC: 49 MG/DL (ref 9–20)
CALCIUM SPEC-MCNC: 7 MG/DL (ref 8.4–10.2)
CHLORIDE SERPL-SCNC: 102 MMOL/L (ref 98–107)
CO2 SERPL-SCNC: 23 MMOL/L (ref 22–30)
ERYTHROCYTE [DISTWIDTH] IN BLOOD BY AUTOMATED COUNT: 3.66 M/UL (ref 4.3–5.9)
ERYTHROCYTE [DISTWIDTH] IN BLOOD: 14.7 % (ref 11.5–15.5)
GLUCOSE BLD-MCNC: 161 MG/DL (ref 70–110)
GLUCOSE BLD-MCNC: 163 MG/DL (ref 70–110)
GLUCOSE BLD-MCNC: 182 MG/DL (ref 70–110)
GLUCOSE BLD-MCNC: 186 MG/DL (ref 70–110)
GLUCOSE SERPL-MCNC: 159 MG/DL (ref 74–99)
GLUCOSE UR QL: (no result)
HCT VFR BLD AUTO: 32.9 % (ref 39–53)
HGB BLD-MCNC: 10.6 GM/DL (ref 13–17.5)
MAGNESIUM SPEC-SCNC: 2.3 MG/DL (ref 1.6–2.3)
MCH RBC QN AUTO: 28.9 PG (ref 25–35)
MCHC RBC AUTO-ENTMCNC: 32.1 G/DL (ref 31–37)
MCV RBC AUTO: 90 FL (ref 80–100)
PH UR: 5 [PH] (ref 5–8)
PLATELET # BLD AUTO: 291 K/UL (ref 150–450)
POTASSIUM SERPL-SCNC: 3.8 MMOL/L (ref 3.5–5.1)
SODIUM SERPL-SCNC: 138 MMOL/L (ref 137–145)
SP GR UR: 1.01 (ref 1–1.03)
UROBILINOGEN UR QL STRIP: <2 MG/DL (ref ?–2)
WBC # BLD AUTO: 8 K/UL (ref 3.8–10.6)

## 2023-08-11 RX ADMIN — FUROSEMIDE SCH MG: 80 TABLET ORAL at 08:14

## 2023-08-11 RX ADMIN — INSULIN ASPART SCH UNIT: 100 INJECTION, SOLUTION INTRAVENOUS; SUBCUTANEOUS at 17:30

## 2023-08-11 RX ADMIN — FUROSEMIDE SCH MG: 10 INJECTION, SOLUTION INTRAMUSCULAR; INTRAVENOUS at 20:55

## 2023-08-11 RX ADMIN — INSULIN ASPART SCH: 100 INJECTION, SOLUTION INTRAVENOUS; SUBCUTANEOUS at 13:15

## 2023-08-11 RX ADMIN — ISOSORBIDE MONONITRATE SCH MG: 30 TABLET, EXTENDED RELEASE ORAL at 08:14

## 2023-08-11 RX ADMIN — LOSARTAN POTASSIUM SCH MG: 25 TABLET, FILM COATED ORAL at 08:14

## 2023-08-11 RX ADMIN — INSULIN ASPART SCH UNIT: 100 INJECTION, SOLUTION INTRAVENOUS; SUBCUTANEOUS at 05:58

## 2023-08-11 RX ADMIN — HEPARIN SODIUM SCH UNIT: 5000 INJECTION, SOLUTION INTRAVENOUS; SUBCUTANEOUS at 08:14

## 2023-08-11 RX ADMIN — FUROSEMIDE SCH MG: 10 INJECTION, SOLUTION INTRAMUSCULAR; INTRAVENOUS at 17:31

## 2023-08-11 RX ADMIN — ATORVASTATIN CALCIUM SCH MG: 20 TABLET, FILM COATED ORAL at 20:55

## 2023-08-11 RX ADMIN — LEVOTHYROXINE SODIUM SCH MCG: 0.12 TABLET ORAL at 05:57

## 2023-08-11 RX ADMIN — INSULIN ASPART SCH UNIT: 100 INJECTION, SOLUTION INTRAVENOUS; SUBCUTANEOUS at 20:56

## 2023-08-11 RX ADMIN — HEPARIN SODIUM SCH: 5000 INJECTION, SOLUTION INTRAVENOUS; SUBCUTANEOUS at 20:55

## 2023-08-11 RX ADMIN — ASPIRIN 81 MG CHEWABLE TABLET SCH MG: 81 TABLET CHEWABLE at 08:14

## 2023-08-11 NOTE — P.PN
Subjective





HISTORY OF PRESENT ILLNESS:  





This is a 83-year-old male with a past medical history significant for 

congestive heart failure, diabetes, hypertension, hyperlipidemia, and 

cardiomyopathy. Patient does not follow with a cardiologist. We have been asked 

to see the patient in consultation for congestive heart failure. Patient ex

amined at the bedside.  Patient presented to the hospital with a chief complaint

of shortness of breath.  Patient was found to be in congestive heart failure.  

He was started on IV Lasix.  He denies any chest pain or pressure.  He denies 

any dizziness or lightheadedness.  Patient states he has been compliant with his

medications. Patient underwent Lexiscan stress testing in November 2021 

revealing possibility of small stress-induced reversible ischemia involving the 

apical lateral myocardium.  Patient was posterior follow up on an outpatient 

basis regarding this however he has not followed up in the office.  When the 

patient was asked about this, he states that he is not interested in seeing a 

cardiologist and does not want to pursue any further cardiac testing.





* EKG reveals sinus bradycardia with PVCs.  Left axis deviation.  Left bundle 

  branch block.


* Chest xray there is left upper lobe masslike area measuring 8.9 x 6.4 cm.  

  Stable interstitial change throughout the right lung likely reflecting sequela

  of prior infection


* Laboratory data: WBC 7.3.  Hemoglobin 11.9.  Platelet count 339.  Sodium 139. 

  Potassium 4.3.  BUN 36. Creatinine 1.67.  Troponin 0.045.  ProBNP 21,400.


* Current home cardiac medications include amlodipine 5 mrem daily, Lasix 80 mg 

  daily, simvastatin 40 mg at night


* Most recent echocardiogram obtained in October 2022 revealing ejection 

  fraction 40-45%, moderate pulmonary hypertension, mild mitral regurgitation, 

  mild aortic regurgitation, mild tricuspid regurgitation


* Cardiac catheterization history: Patient denies





8/9/2023


Patient examined this morning.  He is sitting up in a chair.  Patient denies 

chest pain or pressure.  He denies shortness of breath.  He remains on IV Lasix.

 Creatinine today 2.03.  Vital signs are stable.  Echocardiogram completed 

revealing ejection fraction 40-45%, inferior wall hypokinesia, mild-to-moderate 

mitral and tricuspid regurgitation.





8/10/2023


Patient examined this morning.  Patient is sitting up in the chair.  Patient 

denies chest pain or pressure.  He denies shortness of breath.  He remains on 

oral diuretics.  Vital signs are stable.





8/11/2023


patient examined this morning.  Patient is sitting up in the chair.  He denies 

chest pain or pressure.  He denies shortness of breath.  He remains on oral 

diuretics.  Creatinine today is 2.16.  Vital signs are stable.





PHYSICAL EXAM: 


VITAL SIGNS: Reviewed.


GENERAL: Well-developed in no acute distress. 


HEENT: Head is normocephalic. Pupils are equal, round. Sclerae anicteric. Mucous

membranes of the mouth are moist. Neck supple. No JVD or thyromegaly


LUNGS: Respirations even and unlabored. Lungs diminished to auscultation 

bilaterally.


HEART: Regular rate and rhythm.  S1 and S2 heard.


ABDOMEN: Soft. Nondistended. Nontender.


EXTREMITIES: Normal range of motion.  No clubbing or cyanosis.  Peripheral 

pulses intact.  No lower extremity edema


NEUROLOGIC: Awake and alert. Oriented x 3. 





ASSESSMENT: 


Shortness of breath


Acute on chronic heart failure with reduced ejection fraction


Cardiomyopathy, ejection fraction 40-45%, unknown if ischemic or nonischemic


Acute on chronic kidney disease


Hypertension


Hyperlipidemia


Diabetes





PLAN: 


Continue current cardiac medications


Continue current dose of Lasix


Patient is now agreeable to follow-up with Dr. Mcelroy post discharge. Patient 

will need a follow up appointment in two weeks.


Patient is stable for discharge home today from a cardiac standpoint


We will sign off.  Please reconsult if needed.








Nurse practitioner note has been reviewed by physician. Signing provider agrees 

with the documented findings, assessment, and plan of care. 








Objective





- Vital Signs


Vital signs: 


                                   Vital Signs











Temp  98.0 F   08/11/23 08:00


 


Pulse  50 L  08/11/23 08:00


 


Resp  18   08/11/23 08:00


 


BP  122/55   08/11/23 08:00


 


Pulse Ox  93 L  08/11/23 08:00


 


FiO2      








                                 Intake & Output











 08/10/23 08/11/23 08/11/23





 18:59 06:59 18:59


 


Intake Total 358  240


 


Output Total  800 


 


Balance 358 -800 240


 


Weight  99.1 kg 


 


Intake:   


 


  Oral 358  240


 


Output:   


 


  Urine  800 


 


Other:   


 


  Voiding Method  Urinal 














- Labs


CBC & Chem 7: 


                                 08/12/23 08:41





                                 08/12/23 08:41


Labs: 


                  Abnormal Lab Results - Last 24 Hours (Table)











  08/10/23 08/10/23 08/10/23 Range/Units





  11:53 16:17 19:53 


 


RBC     (4.30-5.90)  m/uL


 


Hgb     (13.0-17.5)  gm/dL


 


Hct     (39.0-53.0)  %


 


BUN     (9-20)  mg/dL


 


Creatinine     (0.66-1.25)  mg/dL


 


Glucose     (74-99)  mg/dL


 


POC Glucose (mg/dL)  181 H  164 H  194 H  ()  mg/dL


 


Calcium     (8.4-10.2)  mg/dL














  08/11/23 08/11/23 08/11/23 Range/Units





  05:50 05:57 05:57 


 


RBC   3.66 L   (4.30-5.90)  m/uL


 


Hgb   10.6 L   (13.0-17.5)  gm/dL


 


Hct   32.9 L   (39.0-53.0)  %


 


BUN    49 H  (9-20)  mg/dL


 


Creatinine    2.16 H  (0.66-1.25)  mg/dL


 


Glucose    159 H  (74-99)  mg/dL


 


POC Glucose (mg/dL)  161 H    ()  mg/dL


 


Calcium    7.0 L  (8.4-10.2)  mg/dL

## 2023-08-11 NOTE — P.NPCON
History of Present Illness





- Reason for Consult


acute renal failure, chronic renal failure





- History of Present Illness





Reason for consultation: Acute kidney injury on chronic kidney disease





History of present illness:


Patient is a 83-year-old male seen in renal consultation for acute kidney injury

on chronic kidney disease.  Patient has chronic kidney disease stage IIIB with 

baseline creatinine in the range of 1.6-1.9.  Creatinine was 1.67 on admission 

and is at 2.6 today.  Patient came to the hospital due to worsening shortness of

breath.  Patient states he developed a cough about a month ago but noticed 

dyspnea even with minimal exertion 1 week prior to admission.  Patient has been 

receiving IV Lasix and is now changed to oral Lasix 80 mg once daily.  Patient 

is noted to have an ejection fraction of 40-45% with moderate to severe 

pulmonary hypertension, mild to moderate mitral and tricuspid regurgitation.  

Patient does admit to history of diabetes.  He denies any history of cardiac 

stents.  Denies family history of renal disease.  Denies regular use of 

nonsteroidals.  No vomiting or diarrhea.  No chest pain.  No gross hematuria or 

dysuria.





Vital signs are stable.


General: No acute distress.


HEENT: Head exam is unremarkable.


LUNGS: No audible rhonchi or wheezes.


HEART: Rate and Rhythm are regular.


ABDOMEN: Soft, nontender.


EXTREMITITES: No edema.





Past Medical History


Past Medical History: Heart Failure, Diabetes Mellitus, Hyperlipidemia, Thyroid 

Disorder


Additional Past Medical History / Comment(s): abnormal K+


History of Any Multi-Drug Resistant Organisms: None Reported


Additional Past Surgical History / Comment(s): Thyroidectomy


Past Anesthesia/Blood Transfusion Reactions: No Reported Reaction


Past Psychological History: No Psychological Hx Reported


Smoking Status: Former smoker


Past Alcohol Use History: None Reported


Past Drug Use History: None Reported





- Past Family History


  ** Mother


Family Medical History: Dementia


Additional Family Medical History / Comment(s): alzheimers





  ** Father


Family Medical History: Congestive Heart Failure (CHF)





Medications and Allergies


                                Home Medications











 Medication  Instructions  Recorded  Confirmed  Type


 


Levothyroxine Sodium [Synthroid] 125 mcg PO DAILY 11/16/21 08/08/23 History


 


Simvastatin [Zocor] 40 mg PO HS 11/16/21 08/08/23 History


 


metFORMIN HCL [Glucophage] 500 mg PO BID 09/21/22 08/08/23 History


 


amLODIPine [Norvasc] 5 mg PO DAILY 60 Days #60 tab 10/09/22 08/08/23 Rx


 


Empagliflozin [Jardiance] 25 mg PO DAILY 08/08/23 08/08/23 History


 


Furosemide [Lasix] 80 mg PO DAILY 08/08/23 08/08/23 History








                                    Allergies











Allergy/AdvReac Type Severity Reaction Status Date / Time


 


No Known Allergies Allergy   Verified 08/08/23 09:53














Physical Exam


Vitals: 


                                   Vital Signs











  Temp Pulse Resp BP Pulse Ox


 


 08/11/23 08:00  98.0 F  50 L  18  122/55  93 L


 


 08/11/23 03:38  98.3 F  73  20  153/77  92 L


 


 08/10/23 23:30  98.3 F  73  20  125/61  95


 


 08/10/23 19:42  98.5 F  69  18  138/77  94 L


 


 08/10/23 14:00   50 L  18  


 


 08/10/23 12:00   50 L  18  121/49  94 L








                                Intake and Output











 08/10/23 08/11/23 08/11/23





 22:59 06:59 14:59


 


Intake Total 118  240


 


Output Total 800  


 


Balance -682  240


 


Intake:   


 


  Oral 118  240


 


Output:   


 


  Urine 800  


 


Other:   


 


  Voiding Method Urinal Urinal 


 


  Weight  99.1 kg 














Results





- Lab Results


                             Most recent lab results











Calcium  7.0 mg/dL (8.4-10.2)  L  08/11/23  05:57    


 


Magnesium  2.3 mg/dL (1.6-2.3)   08/11/23  05:57    














                                 08/11/23 05:57





                                 08/11/23 05:57





Assessment and Plan


Plan: 





Assessment:


1.  Acute kidney injury secondary to ATN secondary to cardiorenal syndrome.  

Creatinine 1.67 on admission and is 2.16 today.  Renal ultrasound from September 2022 showed normal-size kidneys without any evidence of hydronephrosis.


2.  Chronic kidney disease stage IIIB with baseline creatinine 1.6-1.9 secondary

 to cardiorenal syndrome.


3.  Volume overload.  Improving diuresis.


4.  Diabetes mellitus.


5.  Acute on chronic systolic CHF with ejection fraction of 40-45 percent with 

mild to moderate mitral and tricuspid regurgitation and moderate to severe 

pulmonary hypertension.


6.  Hypertension with chronic kidney disease.





Plan:


Maintain oral Lasix.


Check UA.


Avoid nephrotoxins.


Continue to monitor renal function and urine output.


Repeat chest x-ray.


Cardiac diet.


1500 mL fluid restriction.





Thank you for the consultation.  I will continue to follow patient with you 

during his hospital stay.

## 2023-08-11 NOTE — P.PN
Subjective


Progress Note Date: 08/11/23





I am seeing this patient in consultation today 08/10/2023 for acute CHF 

exacerbation.  Patient is a 83-year-old white male with past medical history 

significant for cardiomyopathy, congestive heart failure with a previous known 

ejection fraction of 40-45%, hypertension, hyperlipidemia, chronic kidney dise

ase stage IIIB, hypothyroidism, and diabetes mellitus.  He presented to 

emergency room August 8th complaining of shortness of breath that started 3-4 

days prior.  The shortness of breath has progressively worsened, and is worse on

exertion. He does admit intermittent non-productive cough. He denies any chest 

pain, heart palpitations, lightheadedness, syncope, or lower extremity swelling.

 Denies infectious symptoms such as fever, chills, myalgias, productive cough. 

Chest CT demonstrated small to moderate bilateral pleural effusions and 

pulmonary vascular congestion consistent with congestive heart failure.  There 

was also a 5.8 x 3.5 cm pseuodomass within the fissure and nonspecific mediasti

nal adenopathy.  The mass is a fluid collection within the fissure, and not 

concerning for malignancy.  Repeat echocardiogram shows a reduced left 

ventricular ejection fraction of 40-45%, moderate pulmonary hypertension with 

and RVSP of 50, and mild to moderate tricuspid regurgitation. NT proBNP was 

significantly elevated at 21,400.  The patient has been started on diuretics.  

Patient is currently sitting up in bed, on room air, in no acute distress.  

Patient continues to have some shortness breath on exertion.  Reportedly had 

some unilateral lower extremity edema on arrival, venous Dopplers negative for 

left lower extremity DVT.  Most recent CBC shows some mild anemia with 

hemoglobin of 10.8, hematocrit 33.1, platelets 283, WBC 7.7.  BMP from yesterday

shows a sodium 138, potassium 3.5, chloride 100, serum bicarb 26, BUN 42, 

creatinine 2.03, glucose 146.  Troponins have been mildly elevated at 0.045 and 

are stable.  ECG on arrival shows sinus rhythm with frequent multifocal PVCs.  

Patient does seem hemodynamically stable at this time.





The patient is seen today 08/11/2023 in follow-up on the selective care unit.  

He is currently sitting up in a chair at the bedside.  Awake and alert in no 

acute distress.  He is maintaining good O2 saturations in the 90s on room air.  

He is making good urine.  Chest x-ray reveals evidence of interstitial pulmonary

edema with small effusions.  Some pleural fluid trapped in the left major 

fissure.  White count 8.0.  Hemoglobin 10.6.  Platelets 291.  Sodium 138.  

Potassium 3.8.  Bicarb 23.  BUN 49.  Creatinine 2.16.  He is continued on Lasix 

80 mg daily.





Objective





- Vital Signs


Vital signs: 


                                   Vital Signs











Temp  98.0 F   08/11/23 08:00


 


Pulse  50 L  08/11/23 08:00


 


Resp  18   08/11/23 08:00


 


BP  122/55   08/11/23 08:00


 


Pulse Ox  93 L  08/11/23 08:00


 


FiO2      








                                 Intake & Output











 08/10/23 08/11/23 08/11/23





 18:59 06:59 18:59


 


Intake Total 358  240


 


Output Total  800 


 


Balance 358 -800 240


 


Weight  99.1 kg 


 


Intake:   


 


  Oral 358  240


 


Output:   


 


  Urine  800 


 


Other:   


 


  Voiding Method  Urinal 














- Exam





GENERAL EXAM: Alert, very pleasant 83-year-old male, stable and on room air, 

comfortable in no apparent distress.


HEAD: Normocephalic and atraumatic


EYES: Normal reaction of pupils, equal size.


NOSE: Clear with pink turbinates.


THROAT: No erythema or exudates.


NECK: No masses, no JVD.


CHEST: No chest wall deformity.


LUNGS: Equal air entry with few crackles in the posterior bases.  No 

conversational dyspnea or accessory muscle use.. 


CVS: S1 and S2 normal with no audible murmur, regular rhythm. No extra heart 

sounds


ABDOMEN: No hepatosplenomegaly, active bowel sounds, no guarding or rigidity. 


SPINE: No scoliosis or deformity


SKIN: No rashes


CENTRAL NERVOUS SYSTEM: No focal deficits, tone is normal in all 4 extremities.


EXTREMITIES: There is no peripheral edema, clubbing, or cyanosis.  Peripheral 

pulses are intact.





- Labs


CBC & Chem 7: 


                                 08/11/23 05:57





                                 08/11/23 05:57


Labs: 


                  Abnormal Lab Results - Last 24 Hours (Table)











  08/10/23 08/10/23 08/10/23 Range/Units





  11:53 16:17 19:53 


 


RBC     (4.30-5.90)  m/uL


 


Hgb     (13.0-17.5)  gm/dL


 


Hct     (39.0-53.0)  %


 


BUN     (9-20)  mg/dL


 


Creatinine     (0.66-1.25)  mg/dL


 


Glucose     (74-99)  mg/dL


 


POC Glucose (mg/dL)  181 H  164 H  194 H  ()  mg/dL


 


Calcium     (8.4-10.2)  mg/dL














  08/11/23 08/11/23 08/11/23 Range/Units





  05:50 05:57 05:57 


 


RBC   3.66 L   (4.30-5.90)  m/uL


 


Hgb   10.6 L   (13.0-17.5)  gm/dL


 


Hct   32.9 L   (39.0-53.0)  %


 


BUN    49 H  (9-20)  mg/dL


 


Creatinine    2.16 H  (0.66-1.25)  mg/dL


 


Glucose    159 H  (74-99)  mg/dL


 


POC Glucose (mg/dL)  161 H    ()  mg/dL


 


Calcium    7.0 L  (8.4-10.2)  mg/dL














Assessment and Plan


Assessment: 





Acute exacerbation of systolic congestive heart failure. Chest CT demonstrated 

small to moderate bilateral pleural effusions and pulmonary vascular congestion 

consistent with congestive heart failure.  There was also a 5.8 x 3.5 cm 

pseuodomass within the left lung fissure and nonspecific mediastinal adenopathy.

Repeat echocardiogram shows a reduced left ventricular ejection fraction of 40-

45%, moderate pulmonary hypertension with and RVSP of 50, and mild to moderate 

tricuspid regurgitation. NT proBNP was significantly elevated at 21,400.  The 

patient has been started on diuretics.





Pseudomass measuring 5.8 x 3.5 cm, appears to be a fluid collection within the 

left major fissure, and not particularly concerning for malignancy.  Will likely

respond with conservative management and diuretics. 





Cardiomyopathy, with a baseline ejection fraction of 40-45%





Elevated troponins, undetermined significance, stable





Anemia of chronic disease





Chronic kidney disease stage IIIB





Diabetes mellitus type 2, non-insulin-dependent





Essential hypertension





Hyperlipidemia





Obesity, with a BMI of 31 kg/m





Hypothyroidism





Remote ex-smoker, quit in 1967





Plan:





The patient was seen and evaluated


Chest x-ray, labs and medications reviewed


Currently stable and on room air


Cleared for discharge from the pulmonary standpoint


Continue diuretics


Follow-up in our office in 1 week





I have personally seen and examined the patient, performed the documentation and

the assessment and plan as written.  Number of minutes spent on the visit: 10.

## 2023-08-11 NOTE — P.PN
Subjective


Progress Note Date: 08/11/23





Hospital course:





Patient is a pleasant 83-year-old male with a past medical history of 

cardiomyopathy, chronic systolic heart failure with previously known EF of 40-

45%, hypertension, hyperlipidemia, chronic kidney disease stage IIIB 

hypothyroidism, and non-insulin-dependent diabetes mellitus.  He presented to 

the emergency department with a chief complaint of shortness of breath.  Patient

reports he has noticed increasing shortness of breath over the past few days, 

almost 1 week.  He reports this shortness of breath has been accompanied by 

cough But denies any other associated symptoms including headache, 

lightheadedness, dizziness, fever, chills, diaphoresis, chest pain or 

palpitations, abdominal pain, nausea, vomiting, or experiencing any noted swell

ing/weakness/tingling in his extremities.  Patient does report that his left 

lower extremity has been swollen for approximately 2 weeks but states he thinks 

he might have "twisted his ankle".  He underwent full evaluation in the 

emergency department.  Vital signs reviewed.  Blood pressure 127/66, heart rate 

63, respiratory rate 20, temp 97.8F, and SpO2 of 95% on room air.  EKG 

completed showing sinus bradycardia at 58 bpm with frequent PVCs and a left 

bundle branch block, left bundle branch block previously known and showing no 

changes when compared to EKG completed on 10/7/22 upon personal review and 

interpretation.  Chest x-ray completed showing left upper lobe masslike area 

measuring approximately 8.9 x 6.4 cm.  Labs completed and reviewed.  Initial 

troponin 0.045.  Normocytic anemia with hemoglobin 11.9.  Coagulation profile 

normal findings.  BMP consistent with stage III chronic kidney disease with BUN 

of 36, creatinine 1.67, and GFR of 37 which is at baseline creatinine levels.  

Liver profile showing no significant abnormalities.  ProBNP elevated at 21,400. 

Patient was admitted under our services with consultation to cardiology.  Left 

lower extremity Doppler was completed secondary to mild swelling and was 

negative for DVT.  Troponins trended and flat resulting at 0.045, 0.050, and 

0.045. Echocardiogram completed and report reviewed showing impaired EF of 40-

45% with inferior wall hypokinesia, moderate pulmonary hypertension and mild to 

moderate mitral and tricuspid regurgitation. Secondary to initial findings of 

left upper lobe masslike area measuring approximately 8.9 x 6.4 cm reported on 

chest x-ray, placed order for CT chest without contrast. CT chest without 

contrast showing a pseudomass noted within the left upper lobe showing fluid 

trapped within the fissure measuring approximately 5.8 x 3.5 cm along with 

scattered infiltrates and bilateral pleural effusions and pulmonary venous 

congestion consistent with congestive heart failure along with nonspecific 

mediastinal adenopathy.  Pulmonology consulted at this time.





Physical exam:





Vital signs reviewed and stable. 


General: Nontoxic, no distress and appears stated age.  


Derm: Skin warm and dry, normal coloration for ethnicity.


Head: Atraumatic, normocephalic and symmetric.  


Eyes: EOMs intact, no lid lag, and anicteric sclera


Mouth: no lip lesions, mucus membranes moist


Cardiovascular: regular rate and rhythm with normal S1S2, systolic murmur, 

positive posterior tibial pulses bilaterally, and cap refill < 2 seconds.  


Lungs: Respirations even, regular, and unlabored on room air. Lungs with good 

air entry and exit and continued bibasilar crackles and soft expiratory wheezes 

with coarse nonproductive cough.  No accessory muscle usage   And no conv

ersational dyspnea noted during examination today.


Abdominal: soft, nontender to palpation, no guarding, no appreciable 

organomegaly


Ext: ROM intact. No gross muscle atrophy, left lower extremity 1+ pitting edema 

right lower extremity no edema.  no contractures


Neuro: Speech clear, face symmetrical and CN II-XII grossly intact with no noted

focal neuro deficits


Psych: Alert and oriented to person, place, time, and situation. Appropriate and

pleasant affect. 





Assessment and Plan of Care:





Patient is a pleasant 83-year-old male with a past medical history of chronic 

systolic heart failure with previously known EF 40-45% and presented to the 

emergency department on 8/8/23 with progressively worsening shortness of breath 

over the past few days.  He was admitted for acute on chronic systolic heart 

failure exacerbation. 





Patient seen and fully evaluated at bedside.  He reports continued shortness of 

breath has improved.  RN reports patient was only able to walk up to window in 

room and was noted to have significant shortness of breath and difficulty 

catching his breath.  Cardiology and pulmonology have signed off with patient.  

Initial plan was for discharge, however will obtain a repeat x-ray of chest and 

if no improvement or worsening Will place patient back on IV diuresis as 

discussed with nephrologist, however if x-ray shows improvement Plan will be 

for discharge patient home on oral Lasix 40 mg twice daily.








Acute on chronic systolic heart failure with previously known EF of 40-45%


Pseudomass left upper lobe


Acute kidney injury on chronic kidney disease


Moderate pulmonary hypertension


Elevated troponin, chronic secondary to chronic kidney disease and chronic 

systolic heart failure


Cardiomyopathy


History of CAD


Hypertension


Hyperlipidemia


Chronic kidney disease stage IIIB


Hypothyroidism


Non-insulin-dependent diabetes mellitus type II





-Cardiology  evaluated and reviewed documentation in chart.  Cardiology clearing

patient from cardiac perspective recommending outpatient follow-up in office 

with Dr. Mcelroy in 2 weeks.


-Pulmonology following secondary to large pseudomass in left upper lobe, and 

discussed plan of care with pulmonaryNP and they are recommending continued 

treatment of congestive heart failure as well as congestive heart failure is 

fully treated pseudomass should improve.


-Continue Telemetry monitoring


-Renal function continues to slightly elevate BUN 49, creatinine 2.16, and GFR 

of 27., 


-Order placed for repeat morning chest x-ray 


-If chest x-ray shows improvement, we'll plan for discharge home on oral 

diuretics and if chest x-ray shows little to no improvement or worsening of CHF 

will place patient back on IV Lasix 


-Nephrologist following and discussed plan of care at bedside, nephrologist in 

agreement is little to no improvement with chest x-ray patient may return to IV 

Lasix.  


-Continued Daily weights and Close monitoring of I's and O's


-Cardiac diet with 1500 mg sodium restriction daily 


-Order placed for urinalysis 


-Continue glycemic protocol with NovoLog sliding scale


-We will replace repeat morning BMP and magnesium for continued close monitoring

of electrolytes and renal function while diuresing. 


-Patient to continue current cardiac medication regimen with aspirin 81 mg 

daily, atorvastatin 20 mg daily, carvedilol 6.25 mg twice daily, Lasix 80 mg 

daily, hydralazine 25 mg 3 times daily, isosorbide mononitrate 30 mg daily, and 

losartan 25 mg daily.





Data review:


-Morning labs reviewed.  CBC showing stable normocytic anemia with hemoglobin of

10.6.  BMP revealing continued elevation of renal function with BUN of 49, 

creatinine 2.16, and GFR of 27.  


-Vital signs stable blood pressure 122/55, heart rate 50, respiratory rate 18, 

temp 98.0F, SpO2 of 93% on room air.





Imaging reviewed:


-No new testing for review.  Repeat x-ray was ordered.











CODE STATUS: Full code


DVT prophylaxis: Heparin


Discussed with: Patient, RN, pulmonary NP, and nephrologist  


Anticipated discharge date: clinical course to determine 


Anticipated discharge place: Home


Patient was seen independently by Nurse Practitioner.


This document was prepared using Dragon dictation software.  Please allow for 

errors in transcription while rare they do occur.





Luiz Valentine NP rendered care for this patient independently, reviewed the 

findings and plan as documented in the note above.  I did not physically speak 

with or examine the patient on this date. 





Objective





- Vital Signs


Vital signs: 


                                   Vital Signs











Temp  98.0 F   08/11/23 08:00


 


Pulse  50 L  08/11/23 08:00


 


Resp  18   08/11/23 08:00


 


BP  122/55   08/11/23 08:00


 


Pulse Ox  93 L  08/11/23 08:00


 


FiO2      








                                 Intake & Output











 08/10/23 08/11/23 08/11/23





 18:59 06:59 18:59


 


Intake Total 358  240


 


Output Total  800 


 


Balance 358 -800 240


 


Weight  99.1 kg 


 


Intake:   


 


  Oral 358  240


 


Output:   


 


  Urine  800 


 


Other:   


 


  Voiding Method  Urinal 














- Labs


CBC & Chem 7: 


                                 08/11/23 05:57





                                 08/11/23 05:57


Labs: 


                  Abnormal Lab Results - Last 24 Hours (Table)











  08/10/23 08/10/23 08/10/23 Range/Units





  11:53 16:17 19:53 


 


RBC     (4.30-5.90)  m/uL


 


Hgb     (13.0-17.5)  gm/dL


 


Hct     (39.0-53.0)  %


 


BUN     (9-20)  mg/dL


 


Creatinine     (0.66-1.25)  mg/dL


 


Glucose     (74-99)  mg/dL


 


POC Glucose (mg/dL)  181 H  164 H  194 H  ()  mg/dL


 


Calcium     (8.4-10.2)  mg/dL














  08/11/23 08/11/23 08/11/23 Range/Units





  05:50 05:57 05:57 


 


RBC   3.66 L   (4.30-5.90)  m/uL


 


Hgb   10.6 L   (13.0-17.5)  gm/dL


 


Hct   32.9 L   (39.0-53.0)  %


 


BUN    49 H  (9-20)  mg/dL


 


Creatinine    2.16 H  (0.66-1.25)  mg/dL


 


Glucose    159 H  (74-99)  mg/dL


 


POC Glucose (mg/dL)  161 H    ()  mg/dL


 


Calcium    7.0 L  (8.4-10.2)  mg/dL

## 2023-08-11 NOTE — XR
EXAMINATION TYPE: XR chest 1V portable

 

DATE OF EXAM: 8/11/2023

 

Comparison: 8/8/2023

 

Clinical History: 83-year-old male CHF, pleural effusions

 

Findings:

Heart is mildly enlarged. Diffuse patchy interstitial opacities persist without significant change. S
uspect trace pleural effusions. Similar signal mass projecting at the left upper lobe compatible with
 fluid tracking along the left major fissure.

 

 

Impression:

Ongoing CHF with interstitial pulmonary edema and small effusions, some pleural fluid trapped along t
he left major fissure again noted.

## 2023-08-12 VITALS — RESPIRATION RATE: 18 BRPM

## 2023-08-12 LAB
ALBUMIN SERPL-MCNC: 3.2 G/DL (ref 3.5–5)
ALP SERPL-CCNC: 90 U/L (ref 38–126)
ALT SERPL-CCNC: 18 U/L (ref 4–49)
ANION GAP SERPL CALC-SCNC: 11 MMOL/L
AST SERPL-CCNC: 21 U/L (ref 17–59)
BUN SERPL-SCNC: 50 MG/DL (ref 9–20)
CALCIUM SPEC-MCNC: 6.7 MG/DL (ref 8.4–10.2)
CHLORIDE SERPL-SCNC: 101 MMOL/L (ref 98–107)
CO2 SERPL-SCNC: 26 MMOL/L (ref 22–30)
ERYTHROCYTE [DISTWIDTH] IN BLOOD BY AUTOMATED COUNT: 3.35 M/UL (ref 4.3–5.9)
ERYTHROCYTE [DISTWIDTH] IN BLOOD: 14.8 % (ref 11.5–15.5)
GLUCOSE BLD-MCNC: 136 MG/DL (ref 70–110)
GLUCOSE BLD-MCNC: 161 MG/DL (ref 70–110)
GLUCOSE BLD-MCNC: 186 MG/DL (ref 70–110)
GLUCOSE BLD-MCNC: 249 MG/DL (ref 70–110)
GLUCOSE SERPL-MCNC: 336 MG/DL (ref 74–99)
HCT VFR BLD AUTO: 30.5 % (ref 39–53)
HGB BLD-MCNC: 10 GM/DL (ref 13–17.5)
MAGNESIUM SPEC-SCNC: 2.3 MG/DL (ref 1.6–2.3)
MCH RBC QN AUTO: 29.8 PG (ref 25–35)
MCHC RBC AUTO-ENTMCNC: 32.7 G/DL (ref 31–37)
MCV RBC AUTO: 91 FL (ref 80–100)
PLATELET # BLD AUTO: 276 K/UL (ref 150–450)
POTASSIUM SERPL-SCNC: 3.7 MMOL/L (ref 3.5–5.1)
PROT SERPL-MCNC: 6.3 G/DL (ref 6.3–8.2)
SODIUM SERPL-SCNC: 138 MMOL/L (ref 137–145)
WBC # BLD AUTO: 5.7 K/UL (ref 3.8–10.6)

## 2023-08-12 RX ADMIN — HEPARIN SODIUM SCH UNIT: 5000 INJECTION, SOLUTION INTRAVENOUS; SUBCUTANEOUS at 08:39

## 2023-08-12 RX ADMIN — INSULIN ASPART SCH UNIT: 100 INJECTION, SOLUTION INTRAVENOUS; SUBCUTANEOUS at 06:34

## 2023-08-12 RX ADMIN — ATORVASTATIN CALCIUM SCH MG: 20 TABLET, FILM COATED ORAL at 20:22

## 2023-08-12 RX ADMIN — INSULIN ASPART SCH UNIT: 100 INJECTION, SOLUTION INTRAVENOUS; SUBCUTANEOUS at 20:22

## 2023-08-12 RX ADMIN — INSULIN ASPART SCH: 100 INJECTION, SOLUTION INTRAVENOUS; SUBCUTANEOUS at 16:15

## 2023-08-12 RX ADMIN — FUROSEMIDE SCH MG: 10 INJECTION, SOLUTION INTRAMUSCULAR; INTRAVENOUS at 20:22

## 2023-08-12 RX ADMIN — FUROSEMIDE SCH MG: 10 INJECTION, SOLUTION INTRAMUSCULAR; INTRAVENOUS at 08:39

## 2023-08-12 RX ADMIN — HEPARIN SODIUM SCH UNIT: 5000 INJECTION, SOLUTION INTRAVENOUS; SUBCUTANEOUS at 20:22

## 2023-08-12 RX ADMIN — LOSARTAN POTASSIUM SCH MG: 25 TABLET, FILM COATED ORAL at 08:39

## 2023-08-12 RX ADMIN — ASPIRIN 81 MG CHEWABLE TABLET SCH MG: 81 TABLET CHEWABLE at 08:39

## 2023-08-12 RX ADMIN — INSULIN ASPART SCH UNIT: 100 INJECTION, SOLUTION INTRAVENOUS; SUBCUTANEOUS at 12:03

## 2023-08-12 RX ADMIN — ISOSORBIDE MONONITRATE SCH MG: 30 TABLET, EXTENDED RELEASE ORAL at 08:39

## 2023-08-12 RX ADMIN — LEVOTHYROXINE SODIUM SCH MCG: 0.12 TABLET ORAL at 06:34

## 2023-08-12 NOTE — P.PN
Subjective


Progress Note Date: 08/12/23





Hospital course:





Patient is a pleasant 83-year-old male with a past medical history of 

cardiomyopathy, chronic systolic heart failure with previously known EF of 40-

45%, hypertension, hyperlipidemia, chronic kidney disease stage IIIB 

hypothyroidism, and non-insulin-dependent diabetes mellitus.  He presented to 

the emergency department with a chief complaint of progressively worsening 

shortness of breath along with unilateral leg swelling left lower extremity.  He

underwent full evaluation in the emergency department. EKG completed showing 

sinus bradycardia at 58 bpm with frequent PVCs and a left bundle branch block, 

left bundle branch block previously known and showing no changes when compared 

to EKG completed on 10/7/22 upon personal review and interpretation.  Chest x-

ray completed showing left upper lobe masslike area measuring approximately 8.9 

x 6.4 cm.  Labs completed and reviewed.  Initial troponin 0.045.  Normocytic 

anemia with hemoglobin 11.9.  Coagulation profile normal findings.  BMP 

consistent with stage III chronic kidney disease with BUN of 36, creatinine 

1.67, and GFR of 37 which is at baseline creatinine levels.  Liver profile sh

owing no significant abnormalities.  ProBNP elevated at 21,400.  Patient was 

admitted under our services with consultation to cardiology for CHF exacerbation

and pulmonology for left upper lobe pseudomass.  Left lower extremity Doppler 

was completed secondary to mild swelling and was negative for DVT.  Patient was 

admitted under our services with consultation to cardiology.  Troponins trended 

and flat resulting at 0.045, 0.050, and 0.045. Echocardiogram completed showing 

impaired EF of 40-45% with inferior wall hypokinesia, moderate pulmonary 

hypertension and mild to moderate mitral and tricuspid regurgitation. CT chest 

without contrast completed showing a pseudomass noted within the left upper lobe

showing fluid trapped within the fissure measuring approximately 5.8 x 3.5 cm 

along with scattered infiltrates and bilateral pleural effusions and pulmonary 

venous congestion consistent with congestive heart failure along with 

nonspecific mediastinal adenopathy.  Pulmonology evaluated patient and clearing 

patient from their perspective on 8/11/23 stating once congestive heart failure 

is adequately controlled with diuretics pseudomass should resolve on its own and

patient can follow-up in their office on an outpatient basis and they will 

arrange for repeat CT in 2 months.  Cardiology evaluated and cleared patient 

from cardiac standpoint on 8/10/23.  However, patient with acute kidney injury 

with creatinine increasing from 1.67-2.16 and was not medically stable for 

discharge as he requires diuretics for his congestive heart failure.  Nephrology

evaluated and recommended patient go back on IV diuresis as acute kidney injury 

is secondary to ATN resulting from cardiorenal syndrome.  Repeat chest x-ray was

also completed on 8/11/23 showing ongoing CHF with interstitial pulmonary edema 

and small bilateral pleural effusions with pleural fluid trapped along the left 

major fissure unchanged from previous chest x-ray.  Patient started back on 

Lasix 40 mg IVP twice daily.  Once placed back on IV Lasix urinary output 

significantly increased from previous 600-800 daily now to 1800 mL over the past

24 hours, patient is showing improvement and did have slight improvement in 

renal function as well with creatinine improving to 2.07 and GFR increasing to 

29.





Physical exam:





Patient seen and fully evaluated at bedside this morning.  Overall patient does 

report breathing better but again continues to have significant shortness of 

breath with exertion per patient and nursing staff at bedside.  Patient had 

successful IV diuresis over the past 24 hours with 1800 mL of output. 





Vital signs reviewed and stable. 


General: Nontoxic, no distress and appears stated age.  


Derm: Skin warm and dry, normal coloration for ethnicity.


Head: Atraumatic, normocephalic and symmetric.  


Eyes: EOMs intact, no lid lag, and anicteric sclera


Mouth: no lip lesions, mucus membranes moist


Cardiovascular: regular rate and rhythm with normal S1S2, systolic murmur, 

positive posterior tibial pulses bilaterally, and cap refill < 2 seconds.  


Lungs: Respirations even, regular, and unlabored on room air. Lungs with good 

air entry and exit and continued bibasilar crackles and soft expiratory wheezes 

with coarse nonproductive cough.  No accessory muscle usage   And no 

conversational dyspnea noted during examination today.


Abdominal: soft, nontender to palpation, no guarding, no appreciable 

organomegaly


Ext: ROM intact. No gross muscle atrophy, left lower extremity 1+ pitting edema 

right lower extremity no edema.  no contractures


Neuro: Speech clear, face symmetrical and CN II-XII grossly intact with no noted

focal neuro deficits


Psych: Alert and oriented to person, place, time, and situation. Appropriate and

pleasant affect. 





Assessment and Plan of Care:





Patient is a pleasant 83-year-old male with a past medical history of chronic 

systolic heart failure with previously known EF 40-45% and presented to the 

emergency department on 8/8/23 with progressively worsening shortness of breath 

over the past few days.  He was admitted for acute on chronic systolic heart 

failure exacerbation and pseudomass of left upper lobe.  Cardiology and 

pulmonology were consulted.





Acute on chronic systolic heart failure with previously known EF of 40-45%


Pseudomass left upper lobe


Acute kidney injury on chronic kidney disease


Moderate pulmonary hypertension


Elevated troponin, chronic secondary to chronic kidney disease and chronic 

systolic heart failure


Cardiomyopathy


History of CAD


Hypertension


Hyperlipidemia


Chronic kidney disease stage IIIB


Hypothyroidism


Non-insulin-dependent diabetes mellitus type II


-Cardiology evaluated and cleared patient from cardiac standpoint on 8/10/23 

stating patient may resume his oral home dose of Lasix and follow-up outpatient 

in their office in 2 weeks.  


-Pulmonology evaluated patient and clearing patient from their perspective on 

8/11/23 stating once congestive heart failure is adequately treated with 

diuretics pseudomass should resolve on its own and patient can follow-up in 

their office on an outpatient basis and they will arrange for repeat CT in 2 

months. 


-However, patient with acute kidney injury with creatinine increasing from 1.67-

2.16 and was not medically stable for discharge as he requires diuretics for his

congestive heart failure.  


-Nephrology evaluated and recommended patient go back on IV diuresis as acute 

kidney injury is secondary to ATN resulting from cardiorenal syndrome.  


-Repeat chest x-ray was also completed on 8/11/23 and radiology report reviewed 

showing ongoing CHF with interstitial pulmonary edema and small bilateral 

pleural effusions with pleural fluid trapped along the left major fissure 

unchanged from previous chest x-ray.  


-Patient started back on Lasix 40 mg IVP twice daily.  


-Since being placed back on IV Lasix, patient's urinary output significantly 

increased from previous 600-800 daily now to 1800 mL over the past 24 hours, floyd michaels is showing improvement clinically and did have slight improvement in renal

function as well with creatinine improving to 2.07 and GFR increasing to 29.


-Discussed in detail with nephrologist, Dr. Agustin and she is recommending 

another 24 hours of IV diuresis prior to discharging patient home on oral 

diuretics.  Home dose of Lasix will be changed upon discharge. 


-Continue Daily weights and Close monitoring of I's and O's


-Cardiac diet with 1500 mg sodium restriction daily 


-Order placed for repeat morning BMP and magnesium for continued close 

monitoring of electrolytes and renal function while diuresing. 


-Patient to continue current cardiac medication regimen with aspirin 81 mg 

daily, atorvastatin 20 mg daily, carvedilol 6.25 mg twice daily, hydralazine 25 

mg 3 times daily, isosorbide mononitrate 30 mg daily, and losartan 25 mg daily.





Non-insulin-dependent diabetes mellitus type II


-Continue glycemic protocol with NovoLog sliding scale





Data review:


-Morning labs reviewed.  CBC showing stable normocytic anemia with hemoglobin of

10.0.  BMP revealing slight improvement in renal function with BUN of 50, 

creatinine 2.07, and GFR of 29.  Electrolytes stable with sodium 138, potassium 

3.7, chloride 101, and magnesium 2.3.





-Vital signs stable blood pressure 120/54, heart rate 62, respiratory rate 15, 

temp 97.6F, SpO2 of 95% on room air.





Imaging reviewed:


-No new testing for review.  








CODE STATUS: Full code


DVT prophylaxis: Heparin


Discussed with: Patient, RN, and nephrologist  


Anticipated discharge date: Within the next 24 hours


Anticipated discharge place: Home


Patient was seen independently by Nurse Practitioner.


This document was prepared using Dragon dictation software.  Please allow for 

errors in transcription while rare they do occur.





Luiz Valentine NP rendered care for this patient independently, reviewed the 

findings and plan as documented in the note above.  I did not physically speak 

with or examine the patient on this date. 





Objective





- Vital Signs


Vital signs: 


                                   Vital Signs











Temp  97.6 F   08/12/23 08:34


 


Pulse  62   08/12/23 08:34


 


Resp  15   08/12/23 08:34


 


BP  120/54   08/12/23 08:34


 


Pulse Ox  95   08/12/23 08:34


 


FiO2      








                                 Intake & Output











 08/11/23 08/12/23 08/12/23





 18:59 06:59 18:59


 


Intake Total 718  118


 


Output Total 600 1200 250


 


Balance 118 -1200 -132


 


Weight  98.2 kg 


 


Intake:   


 


  Oral 718  118


 


Output:   


 


  Urine 600 1200 250


 


Other:   


 


  Voiding Method  Urinal Urinal


 


  # Voids   1














- Labs


CBC & Chem 7: 


                                 08/12/23 08:41





                                 08/12/23 08:41


Labs: 


                  Abnormal Lab Results - Last 24 Hours (Table)











  08/11/23 08/11/23 08/11/23 Range/Units





  11:41 16:08 16:58 


 


POC Glucose (mg/dL)  186 H  182 H   ()  mg/dL


 


Urine Glucose (UA)    3+ H  (Negative)  














  08/11/23 08/12/23 Range/Units





  20:07 06:02 


 


POC Glucose (mg/dL)  163 H  161 H  ()  mg/dL


 


Urine Glucose (UA)    (Negative)

## 2023-08-12 NOTE — P.PN
Subjective





Patient is seen for follow-up for acute kidney injury on top of chronic kidney 

disease.


He is currently being diuresed for volume overload. 


Serum creatinine staying at 2.1-2.0 mg/dL.


Lasix was changed to IV yesterday.  Patient has had about 1800 mL of urine.


Overall feeling slightly better.





Objective





- Vital Signs


Vital signs: 


                                   Vital Signs











Temp  97.6 F   08/12/23 08:34


 


Pulse  69   08/12/23 11:58


 


Resp  15   08/12/23 08:34


 


BP  134/49   08/12/23 11:58


 


Pulse Ox  94 L  08/12/23 11:58


 


FiO2      








                                 Intake & Output











 08/11/23 08/12/23 08/12/23





 18:59 06:59 18:59


 


Intake Total 718  1198


 


Output Total 600 1200 500


 


Balance 118 -1200 698


 


Weight  98.2 kg 98.7 kg


 


Intake:   


 


  Oral 718  1198


 


Output:   


 


  Urine 600 1200 500


 


Other:   


 


  Voiding Method  Urinal Urinal


 


  # Voids   1














- Exam





Awake, comfortable, in no acute distress


Examination of the heart S1 and S2


Examination of the lungs bilateral breath sounds are heard


Abdomen is soft nontender


Examination of lower extremities shows no significant edema


CNS exam grossly intact





- Labs


CBC & Chem 7: 


                                 08/12/23 08:41





                                 08/12/23 08:41


Labs: 


                  Abnormal Lab Results - Last 24 Hours (Table)











  08/11/23 08/11/23 08/11/23 Range/Units





  16:08 16:58 20:07 


 


RBC     (4.30-5.90)  m/uL


 


Hgb     (13.0-17.5)  gm/dL


 


Hct     (39.0-53.0)  %


 


BUN     (9-20)  mg/dL


 


Creatinine     (0.66-1.25)  mg/dL


 


Glucose     (74-99)  mg/dL


 


POC Glucose (mg/dL)  182 H   163 H  ()  mg/dL


 


Calcium     (8.4-10.2)  mg/dL


 


Albumin     (3.5-5.0)  g/dL


 


Urine Glucose (UA)   3+ H   (Negative)  














  08/12/23 08/12/23 08/12/23 Range/Units





  06:02 08:41 08:41 


 


RBC   3.35 L   (4.30-5.90)  m/uL


 


Hgb   10.0 L   (13.0-17.5)  gm/dL


 


Hct   30.5 L   (39.0-53.0)  %


 


BUN    50 H  (9-20)  mg/dL


 


Creatinine    2.07 H  (0.66-1.25)  mg/dL


 


Glucose    336 H  (74-99)  mg/dL


 


POC Glucose (mg/dL)  161 H    ()  mg/dL


 


Calcium    6.7 L  (8.4-10.2)  mg/dL


 


Albumin    3.2 L  (3.5-5.0)  g/dL


 


Urine Glucose (UA)     (Negative)  














  08/12/23 Range/Units





  11:37 


 


RBC   (4.30-5.90)  m/uL


 


Hgb   (13.0-17.5)  gm/dL


 


Hct   (39.0-53.0)  %


 


BUN   (9-20)  mg/dL


 


Creatinine   (0.66-1.25)  mg/dL


 


Glucose   (74-99)  mg/dL


 


POC Glucose (mg/dL)  249 H  ()  mg/dL


 


Calcium   (8.4-10.2)  mg/dL


 


Albumin   (3.5-5.0)  g/dL


 


Urine Glucose (UA)   (Negative)  














Assessment and Plan


Assessment: 





1.  Acute kidney injury secondary to ATN secondary to cardiorenal syndrome.  

Creatinine 1.67 on admission and is 2.16 today.  Renal ultrasound from September 2022 showed normal-size kidneys without any evidence of hydronephrosis.


2.  Chronic kidney disease stage IIIB with baseline creatinine 1.6-1.9 secondary

to cardiorenal syndrome.


3.  Volume overload.  Improving diuresis.


4.  Diabetes mellitus.


5.  Acute on chronic systolic CHF with ejection fraction of 40-45 percent with 

mild to moderate mitral and tricuspid regurgitation and moderate to severe 

pulmonary hypertension.


6.  Hypertension with chronic kidney disease.


7.  Pseudomass in the left upper lobe with fluid filled in the fissure.  Plan is

to maintain diuresis.  No intervention needed per pulmonary


Plan: 





Continue with IV Lasix.


Repeat labs in a.m.

## 2023-08-12 NOTE — P.PN
Subjective


Progress Note Date: 08/12/23


Principal diagnosis: 





Acute congestive heart failure





I am seeing this patient in consultation today 08/10/2023 for acute CHF 

exacerbation.  Patient is a 83-year-old white male with past medical history 

significant for cardiomyopathy, congestive heart failure with a previous known 

ejection fraction of 40-45%, hypertension, hyperlipidemia, chronic kidney 

disease stage IIIB, hypothyroidism, and diabetes mellitus.  He presented to 

emergency room August 8th complaining of shortness of breath that started 3-4 

days prior.  The shortness of breath has progressively worsened, and is worse on

exertion. He does admit intermittent non-productive cough. He denies any chest 

pain, heart palpitations, lightheadedness, syncope, or lower extremity swelling.

 Denies infectious symptoms such as fever, chills, myalgias, productive cough. 

Chest CT demonstrated small to moderate bilateral pleural effusions and 

pulmonary vascular congestion consistent with congestive heart failure.  There 

was also a 5.8 x 3.5 cm pseuodomass within the fissure and nonspecific 

mediastinal adenopathy.  The mass is a fluid collection within the fissure, and 

not concerning for malignancy.  Repeat echocardiogram shows a reduced left 

ventricular ejection fraction of 40-45%, moderate pulmonary hypertension with 

and RVSP of 50, and mild to moderate tricuspid regurgitation. NT proBNP was 

significantly elevated at 21,400.  The patient has been started on diuretics.  

Patient is currently sitting up in bed, on room air, in no acute distress.  

Patient continues to have some shortness breath on exertion.  Reportedly had 

some unilateral lower extremity edema on arrival, venous Dopplers negative for 

left lower extremity DVT.  Most recent CBC shows some mild anemia with 

hemoglobin of 10.8, hematocrit 33.1, platelets 283, WBC 7.7.  BMP from yesterday

shows a sodium 138, potassium 3.5, chloride 100, serum bicarb 26, BUN 42, 

creatinine 2.03, glucose 146.  Troponins have been mildly elevated at 0.045 and 

are stable.  ECG on arrival shows sinus rhythm with frequent multifocal PVCs.  

Patient does seem hemodynamically stable at this time.





The patient is seen today 08/11/2023 in follow-up on the selective care unit.  

He is currently sitting up in a chair at the bedside.  Awake and alert in no 

acute distress.  He is maintaining good O2 saturations in the 90s on room air.  

He is making good urine.  Chest x-ray reveals evidence of interstitial pulmonary

edema with small effusions.  Some pleural fluid trapped in the left major 

fissure.  White count 8.0.  Hemoglobin 10.6.  Platelets 291.  Sodium 138.  Po

tassium 3.8.  Bicarb 23.  BUN 49.  Creatinine 2.16.  He is continued on Lasix 80

mg daily.





Reevaluated today on 8/12/2023, patient is doing great, asymptomatic, lungs are 

clear, hoping that he will be discharged home today.  From my perspective the 

patient could be discharged as long as he is cleared by cardiology.  And his 

Lasix dose on outpatient basis to be decided upon by cardiology on the case.  

CBC today is normal basic metabolic profile is normal BUN is 50 creatinine 2.07











Objective





- Vital Signs


Vital signs: 


                                   Vital Signs











Temp  97.6 F   08/12/23 08:34


 


Pulse  62   08/12/23 08:34


 


Resp  15   08/12/23 08:34


 


BP  120/54   08/12/23 08:34


 


Pulse Ox  95   08/12/23 08:34


 


FiO2      








                                 Intake & Output











 08/11/23 08/12/23 08/12/23





 18:59 06:59 18:59


 


Intake Total 718  118


 


Output Total 600 1200 250


 


Balance 118 -1200 -132


 


Weight  98.2 kg 98.7 kg


 


Intake:   


 


  Oral 718  118


 


Output:   


 


  Urine 600 1200 250


 


Other:   


 


  Voiding Method  Urinal Urinal


 


  # Voids   1














- Exam





Physical Exam: Revealed 83-year-old white male in no distress on room air


Head: Atraumatic normocephalic.


HEENT:[Neck is supple.] [No neck masses.] [No thyromegaly.] [No JVD.]


Chest: [Clear throughout, no crackles, no rhonchi, no wheezes.]


Cardiac Exam: [Normal S1 and S2, no S3 gallop, no murmur.]


Abdomen: [Soft, nontender,  no megaly, no rebound, no guarding, normal bowel 

sounds.]


Extremities: [No clubbing, no edema, no cyanosis.]


Neurological Exam: [No focal neurologic deficit.]  Alert and oriented 3


Psychiatric: Normal mood affect and normal mental status examination.


Skin: No rashes





- Labs


CBC & Chem 7: 


                                 08/12/23 08:41





                                 08/12/23 08:41


Labs: 


                  Abnormal Lab Results - Last 24 Hours (Table)











  08/11/23 08/11/23 08/11/23 Range/Units





  11:41 16:08 16:58 


 


RBC     (4.30-5.90)  m/uL


 


Hgb     (13.0-17.5)  gm/dL


 


Hct     (39.0-53.0)  %


 


BUN     (9-20)  mg/dL


 


Creatinine     (0.66-1.25)  mg/dL


 


Glucose     (74-99)  mg/dL


 


POC Glucose (mg/dL)  186 H  182 H   ()  mg/dL


 


Calcium     (8.4-10.2)  mg/dL


 


Albumin     (3.5-5.0)  g/dL


 


Urine Glucose (UA)    3+ H  (Negative)  














  08/11/23 08/12/23 08/12/23 Range/Units





  20:07 06:02 08:41 


 


RBC    3.35 L  (4.30-5.90)  m/uL


 


Hgb    10.0 L  (13.0-17.5)  gm/dL


 


Hct    30.5 L  (39.0-53.0)  %


 


BUN     (9-20)  mg/dL


 


Creatinine     (0.66-1.25)  mg/dL


 


Glucose     (74-99)  mg/dL


 


POC Glucose (mg/dL)  163 H  161 H   ()  mg/dL


 


Calcium     (8.4-10.2)  mg/dL


 


Albumin     (3.5-5.0)  g/dL


 


Urine Glucose (UA)     (Negative)  














  08/12/23 Range/Units





  08:41 


 


RBC   (4.30-5.90)  m/uL


 


Hgb   (13.0-17.5)  gm/dL


 


Hct   (39.0-53.0)  %


 


BUN  50 H  (9-20)  mg/dL


 


Creatinine  2.07 H  (0.66-1.25)  mg/dL


 


Glucose  336 H  (74-99)  mg/dL


 


POC Glucose (mg/dL)   ()  mg/dL


 


Calcium  6.7 L  (8.4-10.2)  mg/dL


 


Albumin  3.2 L  (3.5-5.0)  g/dL


 


Urine Glucose (UA)   (Negative)  














Assessment and Plan


Assessment: 


Impression:


acute exacerbation of systolic congestive heart failure. 


Pseudotumor, fluid collection, noted on CT of the chest in the left upper lobe 

area


Cardiomyopathy, with a baseline ejection fraction of 40-45%


Elevated troponins, undetermined significance, stable


Anemia of chronic disease


Chronic kidney disease stage IIIB


Diabetes mellitus type 2, non-insulin-dependent


Essential hypertension


Hyperlipidemia


Obesity, with a BMI of 31 kg/m


Hypothyroidism


Remote ex-smoker, quit in 1967





Recommendation:


Clinically the patient


Chest x-ray is improving


Will clear the patient for discharge planning and follow-up on outpatient basis


Would recommend a repeat CT of the chest in few months


Again cleared for discharge if cleared by cardiology





Time with Patient: Less than 30

## 2023-08-13 VITALS — SYSTOLIC BLOOD PRESSURE: 153 MMHG | TEMPERATURE: 97.9 F | HEART RATE: 67 BPM | DIASTOLIC BLOOD PRESSURE: 78 MMHG

## 2023-08-13 LAB — GLUCOSE BLD-MCNC: 163 MG/DL (ref 70–110)

## 2023-08-13 RX ADMIN — FUROSEMIDE SCH MG: 10 INJECTION, SOLUTION INTRAMUSCULAR; INTRAVENOUS at 08:58

## 2023-08-13 RX ADMIN — ISOSORBIDE MONONITRATE SCH MG: 30 TABLET, EXTENDED RELEASE ORAL at 08:58

## 2023-08-13 RX ADMIN — HEPARIN SODIUM SCH UNIT: 5000 INJECTION, SOLUTION INTRAVENOUS; SUBCUTANEOUS at 08:58

## 2023-08-13 RX ADMIN — LOSARTAN POTASSIUM SCH MG: 25 TABLET, FILM COATED ORAL at 08:58

## 2023-08-13 RX ADMIN — LEVOTHYROXINE SODIUM SCH MCG: 0.12 TABLET ORAL at 06:10

## 2023-08-13 RX ADMIN — ASPIRIN 81 MG CHEWABLE TABLET SCH MG: 81 TABLET CHEWABLE at 08:58

## 2023-08-13 RX ADMIN — INSULIN ASPART SCH UNIT: 100 INJECTION, SOLUTION INTRAVENOUS; SUBCUTANEOUS at 06:11

## 2023-08-13 NOTE — P.DS
Providers


Date of admission: 


08/08/23 07:00





Expected date of discharge: 08/13/23


Attending physician: 


Veronica Kraft DO





Consults: 





                                        





08/08/23 07:00


Consult Physician Routine 


   Consulting Provider: Elieser Ma


   Consult Reason/Comments: CHF


   Do you want consulting provider notified?: Yes





08/09/23 16:13


Consult Physician Routine 


   Consulting Provider: Per Bauer


   Consult Reason/Comments: large psuedotumor


   Do you want consulting provider notified?: Yes





08/11/23 09:15


Consult Physician Routine 


   Consulting Provider: Jose Luis Martins


   Consult Reason/Comments: MERLINE on CKD stage IIIb


   Do you want consulting provider notified?: Yes











Primary care physician: 


[facility]





Hospital Course: 





Discharge Diagnosis:





Acute on chronic systolic heart failure with previously known EF of 40-45%.  

Patient underwent a 65 night hospitalization for treatment of acute on chronic 

systolic congestive heart failure.  Patient was treated with IV diuresis and 

sustained an acute kidney injury and adjustments were made to diuretics.  

Patient had successful diuresis and being discharged home on Lasix 60 mg twice 

daily, hydralazine 25 mg 3 times daily, aspirin 81 mg daily, losartan 25 mg 

daily, carvedilol 6.25 mg twice daily, and isosorbide mononitrate 30 mg daily. 





Pseudomass left upper lobe, pulmonologist stating continued successful treatment

of heart failure with diuretics should result in resolution of pseudomass.  Will

need follow-up CT in 2 months.  Patient to follow up outpatient with their 

office in 1-2 weeks.





Acute kidney injury on chronic kidney disease stage IIIB secondary to ATN 

resulting from cardiorenal syndrome.  Patient discharged with prescription for 

repeat labs for BMP in 1-2 days, [facility] 

[facility] management.  Patient to follow-up with nephrologist in 1 week.





Moderate pulmonary hypertension





Elevated troponin, chronic secondary to chronic kidney disease and chronic 

systolic heart failure.





Cardiomyopathy





History of CAD





Hypertension





Hyperlipidemia





Chronic kidney disease stage IIIB





Hypothyroidism





Non-insulin-dependent diabetes mellitus type II





Hospital Course: 





Patient is a pleasant 83-year-old male with a past medical history of 

cardiomyopathy, chronic systolic heart failure with previously known EF of 40-

45%, hypertension, hyperlipidemia, chronic kidney disease stage IIIB 

hypothyroidism, and non-insulin-dependent diabetes mellitus.  He presented to 

the emergency department with a chief complaint of progressively worsening 

shortness of breath along with unilateral leg swelling left lower extremity.  He

underwent full evaluation in the emergency department. EKG completed showing 

sinus bradycardia at 58 bpm with frequent PVCs and a left bundle branch block, 

left bundle branch block previously known and showing no changes when compared 

to EKG completed on 10/7/22 upon personal review and interpretation.  Chest x-

ray completed showing left upper lobe masslike area measuring approximately 8.9 

x 6.4 cm.  Labs completed and reviewed.  Initial troponin 0.045.  Normocytic 

anemia with hemoglobin 11.9.  Coagulation profile normal findings.  BMP 

consistent with stage III chronic kidney disease with BUN of 36, creatinine 

1.67, and GFR of 37 which is at baseline creatinine levels.  Liver profile 

showing no significant abnormalities.  ProBNP elevated at 21,400.  Patient was 

admitted under our services with consultation to cardiology for CHF exacerbation

and pulmonology for left upper lobe pseudomass.  Left lower extremity Doppler 

was completed secondary to mild swelling and was negative for DVT.  Patient was 

admitted under our services with consultation to cardiology.  Troponins trended 

and flat resulting at 0.045, 0.050, and 0.045. Echocardiogram completed showing 

impaired EF of 40-45% with inferior wall hypokinesia, moderate pulmonary 

hypertension and mild to moderate mitral and tricuspid regurgitation. CT chest 

without contrast completed showing a pseudomass noted within the left upper lobe

showing fluid trapped within the fissure measuring approximately 5.8 x 3.5 cm 

along with scattered infiltrates and bilateral pleural effusions and pulmonary 

venous congestion consistent with congestive heart failure along with 

nonspecific mediastinal adenopathy.  Pulmonology evaluated patient and clearing 

patient from their perspective on 8/11/23 stating once congestive heart failure 

is adequately controlled with diuretics pseudomass should resolve on its own and

patient can follow-up in their office on an outpatient basis and they will 

arrange for repeat CT in 2 months.  Cardiology evaluated and cleared patient 

from cardiac standpoint on 8/10/23.  However, patient with acute kidney injury 

with creatinine increasing from 1.67-2.16 and was not medically stable for 

discharge as he requires diuretics for his congestive heart failure.  Nephrology

evaluated and recommended patient go back on IV diuresis as acute kidney injury 

is secondary to ATN resulting from cardiorenal syndrome.  Repeat chest x-ray was

also completed on 8/11/23 showing ongoing CHF with interstitial pulmonary edema 

and small bilateral pleural effusions with pleural fluid trapped along the left 

major fissure unchanged from previous chest x-ray.  Patient started back on 

Lasix 40 mg IVP twice daily.  Once placed back on IV Lasix urinary output 

significantly increased from previous 600-800 daily now to 1800 mL over the past

24 hours, patient is showing improvement and did have slight improvement in 

renal function as well with creatinine improving to 2.07 and GFR increasing to 

29.  Patient remained on IV diuresis for an additional 24 hours and again had 

greater than 1500 mL of urinary output.  Morning labs remain pending.  Clin

ically, patient appears to be doing well and has been able to walk further 

distances without any difficulties.  He reports feeling much better and denies 

having any needs or complaints.  Ambulatory pulse ox completed and patient 

remained greater than 90% with ambulation.  Medically stable for discharge at 

this time.  Patient has been cleared by both cardiologist and pulmonologist in 

nephrology recommending increasing Lasix to 60 mg twice daily with repeat BMP in

3 days to monitor renal function.  Based upon renal function, may discuss 

decreasing dose back down to 40 mg twice daily.  [facility] and 

patient provided with 2 days of new medications until he can get his 

[facility] tomorrow.  Patient to follow up outpatient with PCP, 

cardiologist, pulmonologist, and nephrologist.





Physical exam:





Vital signs reviewed and stable. 


General: Nontoxic, no distress and appears stated age.  


Derm: Skin warm and dry, normal coloration for ethnicity.


Head: Atraumatic, normocephalic and symmetric.  


Eyes: EOMs intact, no lid lag, and anicteric sclera


Mouth: no lip lesions, mucus membranes moist


Cardiovascular: regular rate and rhythm with normal S1S2, systolic murmur, 

positive posterior tibial pulses bilaterally, and cap refill < 2 seconds.  


Lungs: Respirations even, regular, and unlabored on room air. Lungs with good ai

r entry and exit and continued bibasilar crackles no wheezes, rhonchi, or rales.

 No accessory muscle usage   And no conversational dyspnea noted during 

examination.


Abdominal: soft, nontender to palpation, no guarding, no appreciable 

organomegaly


Ext: ROM intact. No gross muscle atrophy, scant left lower extremity pitting 

edema right lower extremity no edema.  no contractures


Neuro: Speech clear, face symmetrical and CN II-XII grossly intact with no noted

focal neuro deficits


Psych: Alert and oriented to person, place, time, and situation. Appropriate and

pleasant affect. 








A total of 39 minutes of time were spent preparing this complex discharge 

summary.


Pt was discharged on 8/13/23 at 9:59 AM.


Patient was seen independently by Nurse Practitioner.


This document was prepared using Dragon dictation software.  Please allow for 

errors in transcription while rare they do occur.








I reviewed the documentation as provided by the TATI above, who is the original 

author of this note.  I agree with the documented assessment and plan, with the 

following changes: none


Patient Condition at Discharge: Stable





Plan - Discharge Summary


Discharge Rx Participant: No


New Discharge Prescriptions: 


New


   hydrALAZINE HCL [Apresoline] 25 mg PO TID 30 Days #90 tab


   Aspirin 81 mg PO DAILY 30 Days #30 tab


   Losartan [Cozaar] 25 mg PO DAILY 30 Days #30 tab


   carvediloL [Coreg] 6.25 mg PO BID-W/MEALS 30 Days #60 tab


   Isosorbide Mononitrate ER [Imdur] 30 mg PO DAILY 30 Days #30 tab


   Furosemide [Lasix] 60 mg PO BID@0900,1600 30 Days #180 tab





Continue


   Levothyroxine Sodium [Synthroid] 125 mcg PO DAILY


   metFORMIN HCL [Glucophage] 500 mg PO BID


   amLODIPine [Norvasc] 5 mg PO DAILY 60 Days #60 tab


   Simvastatin [Zocor] 40 mg PO HS


   Empagliflozin [Jardiance] 25 mg PO DAILY





Discontinued


   Furosemide [Lasix] 80 mg PO DAILY


Discharge Medication List





Levothyroxine Sodium [Synthroid] 125 mcg PO DAILY 11/16/21 [History]


Simvastatin [Zocor] 40 mg PO HS 11/16/21 [History]


metFORMIN HCL [Glucophage] 500 mg PO BID 09/21/22 [History]


amLODIPine [Norvasc] 5 mg PO DAILY 60 Days #60 tab 10/09/22 [Rx]


Empagliflozin [Jardiance] 25 mg PO DAILY 08/08/23 [History]


Aspirin 81 mg PO DAILY 30 Days #30 tab 08/13/23 [Rx]


Furosemide [Lasix] 60 mg PO BID@0900,1600 30 Days #180 tab 08/13/23 [Rx]


Isosorbide Mononitrate ER [Imdur] 30 mg PO DAILY 30 Days #30 tab 08/13/23 [Rx]


Losartan [Cozaar] 25 mg PO DAILY 30 Days #30 tab 08/13/23 [Rx]


carvediloL [Coreg] 6.25 mg PO BID-W/MEALS 30 Days #60 tab 08/13/23 [Rx]


hydrALAZINE HCL [Apresoline] 25 mg PO TID 30 Days #90 tab 08/13/23 [Rx]








Follow up Appointment(s)/Referral(s): 


Per Bauer MD [STAFF PHYSICIAN] - 1 Week (please call to make your appt )


Flaco Mcelroy MD [STAFF PHYSICIAN] - 08/21/23 3:00 pm


Promise Agustin MD [STAFF PHYSICIAN] - 1 Week


[facility] [Primary Care Provider] - 1-2 days (please call to schedule your 

appt )


Ambulatory/Diagnostic Orders: 


Basic Metabolic Panel [LAB.AMB] Time Frame: 3 Days, Location: None Selected


Patient Instructions/Handouts:  Heart Failure (DC)


Activity/Diet/Wound Care/Special Instructions: 


***[facility] [place] [phone] for new meds***








Activity:





As tolerated.  Take breaks as needed.





Diet: 





Heart healthy and carb consistent diet. Avoid salts, or foods with hidden salts 

such as canned or boxed foods and frozen dinners. Extra salt makes your heart 

work harder and traps the fluid in your body for longer.





Special Instructions:  





Take all of your medications as directed and remember to keep all of your 

doctor's appointments and follow-up as needed.  





Again, I would like to truly thank you for your service!!!! It is always an 

honor to provide care for a [place]!!!!!!





Thank you for allowing us to participate in your care, it was truly a pleasure 

having you for our patient!!! 








Discharge Disposition: HOME SELF-CARE

## 2023-08-13 NOTE — P.PN
Subjective


Progress Note Date: 08/13/23





I am seeing this patient in consultation today 08/10/2023 for acute CHF 

exacerbation.  Patient is a 83-year-old white male with past medical history 

significant for cardiomyopathy, congestive heart failure with a previous known 

ejection fraction of 40-45%, hypertension, hyperlipidemia, chronic kidney dise

ase stage IIIB, hypothyroidism, and diabetes mellitus.  He presented to 

emergency room August 8th complaining of shortness of breath that started 3-4 

days prior.  The shortness of breath has progressively worsened, and is worse on

exertion. He does admit intermittent non-productive cough. He denies any chest 

pain, heart palpitations, lightheadedness, syncope, or lower extremity swelling.

 Denies infectious symptoms such as fever, chills, myalgias, productive cough. 

Chest CT demonstrated small to moderate bilateral pleural effusions and 

pulmonary vascular congestion consistent with congestive heart failure.  There 

was also a 5.8 x 3.5 cm pseuodomass within the fissure and nonspecific mediasti

nal adenopathy.  The mass is a fluid collection within the fissure, and not 

concerning for malignancy.  Repeat echocardiogram shows a reduced left 

ventricular ejection fraction of 40-45%, moderate pulmonary hypertension with 

and RVSP of 50, and mild to moderate tricuspid regurgitation. NT proBNP was 

significantly elevated at 21,400.  The patient has been started on diuretics.  

Patient is currently sitting up in bed, on room air, in no acute distress.  

Patient continues to have some shortness breath on exertion.  Reportedly had 

some unilateral lower extremity edema on arrival, venous Dopplers negative for 

left lower extremity DVT.  Most recent CBC shows some mild anemia with 

hemoglobin of 10.8, hematocrit 33.1, platelets 283, WBC 7.7.  BMP from yesterday

shows a sodium 138, potassium 3.5, chloride 100, serum bicarb 26, BUN 42, 

creatinine 2.03, glucose 146.  Troponins have been mildly elevated at 0.045 and 

are stable.  ECG on arrival shows sinus rhythm with frequent multifocal PVCs.  

Patient does seem hemodynamically stable at this time.





The patient is seen today 08/11/2023 in follow-up on the selective care unit.  

He is currently sitting up in a chair at the bedside.  Awake and alert in no 

acute distress.  He is maintaining good O2 saturations in the 90s on room air.  

He is making good urine.  Chest x-ray reveals evidence of interstitial pulmonary

edema with small effusions.  Some pleural fluid trapped in the left major 

fissure.  White count 8.0.  Hemoglobin 10.6.  Platelets 291.  Sodium 138.  

Potassium 3.8.  Bicarb 23.  BUN 49.  Creatinine 2.16.  He is continued on Lasix 

80 mg daily.














The patient is seen today 08/13/2020 through follow-up on the selective care 

unit.  He sitting up in a chair at the bedside.  Awake and alert in no acute 

distress. He is maintaining good O2 saturations in the mid 90s on room air.  

Afebrile.  Hemodynamically stable.  Blood glucose 163.  His transition to oral 

diuretics.





Objective





- Vital Signs


Vital signs: 


                                   Vital Signs











Temp  97.9 F   08/13/23 08:00


 


Pulse  67   08/13/23 08:00


 


Resp  18   08/13/23 08:00


 


BP  153/78   08/13/23 08:00


 


Pulse Ox  95   08/13/23 08:00


 


FiO2      








                                 Intake & Output











 08/12/23 08/13/23 08/13/23





 18:59 06:59 18:59


 


Intake Total 1448 540 118


 


Output Total 650 900 


 


Balance 798 -360 118


 


Weight 98.7 kg 97.7 kg 


 


Intake:   


 


  Oral 1448 540 118


 


Output:   


 


  Urine 650 900 


 


Other:   


 


  Voiding Method Urinal Urinal Urinal


 


  # Voids 1  














- Exam





GENERAL EXAM: Alert, 83-year-old male, stable and on room air, comfortable in no

apparent distress.


HEAD: Normocephalic and atraumatic


EYES: Normal reaction of pupils, equal size.


NOSE: Clear with pink turbinates.


THROAT: No erythema or exudates.


NECK: No masses, no JVD.


CHEST: No chest wall deformity.


LUNGS: Equal air entry with few crackles in the posterior bases.  No 

conversational dyspnea or accessory muscle use.. 


CVS: S1 and S2 normal with no audible murmur, regular rhythm. No extra heart 

sounds


ABDOMEN: No hepatosplenomegaly, active bowel sounds, no guarding or rigidity. 


SPINE: No scoliosis or deformity


SKIN: No rashes


CENTRAL NERVOUS SYSTEM: No focal deficits, tone is normal in all 4 extremities.


EXTREMITIES: There is no peripheral edema, clubbing, or cyanosis.  Peripheral 

pulses are intact.





- Labs


CBC & Chem 7: 


                                 08/12/23 08:41





                                 08/12/23 08:41


Labs: 


                  Abnormal Lab Results - Last 24 Hours (Table)











  08/12/23 08/12/23 08/13/23 Range/Units





  16:12 19:48 05:51 


 


POC Glucose (mg/dL)  136 H  186 H  163 H  ()  mg/dL














Assessment and Plan


Assessment: 





Acute exacerbation of systolic congestive heart failure. Chest CT demonstrated 

small to moderate bilateral pleural effusions and pulmonary vascular congestion 

consistent with congestive heart failure.  There was also a 5.8 x 3.5 cm 

pseuodomass within the left lung fissure and nonspecific mediastinal adenopathy.

Repeat echocardiogram shows a reduced left ventricular ejection fraction of 40-

45%, moderate pulmonary hypertension with and RVSP of 50, and mild to moderate 

tricuspid regurgitation. NT proBNP was significantly elevated at 21,400.  The pa

tient has been continued on diuretics.





Pseudomass measuring 5.8 x 3.5 cm, appears to be a fluid collection within the 

left major fissure, and not particularly concerning for malignancy.  Follow-up 

chest x-ray shows improvement 





Cardiomyopathy, with a baseline ejection fraction of 40-45%





Elevated troponins, undetermined significance, stable





Anemia of chronic disease





Chronic kidney disease stage IIIB





Diabetes mellitus type 2, non-insulin-dependent





Essential hypertension





Hyperlipidemia





Obesity, with a BMI of 31 kg/m





Hypothyroidism





Remote ex-smoker, quit in 1967





Plan:





The patient was seen and evaluated


Medications reviewed


Currently stable and on room air


Cleared for discharge from the pulmonary standpoint


Continue diuretics


Follow-up in our office in 1 week





I have personally seen and examined the patient, performed the documentation and

the assessment and plan as written.  Number of minutes spent on the visit: 10.

## 2023-08-13 NOTE — P.PN
Subjective





Patient is seen for follow-up for acute kidney injury on top of chronic kidney 

disease.


He is currently being diuresed for volume overload. 


Serum creatinine staying at 2.1-2.0 mg/dL.


Lasix was changed to IV and patient has diuresed well.


Overall feeling slightly better.





Objective





- Vital Signs


Vital signs: 


                                   Vital Signs











Temp  97.9 F   08/13/23 08:00


 


Pulse  67   08/13/23 08:00


 


Resp  18   08/13/23 08:00


 


BP  153/78   08/13/23 08:00


 


Pulse Ox  95   08/13/23 08:00


 


FiO2      








                                 Intake & Output











 08/12/23 08/13/23 08/13/23





 18:59 06:59 18:59


 


Intake Total 1448 540 118


 


Output Total 650 900 


 


Balance 798 -360 118


 


Weight 98.7 kg 97.7 kg 


 


Intake:   


 


  Oral 1448 540 118


 


Output:   


 


  Urine 650 900 


 


Other:   


 


  Voiding Method Urinal Urinal Urinal


 


  # Voids 1  














- Exam





Awake, comfortable, in no acute distress


Examination of the heart S1 and S2


Examination of the lungs bilateral breath sounds are heard


Abdomen is soft nontender


Examination of lower extremities shows no significant edema


CNS exam grossly intact





- Labs


CBC & Chem 7: 


                                 08/12/23 08:41





                                 08/12/23 08:41


Labs: 


                  Abnormal Lab Results - Last 24 Hours (Table)











  08/12/23 08/12/23 08/12/23 Range/Units





  11:37 16:12 19:48 


 


POC Glucose (mg/dL)  249 H  136 H  186 H  ()  mg/dL














  08/13/23 Range/Units





  05:51 


 


POC Glucose (mg/dL)  163 H  ()  mg/dL














Assessment and Plan


Assessment: 





1.  Acute kidney injury secondary to ATN secondary to cardiorenal syndrome.  

Creatinine 1.67 on admission and is staying around 2.1-2.0.  Renal ultrasound 

from September 2022 showed normal-size kidneys without any evidence of 

hydronephrosis.


2.  Chronic kidney disease stage IIIB with baseline creatinine 1.6-1.9 secondary

to cardiorenal syndrome.


3.  Volume overload.  Improving diuresis.


4.  Diabetes mellitus.


5.  Acute on chronic systolic CHF with ejection fraction of 40-45 percent with 

mild to moderate mitral and tricuspid regurgitation and moderate to severe 

pulmonary hypertension.


6.  Hypertension with chronic kidney disease.


7.  Pseudomass in the left upper lobe with fluid filled in the fissure.  Plan is

to maintain diuresis.  No intervention needed per pulmonary


Plan: 








Okay to discharge patient.


Continue with Lasix at 60 mg twice a day post discharge for at least a week and 

then decrease to 40 mg twice a day based on volume status and renal function.


Follow-up as outpatient for CK D.